# Patient Record
Sex: MALE | Race: WHITE | Employment: OTHER | URBAN - METROPOLITAN AREA
[De-identification: names, ages, dates, MRNs, and addresses within clinical notes are randomized per-mention and may not be internally consistent; named-entity substitution may affect disease eponyms.]

---

## 2021-04-06 ENCOUNTER — APPOINTMENT (OUTPATIENT)
Dept: NUCLEAR MEDICINE | Age: 76
End: 2021-04-06
Payer: MEDICARE

## 2021-04-06 ENCOUNTER — APPOINTMENT (OUTPATIENT)
Dept: GENERAL RADIOLOGY | Age: 76
End: 2021-04-06
Payer: MEDICARE

## 2021-04-06 ENCOUNTER — HOSPITAL ENCOUNTER (OUTPATIENT)
Age: 76
Setting detail: OBSERVATION
Discharge: HOME | End: 2021-04-08
Attending: EMERGENCY MEDICINE | Admitting: INTERNAL MEDICINE
Payer: MEDICARE

## 2021-04-06 DIAGNOSIS — R07.9 CHEST PAIN, UNSPECIFIED TYPE: Primary | ICD-10-CM

## 2021-04-06 LAB
A/G RATIO: 1.4 (ref 1.1–2.2)
ALBUMIN SERPL-MCNC: 4.6 G/DL (ref 3.4–5)
ALP BLD-CCNC: 63 U/L (ref 40–129)
ALT SERPL-CCNC: 43 U/L (ref 10–40)
ANION GAP SERPL CALCULATED.3IONS-SCNC: 11 MMOL/L (ref 3–16)
AST SERPL-CCNC: 35 U/L (ref 15–37)
BASOPHILS ABSOLUTE: 0 K/UL (ref 0–0.2)
BASOPHILS RELATIVE PERCENT: 0.4 %
BILIRUB SERPL-MCNC: 0.6 MG/DL (ref 0–1)
BUN BLDV-MCNC: 23 MG/DL (ref 7–20)
CALCIUM SERPL-MCNC: 9.7 MG/DL (ref 8.3–10.6)
CHLORIDE BLD-SCNC: 100 MMOL/L (ref 99–110)
CO2: 24 MMOL/L (ref 21–32)
CREAT SERPL-MCNC: 1 MG/DL (ref 0.8–1.3)
D DIMER: <200 NG/ML DDU (ref 0–229)
EKG ATRIAL RATE: 77 BPM
EKG DIAGNOSIS: NORMAL
EKG P AXIS: 14 DEGREES
EKG P-R INTERVAL: 158 MS
EKG Q-T INTERVAL: 370 MS
EKG QRS DURATION: 102 MS
EKG QTC CALCULATION (BAZETT): 418 MS
EKG R AXIS: 22 DEGREES
EKG T AXIS: 99 DEGREES
EKG VENTRICULAR RATE: 77 BPM
EOSINOPHILS ABSOLUTE: 0.1 K/UL (ref 0–0.6)
EOSINOPHILS RELATIVE PERCENT: 1.3 %
GFR AFRICAN AMERICAN: >60
GFR NON-AFRICAN AMERICAN: >60
GLOBULIN: 3.4 G/DL
GLUCOSE BLD-MCNC: 161 MG/DL (ref 70–99)
HCT VFR BLD CALC: 45.7 % (ref 40.5–52.5)
HEMOGLOBIN: 15.5 G/DL (ref 13.5–17.5)
LV EF: 74 %
LVEF MODALITY: NORMAL
LYMPHOCYTES ABSOLUTE: 1.9 K/UL (ref 1–5.1)
LYMPHOCYTES RELATIVE PERCENT: 28.4 %
MCH RBC QN AUTO: 32 PG (ref 26–34)
MCHC RBC AUTO-ENTMCNC: 34 G/DL (ref 31–36)
MCV RBC AUTO: 94 FL (ref 80–100)
MONOCYTES ABSOLUTE: 0.5 K/UL (ref 0–1.3)
MONOCYTES RELATIVE PERCENT: 8.1 %
NEUTROPHILS ABSOLUTE: 4.1 K/UL (ref 1.7–7.7)
NEUTROPHILS RELATIVE PERCENT: 61.8 %
PDW BLD-RTO: 12.9 % (ref 12.4–15.4)
PLATELET # BLD: 294 K/UL (ref 135–450)
PMV BLD AUTO: 7.3 FL (ref 5–10.5)
POTASSIUM SERPL-SCNC: 4.3 MMOL/L (ref 3.5–5.1)
PRO-BNP: 191 PG/ML (ref 0–449)
RBC # BLD: 4.86 M/UL (ref 4.2–5.9)
SODIUM BLD-SCNC: 135 MMOL/L (ref 136–145)
TOTAL PROTEIN: 8 G/DL (ref 6.4–8.2)
TROPONIN: <0.01 NG/ML
WBC # BLD: 6.7 K/UL (ref 4–11)

## 2021-04-06 PROCEDURE — 71045 X-RAY EXAM CHEST 1 VIEW: CPT

## 2021-04-06 PROCEDURE — 85379 FIBRIN DEGRADATION QUANT: CPT

## 2021-04-06 PROCEDURE — 85025 COMPLETE CBC W/AUTO DIFF WBC: CPT

## 2021-04-06 PROCEDURE — G0378 HOSPITAL OBSERVATION PER HR: HCPCS

## 2021-04-06 PROCEDURE — A9502 TC99M TETROFOSMIN: HCPCS | Performed by: INTERNAL MEDICINE

## 2021-04-06 PROCEDURE — 80053 COMPREHEN METABOLIC PANEL: CPT

## 2021-04-06 PROCEDURE — 36415 COLL VENOUS BLD VENIPUNCTURE: CPT

## 2021-04-06 PROCEDURE — 78452 HT MUSCLE IMAGE SPECT MULT: CPT

## 2021-04-06 PROCEDURE — 93010 ELECTROCARDIOGRAM REPORT: CPT | Performed by: INTERNAL MEDICINE

## 2021-04-06 PROCEDURE — 2580000003 HC RX 258: Performed by: REGISTERED NURSE

## 2021-04-06 PROCEDURE — 83880 ASSAY OF NATRIURETIC PEPTIDE: CPT

## 2021-04-06 PROCEDURE — 93017 CV STRESS TEST TRACING ONLY: CPT

## 2021-04-06 PROCEDURE — 3430000000 HC RX DIAGNOSTIC RADIOPHARMACEUTICAL: Performed by: INTERNAL MEDICINE

## 2021-04-06 PROCEDURE — 84484 ASSAY OF TROPONIN QUANT: CPT

## 2021-04-06 PROCEDURE — 93005 ELECTROCARDIOGRAM TRACING: CPT | Performed by: EMERGENCY MEDICINE

## 2021-04-06 PROCEDURE — 99284 EMERGENCY DEPT VISIT MOD MDM: CPT

## 2021-04-06 PROCEDURE — 6370000000 HC RX 637 (ALT 250 FOR IP): Performed by: REGISTERED NURSE

## 2021-04-06 PROCEDURE — 99204 OFFICE O/P NEW MOD 45 MIN: CPT | Performed by: INTERNAL MEDICINE

## 2021-04-06 PROCEDURE — 6370000000 HC RX 637 (ALT 250 FOR IP): Performed by: EMERGENCY MEDICINE

## 2021-04-06 PROCEDURE — 6360000002 HC RX W HCPCS: Performed by: INTERNAL MEDICINE

## 2021-04-06 RX ORDER — SODIUM CHLORIDE 9 MG/ML
25 INJECTION, SOLUTION INTRAVENOUS PRN
Status: DISCONTINUED | OUTPATIENT
Start: 2021-04-06 | End: 2021-04-08 | Stop reason: HOSPADM

## 2021-04-06 RX ORDER — ASPIRIN 325 MG
325 TABLET ORAL DAILY
Status: DISCONTINUED | OUTPATIENT
Start: 2021-04-07 | End: 2021-04-08 | Stop reason: HOSPADM

## 2021-04-06 RX ORDER — SODIUM CHLORIDE 0.9 % (FLUSH) 0.9 %
10 SYRINGE (ML) INJECTION PRN
Status: DISCONTINUED | OUTPATIENT
Start: 2021-04-06 | End: 2021-04-08 | Stop reason: HOSPADM

## 2021-04-06 RX ORDER — PROMETHAZINE HYDROCHLORIDE 25 MG/1
12.5 TABLET ORAL EVERY 6 HOURS PRN
Status: DISCONTINUED | OUTPATIENT
Start: 2021-04-06 | End: 2021-04-08 | Stop reason: HOSPADM

## 2021-04-06 RX ORDER — ACETAMINOPHEN 325 MG/1
650 TABLET ORAL EVERY 6 HOURS PRN
Status: DISCONTINUED | OUTPATIENT
Start: 2021-04-06 | End: 2021-04-08 | Stop reason: HOSPADM

## 2021-04-06 RX ORDER — SODIUM CHLORIDE 0.9 % (FLUSH) 0.9 %
10 SYRINGE (ML) INJECTION EVERY 12 HOURS SCHEDULED
Status: DISCONTINUED | OUTPATIENT
Start: 2021-04-06 | End: 2021-04-08 | Stop reason: HOSPADM

## 2021-04-06 RX ORDER — ONDANSETRON 2 MG/ML
4 INJECTION INTRAMUSCULAR; INTRAVENOUS EVERY 6 HOURS PRN
Status: DISCONTINUED | OUTPATIENT
Start: 2021-04-06 | End: 2021-04-08 | Stop reason: HOSPADM

## 2021-04-06 RX ORDER — DOXEPIN HYDROCHLORIDE 25 MG/1
75 CAPSULE ORAL NIGHTLY
Status: DISCONTINUED | OUTPATIENT
Start: 2021-04-06 | End: 2021-04-08 | Stop reason: HOSPADM

## 2021-04-06 RX ORDER — FLUTICASONE PROPIONATE 50 MCG
2 SPRAY, SUSPENSION (ML) NASAL DAILY
Status: DISCONTINUED | OUTPATIENT
Start: 2021-04-06 | End: 2021-04-08 | Stop reason: HOSPADM

## 2021-04-06 RX ORDER — PANTOPRAZOLE SODIUM 40 MG/1
40 TABLET, DELAYED RELEASE ORAL
Status: DISCONTINUED | OUTPATIENT
Start: 2021-04-06 | End: 2021-04-08 | Stop reason: HOSPADM

## 2021-04-06 RX ORDER — NITROGLYCERIN 0.4 MG/1
0.4 TABLET SUBLINGUAL EVERY 5 MIN PRN
Status: DISCONTINUED | OUTPATIENT
Start: 2021-04-06 | End: 2021-04-08 | Stop reason: HOSPADM

## 2021-04-06 RX ORDER — LOSARTAN POTASSIUM 25 MG/1
25 TABLET ORAL DAILY
Status: DISCONTINUED | OUTPATIENT
Start: 2021-04-07 | End: 2021-04-07

## 2021-04-06 RX ORDER — ACETAMINOPHEN 650 MG/1
650 SUPPOSITORY RECTAL EVERY 6 HOURS PRN
Status: DISCONTINUED | OUTPATIENT
Start: 2021-04-06 | End: 2021-04-08 | Stop reason: HOSPADM

## 2021-04-06 RX ORDER — ASPIRIN 81 MG/1
324 TABLET, CHEWABLE ORAL ONCE
Status: COMPLETED | OUTPATIENT
Start: 2021-04-06 | End: 2021-04-06

## 2021-04-06 RX ORDER — METOPROLOL SUCCINATE 25 MG/1
12.5 TABLET, EXTENDED RELEASE ORAL 2 TIMES DAILY
Status: DISCONTINUED | OUTPATIENT
Start: 2021-04-06 | End: 2021-04-07

## 2021-04-06 RX ORDER — POLYETHYLENE GLYCOL 3350 17 G/17G
17 POWDER, FOR SOLUTION ORAL DAILY PRN
Status: DISCONTINUED | OUTPATIENT
Start: 2021-04-06 | End: 2021-04-08 | Stop reason: HOSPADM

## 2021-04-06 RX ORDER — CYCLOBENZAPRINE HCL 10 MG
10 TABLET ORAL 3 TIMES DAILY PRN
Status: ON HOLD | COMMUNITY
Start: 2021-04-02 | End: 2021-04-08 | Stop reason: SDUPTHER

## 2021-04-06 RX ORDER — ROSUVASTATIN CALCIUM 10 MG/1
20 TABLET, COATED ORAL DAILY
Status: DISCONTINUED | OUTPATIENT
Start: 2021-04-06 | End: 2021-04-08 | Stop reason: HOSPADM

## 2021-04-06 RX ORDER — CETIRIZINE HYDROCHLORIDE 10 MG/1
10 TABLET ORAL DAILY
Status: DISCONTINUED | OUTPATIENT
Start: 2021-04-06 | End: 2021-04-08 | Stop reason: HOSPADM

## 2021-04-06 RX ADMIN — ASPIRIN 324 MG: 81 TABLET, CHEWABLE ORAL at 09:14

## 2021-04-06 RX ADMIN — DOXEPIN HYDROCHLORIDE 75 MG: 25 CAPSULE ORAL at 20:55

## 2021-04-06 RX ADMIN — TETROFOSMIN 31.1 MILLICURIE: 1.38 INJECTION, POWDER, LYOPHILIZED, FOR SOLUTION INTRAVENOUS at 13:02

## 2021-04-06 RX ADMIN — NITROGLYCERIN 0.4 MG: 0.4 TABLET, ORALLY DISINTEGRATING SUBLINGUAL at 09:14

## 2021-04-06 RX ADMIN — TETROFOSMIN 10.8 MILLICURIE: 1.38 INJECTION, POWDER, LYOPHILIZED, FOR SOLUTION INTRAVENOUS at 11:50

## 2021-04-06 RX ADMIN — REGADENOSON 0.4 MG: 0.08 INJECTION, SOLUTION INTRAVENOUS at 13:01

## 2021-04-06 RX ADMIN — METOPROLOL SUCCINATE 12.5 MG: 25 TABLET, EXTENDED RELEASE ORAL at 20:55

## 2021-04-06 RX ADMIN — Medication 10 ML: at 21:00

## 2021-04-06 ASSESSMENT — PAIN DESCRIPTION - LOCATION: LOCATION: CHEST

## 2021-04-06 NOTE — CONSULTS
Hypertension, and Panic anxiety syndrome. Surgical History:   has a past surgical history that includes Hernia repair; Cholecystectomy; Appendectomy; Colonoscopy (12/16/10); and Colonoscopy (2/24/2015). Social History:   reports that he quit smoking about 35 years ago. He has never used smokeless tobacco. He reports that he does not drink alcohol. Family History:  No evidence for sudden cardiac death or premature CAD    Medications:  Reviewed and are listed in nursing record. and/or listed below  Outpatient Medications:  Prior to Admission medications    Medication Sig Start Date End Date Taking? Authorizing Provider   cyclobenzaprine (FLEXERIL) 10 MG tablet Take 10 mg by mouth 3 times daily as needed 4/2/21  Yes Historical Provider, MD   rosuvastatin (CRESTOR) 10 MG tablet Take 20 mg by mouth daily. Yes Historical Provider, MD   aspirin 325 MG tablet Take 325 mg by mouth daily. Yes Historical Provider, MD   losartan (COZAAR) 25 MG tablet Take 25 mg by mouth daily. Yes Historical Provider, MD   metoprolol (TOPROL-XL) 25 MG XL tablet Take 12.5 mg by mouth 2 times daily. 12/16/10  Yes Historical Provider, MD   doxepin (SINEQUAN) 100 MG capsule Take 75 mg by mouth nightly. Yes Historical Provider, MD   loratadine (CLARITIN) 10 MG tablet Take 10 mg by mouth daily. Yes Historical Provider, MD   fluticasone (FLONASE) 50 MCG/ACT nasal spray 2 sprays by Nasal route daily.    Yes Historical Provider, MD       In-patient schedule medications:   [START ON 4/7/2021] aspirin  325 mg Oral Daily    doxepin  75 mg Oral Nightly    fluticasone  2 spray Nasal Daily    cetirizine  10 mg Oral Daily    [START ON 4/7/2021] losartan  25 mg Oral Daily    metoprolol succinate  12.5 mg Oral BID    rosuvastatin  20 mg Oral Daily    sodium chloride flush  10 mL Intravenous 2 times per day    [START ON 4/7/2021] enoxaparin  40 mg Subcutaneous Daily         Infusion Medications:   sodium chloride Allergies:  Atorvastatin and Lisinopril     Review of Systems:   All 14 point review of symptoms completed. Pertinent positives identified in the HPI, all other review of symptoms findings as below.      Review of Systems - History obtained from the patient  General ROS: negative for - chills, fever or night sweats  Psychological ROS: negative for - disorientation or hallucinations  Ophthalmic ROS: negative for - dry eyes, eye pain or loss of vision  ENT ROS: negative for - nasal discharge or sore throat  Allergy and Immunology ROS: negative for - hives or itchy/watery eyes  Hematological and Lymphatic ROS: negative for - jaundice or night sweats  Endocrine ROS: negative for - mood swings or temperature intolerance  Breast ROS: deferred  Respiratory ROS: negative for - hemoptysis or stridor  Gastrointestinal ROS: no abdominal pain, change in bowel habits, or black or bloody stools  Genito-Urinary ROS: no dysuria, trouble voiding, or hematuria  Musculoskeletal ROS: negative for - gait disturbance, joint pain or joint stiffness  Neurological ROS: negative for - seizures or speech problems  Dermatological ROS: negative for - rash or skin lesion changes      Physical Examination:    [unfilled]  /70   Pulse 67   Temp 97.6 °F (36.4 °C) (Oral)   Resp 18   Ht 5' 6\" (1.676 m)   Wt 220 lb (99.8 kg)   SpO2 95%   BMI 35.51 kg/m²    Weight: 220 lb (99.8 kg)     Wt Readings from Last 3 Encounters:   04/06/21 220 lb (99.8 kg)   02/24/15 220 lb (99.8 kg)   11/12/13 220 lb (99.8 kg)     No intake or output data in the 24 hours ending 04/06/21 1139    General Appearance:  Alert, cooperative, no distress, appears stated age   Head:  Normocephalic, without obvious abnormality, atraumatic   Eyes:  PERRL, conjunctiva/corneas clear       Nose: Nares normal, no drainage or sinus tenderness   Throat: Lips, mucosa, and tongue normal   Neck: Supple, symmetrical, trachea midline, no adenopathy, thyroid: not enlarged, symmetric, no tenderness/mass/nodules, no carotid bruit or JVD       Lungs:   Clear to auscultation bilaterally, respirations unlabored   Chest Wall:  No tenderness or deformity   Heart:  Regular rhythm and normal rate; S1, S2 are normal; no murmur noted; no rub or gallop   Abdomen:   Soft, non-tender, bowel sounds active all four quadrants,  no masses, no organomegaly           Extremities: Extremities normal, atraumatic, no cyanosis or edema   Pulses: 2+ and symmetric   Skin: Skin color, texture, turgor normal, no rashes or lesions   Pysch: Normal mood and affect   Neurologic: Normal gross motor and sensory exam.         Labs  Recent Labs     04/06/21  0807   WBC 6.7   HGB 15.5   HCT 45.7   MCV 94.0        Recent Labs     04/06/21  0807   CREATININE 1.0   BUN 23*   *   K 4.3      CO2 24     No results for input(s): INR, PROTIME in the last 72 hours. Recent Labs     04/06/21  0807   TROPONINI <0.01     Invalid input(s): PRO-BNP  No results for input(s): CHOL, HDL in the last 72 hours. Invalid input(s): LDL, TG      Imaging:  I have reviewed the below testing personally and my interpretation is below. EKG:  Normal sinus rhythmInferior infarct , age undeterminedAbnormal ECGNo previous ECGs available  CXR:      Assessment:  66 y.o. patient with:  Problem List Items Addressed This Visit     Chest pain - Primary      Active Problems:    Chest pain    CAD (coronary artery disease)    Abnormal EKG    Hypertension  Resolved Problems:    * No resolved hospital problems. *      Plan:  1. The patient's symptoms and associated risk factors suggest an intermediate probability of ischemic heart disease as the cause for the symptoms. I recommend the patient undergo non-invasive evaluation with stress testing to further evaluate the symptoms. 2. Serial troponin levels will be ordered and reviewed  3.  The patient has been given instructions on addressing diet, regular exercise, weight control, smoking abstention, medication compliance, and stress minimization. 4. The patient should be treated with these therapies unless contraindicated:   ~asa daily   ~beta-blockers   ~statin therapy   ~ACE/ARB   ~repeat troponin until down trending   ~EKG as clinically needed  5. After review of these tests, further recommendations regarding care will be given. All questions and concerns were addressed to the patient/family. Alternatives to my treatment were discussed. The note was completed using EMR. Every effort was made to ensure accuracy; however, inadvertent computerized transcription errors may be present.     Juan Alvarado MD, [address], [place], [place]  [phone] [facility]  [phone] Main central  4/6/2021  11:39 AM

## 2021-04-06 NOTE — H&P
Pupils equal, round, and reactive to light. Extra ocular muscles intact. Conjunctivae/corneas clear. Neck: Supple, with full range of motion. No jugular venous distention. Trachea midline. Respiratory:  Normal respiratory effort. Clear to auscultation, bilaterally without Rales/Wheezes/Rhonchi. Cardiovascular:  Regular rate and rhythm with normal S1/S2 without murmurs, rubs or gallops. Abdomen: Soft, non-tender, non-distended with normal bowel sounds. Musculoskeletal:  No clubbing, cyanosis or edema bilaterally. Full range of motion without deformity. Skin: Skin color, texture, turgor normal.  No rashes or lesions. Neurologic:  Neurovascularly intact without any focal sensory/motor deficits. Cranial nerves: II-XII intact, grossly non-focal.  Psychiatric:  Alert and oriented, thought content appropriate, normal insight  Capillary Refill: Brisk,< 3 seconds   Peripheral Pulses: +2 palpable, equal bilaterally       Labs:     Recent Labs     04/06/21  0807   WBC 6.7   HGB 15.5   HCT 45.7        Recent Labs     04/06/21  0807   *   K 4.3      CO2 24   BUN 23*   CREATININE 1.0   CALCIUM 9.7     Recent Labs     04/06/21  0807   AST 35   ALT 43*   BILITOT 0.6   ALKPHOS 63     Recent Labs     04/06/21  0807   TROPONINI <0.01     Radiology:     XR CHEST PORTABLE   Final Result   Mild cardiomegaly. Probable basilar atelectasis. Low lung volumes. NM Cardiac Stress Test Nuclear Imaging    (Results Pending)       ASSESSMENT:    Active Hospital Problems    Diagnosis Date Noted    Chest pain [R07.9] 04/06/2021    Hypertension [I10]     CAD (coronary artery disease) [I25.10] 2008    Abnormal EKG [R94.31] 2008       Chest pain in pt with history of CAD status post stents:  - Etiology unclear. Suspect MSK vs GI related. Pain developed after he moved himself in bed a few weeks ago, pain is not reproducible though. Pt believes that symptoms might have started then.  Pain typically occurs the same time every night after he lies down. He reports intermittent episodes of melena as well. - Given history of CAD and stents Cardiology was consulted for recommendations. Stress test was obtained and was normal. EKG non-ischemic in the ED. Serial troponin trend was negative x3. ACS ruled out. D-dimer was negative. - Consult GI for further assistance. Start empiric PPI. - Continue aspirin, statin, metoprolol and losartan. - Monitor pt on telemetry. Hypertension:  - Variable control. Continue his home medications. Monitor. Hyperlipidemia:  - Continue statin. PTSD/anxiety:  - Mood stable. Continue his home medications. Obesity:  - With Body mass index is 35.51 kg/m². Complicating assessment and treatment. Placing patient at risk for multiple co-morbidities as well as early death and contributing to the patient's presentation. Counseled on weight loss. DVT Prophylaxis: Lovenox   Diet: Diet NPO Effective Now  Diet NPO, After Midnight  Code Status: Full Code    PT/OT Eval Status: Not indicated     Dispo - < 2 days        [address], APRN - CNP    Thank you Sheeba Hamm MD for the opportunity to be involved in this patient's care. If you have any questions or concerns please feel free to contact me at [phone].

## 2021-04-06 NOTE — PROGRESS NOTES
A Rosemary Myoview stress test was completed on this patient as ordered. The patient tolerated the procedure well. Awaiting stress imaging at this time.

## 2021-04-06 NOTE — ED PROVIDER NOTES
METOPROLOL (TOPROL-XL) 25 MG XL TABLET    Take 12.5 mg by mouth 2 times daily. ROSUVASTATIN (CRESTOR) 10 MG TABLET    Take 20 mg by mouth daily. Social history:  reports that he quit smoking about 35 years ago. He does not have any smokeless tobacco history on file. He reports that he does not drink alcohol. Family history:    Family History   Problem Relation Age of Onset    Heart Disease Father          Exam  ED Triage Vitals   BP Temp Temp src Pulse Resp SpO2 Height Weight   -- -- -- -- -- -- -- --     Nursing note and vitals reviewed. Constitutional: In no acute distress  HENT:      Head: Normocephalic      Ears: External ears normal.      Nose: Nose normal.     Mouth: Membrane mucosa moist   Eyes: No discharge. Neck: Supple. Trachea midline. Cardiovascular: Regular rate. Warm extremities  Pulmonary/Chest: Effort normal. No respiratory distress. Abdominal: Soft. No distension. Nontender  : Deferred  Rectal: Deferred   Musculoskeletal: Moves all extremities. No gross deformity. Neurological: Alert and oriented. Face symmetric. Speech is clear. Skin: Warm and dry. Psychiatric: Normal mood and affect. Behavior is normal.    Procedures      EKG  The Ekg interpreted by me in the absence of a cardiologist shows. Normal sinus rhythm. No specific ST changes appreciated.   HR 77  No prior EKG for comparison  Q waves in lead III, aVF  Inverted T wave lead I, aVL    Radiology  No orders to display       Labs  Results for orders placed or performed during the hospital encounter of 04/06/21   EKG 12 Lead   Result Value Ref Range    Ventricular Rate 77 BPM    Atrial Rate 77 BPM    P-R Interval 158 ms    QRS Duration 102 ms    Q-T Interval 370 ms    QTc Calculation (Bazett) 418 ms    P Axis 14 degrees    R Axis 22 degrees    T Axis 99 degrees    Diagnosis       Normal sinus rhythmInferior infarct , age undeterminedAbnormal ECGNo previous ECGs available       Screenings           MDM and ED Course  This is a 66 y.o. male who presents to the ED for patient is a 47-year-old man with history of coronary artery disease status post stenting in 2008 who presents with chest pain for 3 weeks that is intermittent and worsening. May be due to ACS. EKG shows inverted T waves in lateral leads with no old for comparison. Will give aspirin and nitroglycerin. Pulmonary embolism on differential.  Will obtain D-dimer to help risk stratify for this as well as for dissection. Not having infectious symptoms    Pain improved since waiting here without treatment. Lab unremarkable. Will admit to hospitalist service for chest pain workup    [unfilled]    Final Impression  1. Chest pain, unspecified type        Blood pressure (!) 148/82, pulse 76, temperature 98.7 °F (37.1 °C), temperature source Oral, resp. rate 17, height 5' 6\" (1.676 m), weight 220 lb (99.8 kg), SpO2 97 %. Disposition:  DISPOSITION Decision To Admit 04/06/2021 08:10:50 AM      Patient Referrals:  No follow-up provider specified. Discharge Medications:  New Prescriptions    No medications on file       Discontinued Medications:  Discontinued Medications    No medications on file       This chart was generated using the [address] [place] [address] dictation system. I created this record but it may contain dictation errors given the limitations of this technology.         Theresa Lowery MD  04/06/21 2131

## 2021-04-07 ENCOUNTER — APPOINTMENT (OUTPATIENT)
Dept: CT IMAGING | Age: 76
End: 2021-04-07
Payer: MEDICARE

## 2021-04-07 LAB
A/G RATIO: 1.5 (ref 1.1–2.2)
ALBUMIN SERPL-MCNC: 4.5 G/DL (ref 3.4–5)
ALP BLD-CCNC: 61 U/L (ref 40–129)
ALT SERPL-CCNC: 44 U/L (ref 10–40)
ANION GAP SERPL CALCULATED.3IONS-SCNC: 10 MMOL/L (ref 3–16)
AST SERPL-CCNC: 38 U/L (ref 15–37)
BILIRUB SERPL-MCNC: 0.6 MG/DL (ref 0–1)
BUN BLDV-MCNC: 24 MG/DL (ref 7–20)
CALCIUM SERPL-MCNC: 9.5 MG/DL (ref 8.3–10.6)
CHLORIDE BLD-SCNC: 101 MMOL/L (ref 99–110)
CHOLESTEROL, TOTAL: 174 MG/DL (ref 0–199)
CO2: 27 MMOL/L (ref 21–32)
CREAT SERPL-MCNC: 1 MG/DL (ref 0.8–1.3)
EKG ATRIAL RATE: 70 BPM
EKG DIAGNOSIS: NORMAL
EKG P AXIS: 46 DEGREES
EKG P-R INTERVAL: 168 MS
EKG Q-T INTERVAL: 410 MS
EKG QRS DURATION: 106 MS
EKG QTC CALCULATION (BAZETT): 442 MS
EKG R AXIS: 58 DEGREES
EKG T AXIS: 116 DEGREES
EKG VENTRICULAR RATE: 70 BPM
GFR AFRICAN AMERICAN: >60
GFR NON-AFRICAN AMERICAN: >60
GLOBULIN: 3.1 G/DL
GLUCOSE BLD-MCNC: 136 MG/DL (ref 70–99)
HCT VFR BLD CALC: 46.2 % (ref 40.5–52.5)
HDLC SERPL-MCNC: 38 MG/DL (ref 40–60)
HEMOGLOBIN: 15.5 G/DL (ref 13.5–17.5)
LDL CHOLESTEROL CALCULATED: 108 MG/DL
MCH RBC QN AUTO: 31.8 PG (ref 26–34)
MCHC RBC AUTO-ENTMCNC: 33.5 G/DL (ref 31–36)
MCV RBC AUTO: 95.1 FL (ref 80–100)
PDW BLD-RTO: 12.9 % (ref 12.4–15.4)
PLATELET # BLD: 283 K/UL (ref 135–450)
PMV BLD AUTO: 7.1 FL (ref 5–10.5)
POTASSIUM REFLEX MAGNESIUM: 4.5 MMOL/L (ref 3.5–5.1)
RBC # BLD: 4.86 M/UL (ref 4.2–5.9)
SARS-COV-2, NAAT: NOT DETECTED
SODIUM BLD-SCNC: 138 MMOL/L (ref 136–145)
TOTAL PROTEIN: 7.6 G/DL (ref 6.4–8.2)
TRIGL SERPL-MCNC: 140 MG/DL (ref 0–150)
VLDLC SERPL CALC-MCNC: 28 MG/DL
WBC # BLD: 6.7 K/UL (ref 4–11)

## 2021-04-07 PROCEDURE — 6370000000 HC RX 637 (ALT 250 FOR IP): Performed by: INTERNAL MEDICINE

## 2021-04-07 PROCEDURE — 85027 COMPLETE CBC AUTOMATED: CPT

## 2021-04-07 PROCEDURE — 36415 COLL VENOUS BLD VENIPUNCTURE: CPT

## 2021-04-07 PROCEDURE — 6370000000 HC RX 637 (ALT 250 FOR IP): Performed by: REGISTERED NURSE

## 2021-04-07 PROCEDURE — 93010 ELECTROCARDIOGRAM REPORT: CPT | Performed by: INTERNAL MEDICINE

## 2021-04-07 PROCEDURE — 93005 ELECTROCARDIOGRAM TRACING: CPT | Performed by: REGISTERED NURSE

## 2021-04-07 PROCEDURE — 71250 CT THORAX DX C-: CPT

## 2021-04-07 PROCEDURE — G0378 HOSPITAL OBSERVATION PER HR: HCPCS

## 2021-04-07 PROCEDURE — 80053 COMPREHEN METABOLIC PANEL: CPT

## 2021-04-07 PROCEDURE — 99226 PR SBSQ OBSERVATION CARE/DAY 35 MINUTES: CPT | Performed by: INTERNAL MEDICINE

## 2021-04-07 PROCEDURE — 87635 SARS-COV-2 COVID-19 AMP PRB: CPT

## 2021-04-07 PROCEDURE — 2580000003 HC RX 258: Performed by: REGISTERED NURSE

## 2021-04-07 PROCEDURE — 80061 LIPID PANEL: CPT

## 2021-04-07 RX ORDER — METOPROLOL SUCCINATE 25 MG/1
25 TABLET, EXTENDED RELEASE ORAL 2 TIMES DAILY
Status: DISCONTINUED | OUTPATIENT
Start: 2021-04-07 | End: 2021-04-08 | Stop reason: HOSPADM

## 2021-04-07 RX ORDER — LOSARTAN POTASSIUM 25 MG/1
50 TABLET ORAL DAILY
Status: DISCONTINUED | OUTPATIENT
Start: 2021-04-08 | End: 2021-04-08 | Stop reason: HOSPADM

## 2021-04-07 RX ADMIN — DOXEPIN HYDROCHLORIDE 75 MG: 25 CAPSULE ORAL at 22:00

## 2021-04-07 RX ADMIN — METOPROLOL SUCCINATE 25 MG: 25 TABLET, EXTENDED RELEASE ORAL at 22:00

## 2021-04-07 RX ADMIN — CETIRIZINE HYDROCHLORIDE 10 MG: 10 TABLET, FILM COATED ORAL at 08:48

## 2021-04-07 RX ADMIN — Medication 10 ML: at 08:46

## 2021-04-07 RX ADMIN — METOPROLOL SUCCINATE 12.5 MG: 25 TABLET, EXTENDED RELEASE ORAL at 08:49

## 2021-04-07 RX ADMIN — Medication 10 ML: at 22:00

## 2021-04-07 RX ADMIN — PANTOPRAZOLE SODIUM 40 MG: 40 TABLET, DELAYED RELEASE ORAL at 06:38

## 2021-04-07 RX ADMIN — ROSUVASTATIN CALCIUM 20 MG: 10 TABLET, FILM COATED ORAL at 08:47

## 2021-04-07 RX ADMIN — LOSARTAN POTASSIUM 25 MG: 25 TABLET, FILM COATED ORAL at 08:48

## 2021-04-07 ASSESSMENT — PAIN DESCRIPTION - PAIN TYPE: TYPE: ACUTE PAIN

## 2021-04-07 ASSESSMENT — PAIN DESCRIPTION - LOCATION: LOCATION: CHEST

## 2021-04-07 NOTE — PLAN OF CARE
Problem: Falls - Risk of:  Goal: Will remain free from falls  Description: Will remain free from falls  4/7/2021 0240 by Damien Wilkinson RN  Outcome: Ongoing     Problem: Pain:  Goal: Pain level will decrease  Description: Pain level will decrease  Outcome: Ongoing

## 2021-04-07 NOTE — PROGRESS NOTES
[address]   Progress Note  Cardiology    CC: chest pain     HPI: no cp/sob    Past Medical History   has a past medical history of Arthritis, CAD (coronary artery disease), Hyperlipidemia, Hypertension, and Panic anxiety syndrome. Past Surgical History   has a past surgical history that includes Hernia repair; Cholecystectomy; Appendectomy; Colonoscopy (12/16/10); and Colonoscopy (2/24/2015). Social History   reports that he quit smoking about 35 years ago. He has never used smokeless tobacco. He reports that he does not drink alcohol. Family History  family history includes Heart Disease in his father. Medications  Prior to Admission medications    Medication Sig Start Date End Date Taking? Authorizing Provider   cyclobenzaprine (FLEXERIL) 10 MG tablet Take 10 mg by mouth 3 times daily as needed 4/2/21  Yes Historical Provider, MD   rosuvastatin (CRESTOR) 10 MG tablet Take 20 mg by mouth daily. Yes Historical Provider, MD   aspirin 325 MG tablet Take 325 mg by mouth daily. Yes Historical Provider, MD   losartan (COZAAR) 25 MG tablet Take 25 mg by mouth daily. Yes Historical Provider, MD   metoprolol (TOPROL-XL) 25 MG XL tablet Take 12.5 mg by mouth 2 times daily. 12/16/10  Yes Historical Provider, MD   doxepin (SINEQUAN) 100 MG capsule Take 75 mg by mouth nightly. Yes Historical Provider, MD   loratadine (CLARITIN) 10 MG tablet Take 10 mg by mouth daily. Yes Historical Provider, MD   fluticasone (FLONASE) 50 MCG/ACT nasal spray 2 sprays by Nasal route daily. Yes Historical Provider, MD       Allergies  Atorvastatin and Lisinopril    Review of Systems:   Reviewed.  No changes except as noted in HPI and A/P      Lab Results   Component Value Date    WBC 6.7 04/07/2021    HGB 15.5 04/07/2021    HCT 46.2 04/07/2021    MCV 95.1 04/07/2021     04/07/2021     Lab Results   Component Value Date    CREATININE 1.0 04/07/2021    BUN 24 (H) 04/07/2021     04/07/2021    K 4.5 04/07/2021     04/07/2021    CO2 27 04/07/2021     Lab Results   Component Value Date    INR 0.96 08/27/2012    PROTIME 11.0 08/27/2012       I reviewed EKGs and radiology imaging. Pertinent findings and changes as described in assessment below. Physical Examination:    /80   Pulse 73   Temp 97.7 °F (36.5 °C) (Oral)   Resp 16   Ht 5' 6\" (1.676 m)   Wt 217 lb 1.6 oz (98.5 kg)   SpO2 93%   BMI 35.04 kg/m²      General Appearance:  Alert, no distress, appears stated age   Head:  Normocephalic, without obvious abnormality, atraumatic   Eyes:  PERRL, conjunctiva/corneas clear         Nose: Nares normal, no drainage or sinus tenderness   Throat: Lips, mucosa, and tongue normal   Neck: Supple, symmetrical, trachea midline, no adenopathy         Lungs:   Clear to auscultation bilaterally    Chest Wall:  No tenderness or deformity   Heart:  Regular rate and rhythm, S1, S2 normal, no murmur, rub or gallop   Abdomen:   Soft, non tender, normal bowel sounds                Extremities: No cyanosis or edema   Pulses: 2+ and symmetric   Skin: Skin color, texture, turgor normal, no rashes    Pysch: Normal mood and affect   Neurologic: Normal gross motor and sensory exam.        Assessment:    Chest pain - appears non cardiac  CAD (coronary artery disease) - stable.  Stress test reviewed   Abnormal EKG  Hypertension - suboptimal  HLD - stable    Plan  Increase losartan  Increase toprol   Cont ASA, statin             Mera Stevens MD, 4/7/2021 12:21 PM

## 2021-04-07 NOTE — CONSULTS
Gastroenterology Consult Note    Patient:   Sam Livingston   YOB: 1945   Facility:   [facility]   Referring/PCP: Gamaliel Shelby MD  Date:     4/7/2021  Consultant:   Jose Henderson MD    Subjective: This 66 y.o. male was admitted 4/6/2021 with a diagnosis of \"Chest pain [R07.9]\" and is seen in consultation regarding \"CP\". Information was obtained from interview of  the patient, examination of the patient, and review of records. I did  update the past medical, surgical, social and / or family history. CP moderate for 3 weeks anterior assoc w breathing    Current status  Present  Diet Order: DIET CARDIAC; and he is tolerating diet. Recently, he has experienced moderate, maximum 5/10 cheat Pain and he has not required Intravenous narcotic analgesics. The patient has also experienced no constipation, diarrhea, fever, hematochezia, melena, nausea and vomiting      Prior to Admission medications    Medication Sig Start Date End Date Taking? Authorizing Provider   cyclobenzaprine (FLEXERIL) 10 MG tablet Take 10 mg by mouth 3 times daily as needed 4/2/21  Yes Historical Provider, MD   rosuvastatin (CRESTOR) 10 MG tablet Take 20 mg by mouth daily. Yes Historical Provider, MD   aspirin 325 MG tablet Take 325 mg by mouth daily. Yes Historical Provider, MD   losartan (COZAAR) 25 MG tablet Take 25 mg by mouth daily. Yes Historical Provider, MD   metoprolol (TOPROL-XL) 25 MG XL tablet Take 12.5 mg by mouth 2 times daily. 12/16/10  Yes Historical Provider, MD   doxepin (SINEQUAN) 100 MG capsule Take 75 mg by mouth nightly. Yes Historical Provider, MD   loratadine (CLARITIN) 10 MG tablet Take 10 mg by mouth daily. Yes Historical Provider, MD   fluticasone (FLONASE) 50 MCG/ACT nasal spray 2 sprays by Nasal route daily.    Yes Historical Provider, MD      Scheduled Medications:    aspirin  325 mg Oral Daily    doxepin  75 mg Oral Nightly    fluticasone  2 spray Nasal Daily    cetirizine  10 mg Oral Daily    losartan  25 mg Oral Daily    metoprolol succinate  12.5 mg Oral BID    rosuvastatin  20 mg Oral Daily    sodium chloride flush  10 mL Intravenous 2 times per day    enoxaparin  40 mg Subcutaneous Daily    pantoprazole  40 mg Oral QAM AC     Infusions:    sodium chloride       PRN Medications: nitroGLYCERIN, sodium chloride flush, sodium chloride, promethazine **OR** ondansetron, acetaminophen **OR** acetaminophen, polyethylene glycol  Allergies: Allergies   Allergen Reactions    Atorvastatin      nausea    Lisinopril Other (See Comments)     cough       Past Medical History:   Diagnosis Date    Arthritis     joints    CAD (coronary artery disease) 2008    stents x2    Hyperlipidemia     Hypertension     Panic anxiety syndrome      Past Surgical History:   Procedure Laterality Date    APPENDECTOMY      CHOLECYSTECTOMY      COLONOSCOPY  12/16/10    COLONOSCOPY  2015    Poylps    HERNIA REPAIR      bilateral inguinal       Social:   Social History     Tobacco Use    Smoking status: Former Smoker     Quit date: 1985     Years since quittin.3    Smokeless tobacco: Never Used   Substance Use Topics    Alcohol use: No     Family:   Family History   Problem Relation Age of Onset    Heart Disease Father        ROS: Pertinent items are noted in HPI.     Objective:   Vital Signs:  Temp (24hrs), Av.9 °F (36.6 °C), Min:97.6 °F (36.4 °C), Max:98.7 °F (98.9 °C)     Systolic (15TFN), BBN:013 , Min:116 , QBR:362      Diastolic (34ITA), JUI:40, Min:70, Max:84     Pulse  Av.3  Min: 62  Max: 77  BP (!) 145/84   Pulse 71   Temp 97.8 °F (36.6 °C) (Oral)   Resp 17   Ht 5' 6\" (1.676 m)   Wt 217 lb 1.6 oz (98.5 kg)   SpO2 93%   BMI 35.04 kg/m²      Physical Exam:   BP (!) 145/84   Pulse 71   Temp 97.8 °F (36.6 °C) (Oral)   Resp 17   Ht 5' 6\" (1.676 m)   Wt 217 lb 1.6 oz (98.5 kg)   SpO2 93%   BMI 35.04 kg/m²   General appearance: alert, appears stated age and cooperative  Lungs: clear to auscultation bilaterally  Chest wall: no tenderness  Heart: regular rate and rhythm, S1, S2 normal, no murmur, click, rub or gallop  Abdomen: soft, non-tender; bowel sounds normal; no masses,  no organomegaly  Extremities: extremities normal, atraumatic, no cyanosis or edema  Skin: Skin color, texture, turgor normal. No rashes or lesions  Neurologic: Grossly normal    Lab and Imaging Review   Recent Labs     04/06/21  0807   WBC 6.7   HGB 15.5   MCV 94.0      *   K 4.3      CO2 24   BUN 23*   CREATININE 1.0   GLUCOSE 161*   CALCIUM 9.7   PROT 8.0   LABALBU 4.6   AST 35   ALT 43*   ALKPHOS 63   BILITOT 0.6       Assessment:     Patient Active Problem List    Diagnosis Date Noted    Chest pain 04/06/2021    Hypertension     CAD (coronary artery disease) 2008    Abnormal EKG 2008     75 y.o. male with PMH of GERD, CAD s/p stents, HLD, HTN and anxiety admitted w Lt anterior CP since he strained to get out of bed 3 weeks ago. The pain is worse w breathing and when lays down, but not w activity or eating. Had neg. CXR and Stress test. This sounds musculoskeletal or pleuritic but other etiology needs to be ruled out. Plan:   1. Agree w Protonix  2. Consider chest CT-A  3. Will schedule EGD in am if above neg.   4. Will follow    Klever Peace MD       [phone]) [phone]

## 2021-04-07 NOTE — PROGRESS NOTES
chloride flush  10 mL Intravenous 2 times per day    enoxaparin  40 mg Subcutaneous Daily    pantoprazole  40 mg Oral QAM AC     PRN Meds: nitroGLYCERIN, sodium chloride flush, sodium chloride, promethazine **OR** ondansetron, acetaminophen **OR** acetaminophen, polyethylene glycol      Intake/Output Summary (Last 24 hours) at 4/7/2021 1314  Last data filed at 4/7/2021 0007  Gross per 24 hour   Intake 240 ml   Output 0 ml   Net 240 ml       Physical Exam Performed:    /80   Pulse 73   Temp 97.7 °F (36.5 °C) (Oral)   Resp 16   Ht 5' 6\" (1.676 m)   Wt 217 lb 1.6 oz (98.5 kg)   SpO2 93%   BMI 35.04 kg/m²     General appearance: No apparent distress, appears stated age and cooperative. HEENT: Pupils equal, round, and reactive to light. Conjunctivae/corneas clear. Neck: Supple, with full range of motion. No jugular venous distention. Trachea midline. Respiratory:  Normal respiratory effort. Clear to auscultation, bilaterally without Rales/Wheezes/Rhonchi. Cardiovascular: Regular rate and rhythm with normal S1/S2 without murmurs, rubs or gallops. Abdomen: Soft, non-tender, non-distended with normal bowel sounds. Musculoskeletal: No clubbing, cyanosis or edema bilaterally. Full range of motion without deformity. Skin: Skin color, texture, turgor normal.  No rashes or lesions. Neurologic:  Neurovascularly intact without any focal sensory/motor deficits.  Cranial nerves: II-XII intact, grossly non-focal.  Psychiatric: Alert and oriented, thought content appropriate, normal insight  Capillary Refill: Brisk,< 3 seconds   Peripheral Pulses: +2 palpable, equal bilaterally       Labs:   Recent Labs     04/06/21  0807 04/07/21  0811   WBC 6.7 6.7   HGB 15.5 15.5   HCT 45.7 46.2    283     Recent Labs     04/06/21  0807 04/07/21  0811   * 138   K 4.3 4.5    101   CO2 24 27   BUN 23* 24*   CREATININE 1.0 1.0   CALCIUM 9.7 9.5     Recent Labs     04/06/21  0807 04/07/21  0811   AST 35 38* ALT 43* 44*   BILITOT 0.6 0.6   ALKPHOS 63 61     No results for input(s): INR in the last 72 hours. Recent Labs     04/06/21  0807 04/06/21  1510 04/06/21  1914   TROPONINI <0.01 <0.01 <0.01       Urinalysis:    No results found for: Mikaela Solum, BACTERIA, RBCUA, BLOODU, SPECGRAV, GLUCOSEU    Radiology:  CT CHEST WO CONTRAST   Final Result   No acute abnormality. NM Cardiac Stress Test Nuclear Imaging   Final Result      XR CHEST PORTABLE   Final Result   Mild cardiomegaly. Probable basilar atelectasis. Low lung volumes. Assessment/Plan:    Active Hospital Problems    Diagnosis    Chest pain [R07.9]    Hypertension [I10]    CAD (coronary artery disease) [I25.10]    Abnormal EKG [R94.31]       Chest pain in pt with history of CAD status post stents:  - Etiology unclear. Suspect MSK vs GI related. Pain developed after he moved himself in bed a few weeks ago, pain is not reproducible though. Pt believes that symptoms might have started then. Pain typically occurs the same time every night after he lies down. He reports intermittent episodes of melena as well. - Given history of CAD and stents Cardiology was consulted for recommendations. Stress test was obtained and was normal. EKG non-ischemic in the ED. Serial troponin trend was negative x3. ACS ruled out.   - CT chest showed no acute abnormality but did show non-specific thickening a the GE junction. D-dimer was negative. - Started empiric PPI. GI consulted. Plan for EGD in am.   - Continue aspirin, statin, metoprolol and losartan. - Monitor pt on telemetry.     Hypertension:  - Variable control. Continue his home medications, losartan and metoprolol increased for better BP control. Monitor.      Hyperlipidemia:  - Continue statin.      PTSD/anxiety:  - Mood stable. Continue his home medications.      Obesity:  - With Body mass index is 35.51 kg/m². Complicating assessment and treatment.  Placing patient at risk for multiple co-morbidities as well as early death and contributing to the patient's presentation. Counseled on weight loss. DVT Prophylaxis: Lovenox   Diet: DIET CARDIAC; Diet NPO, After Midnight  Code Status: Full Code    PT/OT Eval Status: not indicated, pt is ambulatory     Dispo - Possibly home tomorrow afternoon after EGD.      [facility], APRN - CNP

## 2021-04-08 ENCOUNTER — ANESTHESIA EVENT (OUTPATIENT)
Dept: ENDOSCOPY | Age: 76
End: 2021-04-08
Payer: MEDICARE

## 2021-04-08 ENCOUNTER — ANESTHESIA (OUTPATIENT)
Dept: ENDOSCOPY | Age: 76
End: 2021-04-08
Payer: MEDICARE

## 2021-04-08 VITALS
BODY MASS INDEX: 34.79 KG/M2 | OXYGEN SATURATION: 94 % | WEIGHT: 216.5 LBS | SYSTOLIC BLOOD PRESSURE: 134 MMHG | HEIGHT: 66 IN | DIASTOLIC BLOOD PRESSURE: 79 MMHG | RESPIRATION RATE: 18 BRPM | TEMPERATURE: 98.3 F | HEART RATE: 63 BPM

## 2021-04-08 VITALS
DIASTOLIC BLOOD PRESSURE: 73 MMHG | RESPIRATION RATE: 26 BRPM | OXYGEN SATURATION: 86 % | SYSTOLIC BLOOD PRESSURE: 121 MMHG

## 2021-04-08 PROCEDURE — 6370000000 HC RX 637 (ALT 250 FOR IP): Performed by: REGISTERED NURSE

## 2021-04-08 PROCEDURE — 3700000000 HC ANESTHESIA ATTENDED CARE: Performed by: INTERNAL MEDICINE

## 2021-04-08 PROCEDURE — 3609012400 HC EGD TRANSORAL BIOPSY SINGLE/MULTIPLE: Performed by: INTERNAL MEDICINE

## 2021-04-08 PROCEDURE — 88305 TISSUE EXAM BY PATHOLOGIST: CPT

## 2021-04-08 PROCEDURE — 2709999900 HC NON-CHARGEABLE SUPPLY: Performed by: INTERNAL MEDICINE

## 2021-04-08 PROCEDURE — G0378 HOSPITAL OBSERVATION PER HR: HCPCS

## 2021-04-08 PROCEDURE — 2580000003 HC RX 258: Performed by: INTERNAL MEDICINE

## 2021-04-08 PROCEDURE — 6370000000 HC RX 637 (ALT 250 FOR IP): Performed by: INTERNAL MEDICINE

## 2021-04-08 PROCEDURE — 2580000003 HC RX 258: Performed by: REGISTERED NURSE

## 2021-04-08 PROCEDURE — 2500000003 HC RX 250 WO HCPCS: Performed by: NURSE ANESTHETIST, CERTIFIED REGISTERED

## 2021-04-08 PROCEDURE — 6360000002 HC RX W HCPCS: Performed by: NURSE ANESTHETIST, CERTIFIED REGISTERED

## 2021-04-08 PROCEDURE — 99226 PR SBSQ OBSERVATION CARE/DAY 35 MINUTES: CPT | Performed by: INTERNAL MEDICINE

## 2021-04-08 PROCEDURE — 7100000010 HC PHASE II RECOVERY - FIRST 15 MIN: Performed by: INTERNAL MEDICINE

## 2021-04-08 PROCEDURE — 7100000011 HC PHASE II RECOVERY - ADDTL 15 MIN: Performed by: INTERNAL MEDICINE

## 2021-04-08 RX ORDER — LOSARTAN POTASSIUM 50 MG/1
50 TABLET ORAL DAILY
Qty: 30 TABLET | Refills: 3 | Status: ON HOLD | OUTPATIENT
Start: 2021-04-09 | End: 2022-08-10 | Stop reason: SINTOL

## 2021-04-08 RX ORDER — PROMETHAZINE HYDROCHLORIDE 25 MG/ML
6.25 INJECTION, SOLUTION INTRAMUSCULAR; INTRAVENOUS
Status: DISCONTINUED | OUTPATIENT
Start: 2021-04-08 | End: 2021-04-08 | Stop reason: HOSPADM

## 2021-04-08 RX ORDER — MORPHINE SULFATE 2 MG/ML
1 INJECTION, SOLUTION INTRAMUSCULAR; INTRAVENOUS EVERY 5 MIN PRN
Status: DISCONTINUED | OUTPATIENT
Start: 2021-04-08 | End: 2021-04-08 | Stop reason: HOSPADM

## 2021-04-08 RX ORDER — PROPOFOL 10 MG/ML
INJECTION, EMULSION INTRAVENOUS PRN
Status: DISCONTINUED | OUTPATIENT
Start: 2021-04-08 | End: 2021-04-08 | Stop reason: SDUPTHER

## 2021-04-08 RX ORDER — LIDOCAINE HYDROCHLORIDE 20 MG/ML
INJECTION, SOLUTION INFILTRATION; PERINEURAL PRN
Status: DISCONTINUED | OUTPATIENT
Start: 2021-04-08 | End: 2021-04-08 | Stop reason: SDUPTHER

## 2021-04-08 RX ORDER — OXYCODONE HYDROCHLORIDE AND ACETAMINOPHEN 5; 325 MG/1; MG/1
1 TABLET ORAL PRN
Status: DISCONTINUED | OUTPATIENT
Start: 2021-04-08 | End: 2021-04-08 | Stop reason: HOSPADM

## 2021-04-08 RX ORDER — MORPHINE SULFATE 2 MG/ML
2 INJECTION, SOLUTION INTRAMUSCULAR; INTRAVENOUS EVERY 5 MIN PRN
Status: DISCONTINUED | OUTPATIENT
Start: 2021-04-08 | End: 2021-04-08 | Stop reason: HOSPADM

## 2021-04-08 RX ORDER — CYCLOBENZAPRINE HCL 10 MG
5 TABLET ORAL 3 TIMES DAILY PRN
Status: DISCONTINUED | OUTPATIENT
Start: 2021-04-08 | End: 2021-04-08 | Stop reason: HOSPADM

## 2021-04-08 RX ORDER — SODIUM CHLORIDE, SODIUM LACTATE, POTASSIUM CHLORIDE, CALCIUM CHLORIDE 600; 310; 30; 20 MG/100ML; MG/100ML; MG/100ML; MG/100ML
INJECTION, SOLUTION INTRAVENOUS CONTINUOUS
Status: DISCONTINUED | OUTPATIENT
Start: 2021-04-08 | End: 2021-04-08 | Stop reason: HOSPADM

## 2021-04-08 RX ORDER — CYCLOBENZAPRINE HCL 10 MG
10 TABLET ORAL 2 TIMES DAILY PRN
Qty: 20 TABLET | Refills: 0 | Status: SHIPPED | OUTPATIENT
Start: 2021-04-08 | End: 2021-04-18

## 2021-04-08 RX ORDER — METOPROLOL SUCCINATE 25 MG/1
25 TABLET, EXTENDED RELEASE ORAL 2 TIMES DAILY
Qty: 30 TABLET | Refills: 3 | Status: SHIPPED | OUTPATIENT
Start: 2021-04-08

## 2021-04-08 RX ORDER — ONDANSETRON 2 MG/ML
4 INJECTION INTRAMUSCULAR; INTRAVENOUS PRN
Status: DISCONTINUED | OUTPATIENT
Start: 2021-04-08 | End: 2021-04-08 | Stop reason: HOSPADM

## 2021-04-08 RX ORDER — DIPHENHYDRAMINE HYDROCHLORIDE 50 MG/ML
12.5 INJECTION INTRAMUSCULAR; INTRAVENOUS
Status: DISCONTINUED | OUTPATIENT
Start: 2021-04-08 | End: 2021-04-08 | Stop reason: HOSPADM

## 2021-04-08 RX ORDER — HYDRALAZINE HYDROCHLORIDE 20 MG/ML
5 INJECTION INTRAMUSCULAR; INTRAVENOUS EVERY 10 MIN PRN
Status: DISCONTINUED | OUTPATIENT
Start: 2021-04-08 | End: 2021-04-08 | Stop reason: HOSPADM

## 2021-04-08 RX ORDER — MEPERIDINE HYDROCHLORIDE 50 MG/ML
12.5 INJECTION INTRAMUSCULAR; INTRAVENOUS; SUBCUTANEOUS EVERY 5 MIN PRN
Status: DISCONTINUED | OUTPATIENT
Start: 2021-04-08 | End: 2021-04-08 | Stop reason: HOSPADM

## 2021-04-08 RX ORDER — OXYCODONE HYDROCHLORIDE AND ACETAMINOPHEN 5; 325 MG/1; MG/1
2 TABLET ORAL PRN
Status: DISCONTINUED | OUTPATIENT
Start: 2021-04-08 | End: 2021-04-08 | Stop reason: HOSPADM

## 2021-04-08 RX ORDER — PANTOPRAZOLE SODIUM 40 MG/1
40 TABLET, DELAYED RELEASE ORAL
Qty: 30 TABLET | Refills: 3 | Status: SHIPPED | OUTPATIENT
Start: 2021-04-09 | End: 2022-08-23

## 2021-04-08 RX ORDER — LABETALOL HYDROCHLORIDE 5 MG/ML
5 INJECTION, SOLUTION INTRAVENOUS EVERY 10 MIN PRN
Status: DISCONTINUED | OUTPATIENT
Start: 2021-04-08 | End: 2021-04-08 | Stop reason: HOSPADM

## 2021-04-08 RX ADMIN — ROSUVASTATIN CALCIUM 20 MG: 10 TABLET, FILM COATED ORAL at 08:13

## 2021-04-08 RX ADMIN — CETIRIZINE HYDROCHLORIDE 10 MG: 10 TABLET, FILM COATED ORAL at 08:13

## 2021-04-08 RX ADMIN — PANTOPRAZOLE SODIUM 40 MG: 40 TABLET, DELAYED RELEASE ORAL at 05:13

## 2021-04-08 RX ADMIN — Medication 10 ML: at 08:13

## 2021-04-08 RX ADMIN — SODIUM CHLORIDE, SODIUM LACTATE, POTASSIUM CHLORIDE, AND CALCIUM CHLORIDE: .6; .31; .03; .02 INJECTION, SOLUTION INTRAVENOUS at 09:30

## 2021-04-08 RX ADMIN — LOSARTAN POTASSIUM 50 MG: 25 TABLET, FILM COATED ORAL at 08:13

## 2021-04-08 RX ADMIN — LIDOCAINE HYDROCHLORIDE 60 MG: 20 INJECTION, SOLUTION INFILTRATION; PERINEURAL at 09:37

## 2021-04-08 RX ADMIN — PROPOFOL 100 MG: 10 INJECTION, EMULSION INTRAVENOUS at 09:37

## 2021-04-08 RX ADMIN — METOPROLOL SUCCINATE 25 MG: 25 TABLET, EXTENDED RELEASE ORAL at 08:13

## 2021-04-08 ASSESSMENT — PAIN SCALES - GENERAL
PAINLEVEL_OUTOF10: 0
PAINLEVEL_OUTOF10: 0

## 2021-04-08 NOTE — PROGRESS NOTES
[address] [facility]   Cardiovascular Evaluation    PATIENT: Du Castanon  DATE: 2021  MRN: [id number]  CSN: [id number]  : 1945    Primary Care Doctor/Referring provider: Sheeba Hamm MD, Gigi Osborn MD     Reason for evaluation/Chief complaint:   Chest Pain (been going on for two week, woke him up at 0330, sharp midsternal pain, )      Subjective: The patient stress test and cardiac testing to date has been normal.  He underwent CT of the chest which was unremarkable for any acute pathology. Gastrointestinal team looking for EGD possibly. History of present illness on initial date of evaluation:   Du Castanon is a 66 y.o. patient who presents with onset of chest pain for the past several days. He states that the pain is a \"hurt\" in the middle of his chest pain. The patient states that his symptoms occur mainly at night. He states that when he lays down he notices the symptoms. The symptoms are worse when he takes a deep breath and when he is in certain positions. States that the pain does not worsen when he exerts himself and does physical activity. The pain does not radiate into any other part of the body such to the jaw or the arm. Pain is a very sharp stabbing-like pain. Patient has a history of ischemic heart disease and underwent coronary revascularization in the remote past. At that time patient describes having a burning-like sensation that was exertional. He is able to differentiate these symptom complexes as being different from his previous symptomatology. Patient normally follows with [place] cardiology and was last seen several months ago and in stable condition. Cardiology's been asked to see patient for further recommendations. His troponin levels have been unremarkable.          Patient Active Problem List   Diagnosis    Chest pain    CAD (coronary artery disease)    Abnormal EKG    Hypertension         Cardiac Testing: I have reviewed the findings below. EKG:  ECHO:   STRESS TEST:  CATH:  BYPASS:  VASCULAR:    Past Medical History:   has a past medical history of Arthritis, CAD (coronary artery disease), Hyperlipidemia, Hypertension, and Panic anxiety syndrome. Surgical History:   has a past surgical history that includes Hernia repair; Cholecystectomy; Appendectomy; Colonoscopy (12/16/10); and Colonoscopy (2/24/2015). Social History:   reports that he quit smoking about 35 years ago. He has never used smokeless tobacco. He reports that he does not drink alcohol. Family History:  No evidence for sudden cardiac death or premature CAD    Medications:  Reviewed and are listed in nursing record. and/or listed below  Outpatient Medications:  Prior to Admission medications    Medication Sig Start Date End Date Taking? Authorizing Provider   cyclobenzaprine (FLEXERIL) 10 MG tablet Take 10 mg by mouth 3 times daily as needed 4/2/21  Yes Historical Provider, MD   rosuvastatin (CRESTOR) 10 MG tablet Take 20 mg by mouth daily. Yes Historical Provider, MD   aspirin 325 MG tablet Take 325 mg by mouth daily. Yes Historical Provider, MD   losartan (COZAAR) 25 MG tablet Take 25 mg by mouth daily. Yes Historical Provider, MD   metoprolol (TOPROL-XL) 25 MG XL tablet Take 12.5 mg by mouth 2 times daily. 12/16/10  Yes Historical Provider, MD   doxepin (SINEQUAN) 100 MG capsule Take 75 mg by mouth nightly. Yes Historical Provider, MD   loratadine (CLARITIN) 10 MG tablet Take 10 mg by mouth daily. Yes Historical Provider, MD   fluticasone (FLONASE) 50 MCG/ACT nasal spray 2 sprays by Nasal route daily.    Yes Historical Provider, MD       In-patient schedule medications:   losartan  50 mg Oral Daily    metoprolol succinate  25 mg Oral BID    aspirin  325 mg Oral Daily    doxepin  75 mg Oral Nightly    fluticasone  2 spray Nasal Daily    cetirizine  10 mg Oral Daily    rosuvastatin  20 mg Oral Daily    sodium chloride flush  10 mL Intravenous 2 times per day    enoxaparin  40 mg Subcutaneous Daily    pantoprazole  40 mg Oral QAM AC         Infusion Medications:   sodium chloride           Allergies:  Atorvastatin and Lisinopril     Review of Systems:   All 14 point review of symptoms completed. Pertinent positives identified in the HPI, all other review of symptoms findings as below.      Review of Systems - History obtained from the patient  General ROS: negative for - chills, fever or night sweats  Psychological ROS: negative for - disorientation or hallucinations  Ophthalmic ROS: negative for - dry eyes, eye pain or loss of vision  ENT ROS: negative for - nasal discharge or sore throat  Allergy and Immunology ROS: negative for - hives or itchy/watery eyes  Hematological and Lymphatic ROS: negative for - jaundice or night sweats  Endocrine ROS: negative for - mood swings or temperature intolerance  Breast ROS: deferred  Respiratory ROS: negative for - hemoptysis or stridor  Gastrointestinal ROS: no abdominal pain, change in bowel habits, or black or bloody stools  Genito-Urinary ROS: no dysuria, trouble voiding, or hematuria  Musculoskeletal ROS: negative for - gait disturbance, joint pain or joint stiffness  Neurological ROS: negative for - seizures or speech problems  Dermatological ROS: negative for - rash or skin lesion changes      Physical Examination:    [unfilled]  /75   Pulse 63   Temp 97.8 °F (36.6 °C) (Oral)   Resp 18   Ht 5' 6\" (1.676 m)   Wt 216 lb 8 oz (98.2 kg)   SpO2 92%   BMI 34.94 kg/m²    Weight: 216 lb 8 oz (98.2 kg)     Wt Readings from Last 3 Encounters:   04/08/21 216 lb 8 oz (98.2 kg)   02/24/15 220 lb (99.8 kg)   11/12/13 220 lb (99.8 kg)       Intake/Output Summary (Last 24 hours) at 4/8/2021 0816  Last data filed at 4/8/2021 0354  Gross per 24 hour   Intake 240 ml   Output --   Net 240 ml       General Appearance:  Alert, cooperative, no distress, appears stated age   Head:  Normocephalic, without obvious abnormality, atraumatic   Eyes:  PERRL, conjunctiva/corneas clear       Nose: Nares normal, no drainage or sinus tenderness   Throat: Lips, mucosa, and tongue normal   Neck: Supple, symmetrical, trachea midline, no adenopathy, thyroid: not enlarged, symmetric, no tenderness/mass/nodules, no carotid bruit or JVD       Lungs:   Clear to auscultation bilaterally, respirations unlabored   Chest Wall:  No tenderness or deformity   Heart:  Regular rhythm and normal rate; S1, S2 are normal; no murmur noted; no rub or gallop   Abdomen:   Soft, non-tender, bowel sounds active all four quadrants,  no masses, no organomegaly           Extremities: Extremities normal, atraumatic, no cyanosis or edema   Pulses: 2+ and symmetric   Skin: Skin color, texture, turgor normal, no rashes or lesions   Pysch: Normal mood and affect   Neurologic: Normal gross motor and sensory exam.         Labs  Recent Labs     04/06/21  0807 04/07/21  0811   WBC 6.7 6.7   HGB 15.5 15.5   HCT 45.7 46.2   MCV 94.0 95.1    283     Recent Labs     04/06/21  0807 04/07/21  0811   CREATININE 1.0 1.0   BUN 23* 24*   * 138   K 4.3 4.5    101   CO2 24 27     No results for input(s): INR, PROTIME in the last 72 hours. Recent Labs     04/06/21  0807 04/06/21  1510 04/06/21  1914   TROPONINI <0.01 <0.01 <0.01     Invalid input(s): PRO-BNP  Recent Labs     04/07/21  0811   CHOL 174   HDL 38*         Imaging:  I have reviewed the below testing personally and my interpretation is below. EKG:  Normal sinus rhythmInferior infarct , age undeterminedAbnormal ECGNo previous ECGs available  CXR:      Assessment:  66 y.o. patient with:  Problem List Items Addressed This Visit     Chest pain - Primary      Active Problems:    Chest pain    CAD (coronary artery disease)    Abnormal EKG    Hypertension  Resolved Problems:    * No resolved hospital problems. *      Plan:  1. The patient's chest pain symptoms at this point are noncardiac  2. Continue guideline directed medical therapy for his previously diagnosed coronary disease  3. Patient stable for EGD  4. Cardiology will sign off.  5.  Patient can follow-up with his primary cardiologist in the outpatient setting is previously planned. All questions and concerns were addressed to the patient/family. Alternatives to my treatment were discussed. The note was completed using EMR. Every effort was made to ensure accuracy; however, inadvertent computerized transcription errors may be present.     Gloria Vazquez MD, Dionne Summers [id number], [facility] [place], [place], [address] [facility]  [phone] [facility]  4/8/2021  8:16 AM

## 2021-04-08 NOTE — OP NOTE
Esophagogastroduodenoscopy Note    Patient:   Clement Dailey    YOB: 1945    Facility:   [facility] [Inpatient]   Referring/PCP: Alice Owen MD    Procedure:   Esophagogastroduodenoscopy --diagnostic  Date:     4/8/2021   Endoscopist:  Hellen Williamson     Preoperative Diagnosis:  GERD/non-cardiac CP  Postoperative Diagnosis:  MILD GASTRITIS    Anesthesia: Propofol  Estimated blood loss: Estimated amount of blood loss is <1ml. Complications: None    Description of Procedure:  Informed consent was obtained from the patient after explanation of the procedure including indications, description of the procedure,  benefits and possible risks and complications of the procedure, and alternatives. Questions were answered. The patient's history was reviewed and a directed physical examination was performed prior to the procedure. Patient was monitored throughout the procedure with pulse oximetry and periodic assessment of vital signs. Patient was sedated as noted above. The Nursing staff and I performed a time out. With the patient in the left lateral decubitus position, the Olympus videoendoscope was placed in the patient's mouth and under direct visualization passed into the esophagus. The scope was ultimately passed to the second portion of the duodenum. Visualization was performed during both introduction and withdrawal of the endoscope and retroflexed view of the proximal stomach was obtained. Findings[de-identified]   Esophagus: normal, biopsied. The findings do not support a diagnosis of Sorensen's Esophagus. Stomach: Mild gastritis, biopsied  Duodenum: normal    Recommendations: -Acid suppression with a proton pump inhibitor. , -Await pathology. , -Consider Rx for musculoskeletal CP    Hellen Williamson MD       [phone]) [phone]          Hellen Williamson MD

## 2021-04-08 NOTE — H&P
(PHENERGAN) tablet 12.5 mg  12.5 mg Oral Q6H PRN NATE Velasco - CNP        Or    ondansetron [facility]) injection 4 mg  4 mg Intravenous Q6H PRN Prema Salazar, APRN - CNP        acetaminophen (TYLENOL) tablet 650 mg  650 mg Oral Q6H PRN Prema Salazar, NATE - CNP        Or    acetaminophen (TYLENOL) suppository 650 mg  650 mg Rectal Q6H PRN Prema Salazar, APRN - CNP        polyethylene glycol (GLYCOLAX) packet 17 g  17 g Oral Daily PRN Prema Salazar, APRN - CNP        enoxaparin (LOVENOX) injection 40 mg  40 mg Subcutaneous Daily [address] [place], NATE - [place]        pantoprazole (PROTONIX) tablet 40 mg  40 mg Oral QAM AC Prema Salazar, APRN - CNP   40 mg at 04/08/21 0179     Past Medical History:   Diagnosis Date    Arthritis     joints    CAD (coronary artery disease) 2008    stents x2    Hyperlipidemia     Hypertension     Panic anxiety syndrome      Past Surgical History:   Procedure Laterality Date    APPENDECTOMY      CHOLECYSTECTOMY      COLONOSCOPY  12/16/10    COLONOSCOPY  2/24/2015    Poylps    HERNIA REPAIR      bilateral inguinal       Nurses notes reviewed and agreed. Medications reviewed  Allergies: Allergies   Allergen Reactions    Atorvastatin      nausea    Lisinopril Other (See Comments)     cough           Physical Exam:  Vital Signs: /80   Pulse 65   Temp 97.9 °F (36.6 °C) (Oral)   Resp 18   Ht 5' 6\" (1.676 m)   Wt 216 lb 8 oz (98.2 kg)   SpO2 95%   BMI 34.94 kg/m²  Body mass index is 34.94 kg/m².   Airway:Normal  Cardiac:Normal  Pulmonary:Normal  Abdomen:Normal  Specific to procedure: none      Pre-Procedure Assessment/Plan:  ASA 3 - Patient with moderate systemic disease with functional limitations  Mallampati I  Level of Sedation Plan:Deep sedation    Post Procedure plan: Return to same level of care    I assessed the patient and find that the patient is in satisfactory condition to proceed with the planned procedure and sedation plan. I have explained the risk, benefits, and alternatives to the procedure. The patient understands and agrees to proceed.   Yes    Dave Guerrero MD       (O) [phone]          Dave Guerrero  8:04 AM 4/8/2021

## 2021-04-08 NOTE — PROGRESS NOTES
Pt d/c'd home. Removed R and L hand IV and stopped bleeding. Catheter intact. Pt tolerated well. No redness noted at site. Notified [facility] and removed tele box. Reviewed d/c instructions, home meds, and  f/u information utilizing teach-back method. Scripts for meds filled in outpatient pharmacy and given to patient. Patient verbalized understanding.

## 2021-04-08 NOTE — PROGRESS NOTES
Patient is awake, alert and oriented x4. Assessment is complete, see flow sheet. Bed in lowest position, wheels locked, call light is within reach. Patient denies any further needs at the moment. Will continue to monitor.   Vitals:    04/08/21 0815   BP: 137/75   Pulse: 63   Resp: 18   Temp: 97.8 °F (36.6 °C)   SpO2: 92%     Denies pain, on room air  NPO for EGD  Consent is signed

## 2021-04-08 NOTE — PROGRESS NOTES
COVID  Today  Negative    Preoperative Screening for Elective Surgery/Invasive Procedures While COVID-19 present in the community     Have you tested positive or have been told to self-isolate for COVID-19 like symptoms within the past 28 days? NO   Do you currently have any of the following symptoms? No  o Fever >100.0 F or 99.9 F in immunocompromised patients? No  o New onset cough, shortness of breath or difficulty breathing? No  o New onset sore throat, myalgia (muscle aches and pains), headache, loss of taste/smell or diarrhea? No   Have you had a potential exposure to COVID-19 within the past 14 days by:  o Close contact with a confirmed case? No  o Close contact with a healthcare worker,  or essential infrastructure worker (grocery store, [employer], gas station, public utilities or transportation)? No  o Do you reside in a congregate setting such as; skilled nursing facility, adult home, correctional facility, homeless shelter or other institutional setting? No  o Have you had recent travel to a known COVID-19 hotspot? No    Indicate if the patient has a positive screen by answering yes to one or more of the above questions. Patients who test positive or screen positive prior to surgery or on the day of surgery should be evaluated in conjunction with the surgeon/proceduralist/anesthesiologist to determine the urgency of the procedure. Pre and post operative expectations and routines explained to patient, verbalized understanding.

## 2021-04-08 NOTE — ANESTHESIA POSTPROCEDURE EVALUATION
Department of Anesthesiology  Postprocedure Note    Patient: Po Saba  MRN: [id number]  YOB: 1945  Date of evaluation: 4/8/2021  Time:  11:09 AM     Procedure Summary     Date: 04/08/21 Room / Location: [facility]    Anesthesia Start: 6778 Anesthesia Stop: 2327    Procedure: EGD BIOPSY (N/A ) Diagnosis: (-)    Surgeons: Bernard Segovia MD Responsible Provider: Vera Renteria MD    Anesthesia Type: general ASA Status: 3          Anesthesia Type: general    Mackenzie Phase I: Mackenzie Score: 10    Mackenzie Phase II: Mackenzie Score: 9    Last vitals: Reviewed and per EMR flowsheets.        Anesthesia Post Evaluation    Comments: Postoperative Anesthesia Note    Name:    Po Saba  MRN:      [id number]    Patient Vitals in the past 12 hrs:  04/08/21 1107, BP:134/79, Temp:98.3 °F (36.8 °C), Temp src:Oral, Pulse:63, Resp:18, SpO2:94 %  04/08/21 1033, BP:126/76, Pulse:64, Resp:16, SpO2:96 %  04/08/21 1018, BP:127/79, Pulse:66, Resp:16, SpO2:95 %  04/08/21 1003, BP:116/72, Pulse:63, Resp:16, SpO2:94 %  04/08/21 0947, BP:115/76, Pulse:64, Resp:20, SpO2:93 %  04/08/21 0815, BP:137/75, Temp:97.8 °F (36.6 °C), Temp src:Oral, Pulse:63, Resp:18, SpO2:92 %  04/08/21 0500, Weight:216 lb 8 oz (98.2 kg)  04/08/21 0354, BP:129/80, Temp:97.9 °F (36.6 °C), Temp src:Oral, Pulse:65, Resp:18, SpO2:95 %  04/08/21 0000, BP:(!) 143/73, Temp:97.9 °F (36.6 °C), Temp src:Oral, Pulse:66, Resp:18, SpO2:95 %     LABS:    CBC  Lab Results       Component                Value               Date/Time                  WBC                      6.7                 04/07/2021 08:11 AM        HGB                      15.5                04/07/2021 08:11 AM        HCT                      46.2                04/07/2021 08:11 AM        PLT                      283                 04/07/2021 08:11 AM   RENAL  Lab Results       Component                Value               Date/Time                  NA 138                 04/07/2021 08:11 AM        K                        4.5                 04/07/2021 08:11 AM        CL                       101                 04/07/2021 08:11 AM        CO2                      27                  04/07/2021 08:11 AM        BUN                      24 (H)              04/07/2021 08:11 AM        CREATININE               1.0                 04/07/2021 08:11 AM        GLUCOSE                  136 (H)             04/07/2021 08:11 AM   COAGS  Lab Results       Component                Value               Date/Time                  PROTIME                  11.0                08/27/2012 01:45 PM        INR                      0.96                08/27/2012 01:45 PM     Intake & Output:  @74OOWD@    Nausea & Vomiting:  No    Level of Consciousness:  Awake    Pain Assessment:  Adequate analgesia    Anesthesia Complications:  No apparent anesthetic complications    SUMMARY      Vital signs stable  OK to discharge from Stage I post anesthesia care.   Care transferred from Anesthesiology department on discharge from perioperative area

## 2021-04-08 NOTE — PLAN OF CARE
Problem: Falls - Risk of:  Goal: Will remain free from falls  Description: Will remain free from falls  4/8/2021 1031 by Vanda Solorzano RN  Outcome: Ongoing  Note: Pt is low fall risk. Pt is a selfer. Call light within reach. Bed in low position. Will continue to monitor. Problem: Pain:  Goal: Pain level will decrease  Description: Pain level will decrease  4/8/2021 1031 by Vanda Solorzano RN  Outcome: Ongoing  Note: Patient's pain level controlled at this time. Will continue to monitor.

## 2021-04-08 NOTE — DISCHARGE SUMMARY
free.     Chest pain in pt with history of CAD status post stents:  - Suspect MSK related. Pain developed after he moved himself in bed a few weeks ago. Pt believes that symptoms might have started then. Continue prn analgesia. - Given history of CAD and stents Cardiology was consulted for recommendations. Stress test was obtained and was normal. EKG non-ischemic in the ED. Serial troponin trend was negative x3. ACS ruled out.   - CT chest showed no acute abnormality but did show non-specific thickening at the GE junction. D-dimer was negative. - GI consulted. S/p EGD which demonstrated mild gastritis. Continue PPI. - Continue aspirin, statin, metoprolol and losartan.      Hypertension:  - Variable control. Continue his home medications, losartan and metoprolol increased for better BP control.      Hyperlipidemia:  - Continue statin.      PTSD/anxiety:  - Mood stable. Continue his home medications.      Obesity:  - With Body mass index is 62.88 kg/m². Complicating assessment and treatment. Placing patient at risk for multiple co-morbidities as well as early death and contributing to the patient's presentation. Counseled on weight loss. Physical Exam Performed:     /79   Pulse 63   Temp 98.3 °F (36.8 °C) (Oral)   Resp 18   Ht 5' 6\" (1.676 m)   Wt 216 lb 8 oz (98.2 kg)   SpO2 94%   BMI 34.94 kg/m²       General appearance:  No apparent distress, appears stated age and cooperative. HEENT:  Normal cephalic, atraumatic without obvious deformity. Pupils equal, round, and reactive to light. Extra ocular muscles intact. Conjunctivae/corneas clear. Neck: Supple, with full range of motion. No jugular venous distention. Trachea midline. Respiratory:  Normal respiratory effort. Clear to auscultation, bilaterally without Rales/Wheezes/Rhonchi. Cardiovascular:  Regular rate and rhythm with normal S1/S2 without murmurs, rubs or gallops.   Abdomen: Soft, non-tender, non-distended with normal bowel sounds. Musculoskeletal:  No clubbing, cyanosis or edema bilaterally. Full range of motion without deformity. Skin: Skin color, texture, turgor normal.  No rashes or lesions. Neurologic:  Neurovascularly intact without any focal sensory/motor deficits. Cranial nerves: II-XII intact, grossly non-focal.  Psychiatric:  Alert and oriented, thought content appropriate, normal insight  Capillary Refill: Brisk,< 3 seconds   Peripheral Pulses: +2 palpable, equal bilaterally       Labs: For convenience and continuity at follow-up the following most recent labs are provided:      CBC:    Lab Results   Component Value Date    WBC 6.7 04/07/2021    HGB 15.5 04/07/2021    HCT 46.2 04/07/2021     04/07/2021       Renal:    Lab Results   Component Value Date     04/07/2021    K 4.5 04/07/2021     04/07/2021    CO2 27 04/07/2021    BUN 24 04/07/2021    CREATININE 1.0 04/07/2021    CALCIUM 9.5 04/07/2021         Significant Diagnostic Studies    Radiology:   CT CHEST WO CONTRAST   Final Result   No acute abnormality. NM Cardiac Stress Test Nuclear Imaging   Final Result      XR CHEST PORTABLE   Final Result   Mild cardiomegaly. Probable basilar atelectasis. Low lung volumes.                 Consults:     IP CONSULT TO CARDIOLOGY  IP CONSULT TO GI    Disposition:  Home     Condition at Discharge: Stable    Discharge Instructions/Follow-up:  Follow-up with PCP     Code Status:  Full code     Activity: activity as tolerated    Diet: cardiac diet      Discharge Medications:     Discharge Medication List as of 4/8/2021 11:29 AM           Details   pantoprazole (PROTONIX) 40 MG tablet Take 1 tablet by mouth every morning (before breakfast), Disp-30 tablet, R-3Normal              Details   cyclobenzaprine (FLEXERIL) 10 MG tablet Take 1 tablet by mouth 2 times daily as needed for Muscle spasms, Disp-20 tablet, R-0Normal      losartan (COZAAR) 50 MG tablet Take 1 tablet by mouth daily, Disp-30 tablet, R-3Normal      metoprolol succinate (TOPROL XL) 25 MG extended release tablet Take 1 tablet by mouth 2 times daily, Disp-30 tablet, R-3Normal              Details   rosuvastatin (CRESTOR) 10 MG tablet Take 20 mg by mouth daily. aspirin 325 MG tablet Take 325 mg by mouth daily. doxepin (SINEQUAN) 100 MG capsule Take 75 mg by mouth nightly. loratadine (CLARITIN) 10 MG tablet Take 10 mg by mouth daily. fluticasone (FLONASE) 50 MCG/ACT nasal spray 2 sprays by Nasal route daily. Time Spent on discharge is more than 45 minutes in the examination, evaluation, counseling and review of medications and discharge plan. Signed:    NATE Valero - CNP   4/8/2021      Thank you Alice Owen MD for the opportunity to be involved in this patient's care. If you have any questions or concerns please feel free to contact me at [phone].

## 2021-04-08 NOTE — ANESTHESIA PRE PROCEDURE
Department of Anesthesiology  Preprocedure Note       Name:  Ramo    Age:  66 y.o.  :  1945                                          MRN:  [id number]         Date:  2021      Surgeon: Baldo Ann):  Kirsty Pa MD    Procedure: Procedure(s):  EGD DIAGNOSTIC ONLY    Medications prior to admission:   Prior to Admission medications    Medication Sig Start Date End Date Taking? Authorizing Provider   cyclobenzaprine (FLEXERIL) 10 MG tablet Take 10 mg by mouth 3 times daily as needed 21  Yes Historical Provider, MD   rosuvastatin (CRESTOR) 10 MG tablet Take 20 mg by mouth daily. Yes Historical Provider, MD   aspirin 325 MG tablet Take 325 mg by mouth daily. Yes Historical Provider, MD   losartan (COZAAR) 25 MG tablet Take 25 mg by mouth daily. Yes Historical Provider, MD   metoprolol (TOPROL-XL) 25 MG XL tablet Take 12.5 mg by mouth 2 times daily. 12/16/10  Yes Historical Provider, MD   doxepin (SINEQUAN) 100 MG capsule Take 75 mg by mouth nightly. Yes Historical Provider, MD   loratadine (CLARITIN) 10 MG tablet Take 10 mg by mouth daily. Yes Historical Provider, MD   fluticasone (FLONASE) 50 MCG/ACT nasal spray 2 sprays by Nasal route daily.    Yes Historical Provider, MD       Current medications:    Current Facility-Administered Medications   Medication Dose Route Frequency Provider Last Rate Last Admin    losartan (COZAAR) tablet 50 mg  50 mg Oral Daily Ruby Grant MD   50 mg at 21 0813    metoprolol succinate (TOPROL XL) extended release tablet 25 mg  25 mg Oral BID Ruby Grant MD   25 mg at 21 0813    nitroGLYCERIN (NITROSTAT) SL tablet 0.4 mg  0.4 mg Sublingual Q5 Min PRN Prema Huggins, APRN - CNP   0.4 mg at 21 1571    aspirin tablet 325 mg  325 mg Oral Daily [facility], APRN - CNP   Stopped at 21 0900    doxepin (SINEQUAN) capsule 75 mg  75 mg Oral Nightly [facility], APRN - CNP   75 mg at 21 2200  fluticasone (FLONASE) 50 MCG/ACT nasal spray 2 spray  2 spray Nasal Daily Prema Say Appl, APRN - CNP        cetirizine (ZYRTEC) tablet 10 mg  10 mg Oral Daily [address] Mark, APRN - CNP   10 mg at 04/08/21 0813    rosuvastatin (CRESTOR) tablet 20 mg  20 mg Oral Daily [address] Mark, APRN - CNP   20 mg at 04/08/21 0813    sodium chloride flush 0.9 % injection 10 mL  10 mL Intravenous 2 times per day Tommas Bunk, APRN - CNP   10 mL at 04/08/21 0813    sodium chloride flush 0.9 % injection 10 mL  10 mL Intravenous PRN Tommas Bunk, APRN - CNP        0.9 % sodium chloride infusion  25 mL Intravenous PRN Tommas Bunk, APRN - CNP        promethazine (PHENERGAN) tablet 12.5 mg  12.5 mg Oral Q6H PRN Prema Deal Appl, APRN - CNP        Or    ondansetron [facility]) injection 4 mg  4 mg Intravenous Q6H PRN Prema Say Appl, APRN - CNP        acetaminophen (TYLENOL) tablet 650 mg  650 mg Oral Q6H PRN Prema Mcraeel Appl, APRN - CNP        Or    acetaminophen (TYLENOL) suppository 650 mg  650 mg Rectal Q6H PRN Prema Say Appl, APRN - CNP        polyethylene glycol (GLYCOLAX) packet 17 g  17 g Oral Daily PRN Prema Mcraeel Appl, APRN - CNP        enoxaparin (LOVENOX) injection 40 mg  40 mg Subcutaneous Daily [address] Mark, APRN - CNP        pantoprazole (PROTONIX) tablet 40 mg  40 mg Oral QAM AC Prema Say Appl, APRN - CNP   40 mg at 04/08/21 1135       Allergies:     Allergies   Allergen Reactions    Atorvastatin      nausea    Lisinopril Other (See Comments)     cough       Problem List:    Patient Active Problem List   Diagnosis Code    Chest pain R07.9    CAD (coronary artery disease) I25.10    Abnormal EKG R94.31    Hypertension I10       Past Medical History:        Diagnosis Date    Arthritis     joints    CAD (coronary artery disease) 2008    stents x2    Hyperlipidemia     Hypertension     Panic anxiety syndrome        Past Surgical History:        Procedure Laterality Date    APPENDECTOMY      CHOLECYSTECTOMY      COLONOSCOPY  12/16/10    COLONOSCOPY  2015    Poylps    HERNIA REPAIR      bilateral inguinal       Social History:    Social History     Tobacco Use    Smoking status: Former Smoker     Quit date: 1985     Years since quittin.3    Smokeless tobacco: Never Used   Substance Use Topics    Alcohol use: No                                Counseling given: Not Answered      Vital Signs (Current):   Vitals:    21 0000 21 0354 21 0500 21 0815   BP: (!) 143/73 129/80  137/75   Pulse: 66 65  63   Resp: 18 18  18   Temp: 97.9 °F (36.6 °C) 97.9 °F (36.6 °C)  97.8 °F (36.6 °C)   TempSrc: Oral Oral  Oral   SpO2: 95% 95%  92%   Weight:   216 lb 8 oz (98.2 kg)    Height:                                                  BP Readings from Last 3 Encounters:   21 137/75   02/24/15 136/86   13 120/72       NPO Status:                                                                                 BMI:   Wt Readings from Last 3 Encounters:   21 216 lb 8 oz (98.2 kg)   02/24/15 220 lb (99.8 kg)   13 220 lb (99.8 kg)     Body mass index is 34.94 kg/m².     CBC:   Lab Results   Component Value Date    WBC 6.7 2021    RBC 4.86 2021    HGB 15.5 2021    HCT 46.2 2021    MCV 95.1 2021    RDW 12.9 2021     2021       CMP:   Lab Results   Component Value Date     2021    K 4.5 2021     2021    CO2 27 2021    BUN 24 2021    CREATININE 1.0 2021    GFRAA >60 2021    AGRATIO 1.5 2021    LABGLOM >60 2021    GLUCOSE 136 2021    PROT 7.6 2021    CALCIUM 9.5 2021    BILITOT 0.6 2021    ALKPHOS 61 2021    AST 38 2021    ALT 44 2021       POC Tests: No results for input(s): POCGLU, POCNA, POCK, POCCL, POCBUN, POCHEMO, POCHCT in the last 72 hours.    Coags:   Lab Results   Component Value Date    PROTIME 11.0 08/27/2012    INR 0.96 08/27/2012       HCG (If Applicable): No results found for: PREGTESTUR, PREGSERUM, HCG, HCGQUANT     ABGs: No results found for: PHART, PO2ART, AGX7GCR, KMZ5WYC, BEART, B1MYSJQP     Type & Screen (If Applicable):  No results found for: LABABO, LABRH    Drug/Infectious Status (If Applicable):  No results found for: HIV, HEPCAB    COVID-19 Screening (If Applicable):   Lab Results   Component Value Date    COVID19 Not Detected 04/07/2021           Anesthesia Evaluation  Patient summary reviewed no history of anesthetic complications:   Airway: Mallampati: II  TM distance: >3 FB   Neck ROM: full  Mouth opening: > = 3 FB Dental: normal exam   (+) partials      Pulmonary:Negative Pulmonary ROS and normal exam  breath sounds clear to auscultation                             Cardiovascular:Negative CV ROS  Exercise tolerance: good (>4 METS),   (+) hypertension:, CAD: obstructive, CABG/stent: no interval change,         Rhythm: regular  Rate: normal                    Neuro/Psych:   Negative Neuro/Psych ROS              GI/Hepatic/Renal: Neg GI/Hepatic/Renal ROS            Endo/Other: Negative Endo/Other ROS                    Abdominal:           Vascular: negative vascular ROS. Pre-Operative Diagnosis: Chest pain [R07.9]    66 y.o.   BMI:  Body mass index is 34.94 kg/m².      Vitals:    04/08/21 0000 04/08/21 0354 04/08/21 0500 04/08/21 0815   BP: (!) 143/73 129/80  137/75   Pulse: 66 65  63   Resp: 18 18  18   Temp: 97.9 °F (36.6 °C) 97.9 °F (36.6 °C)  97.8 °F (36.6 °C)   TempSrc: Oral Oral  Oral   SpO2: 95% 95%  92%   Weight:   216 lb 8 oz (98.2 kg)    Height:           Allergies   Allergen Reactions    Atorvastatin      nausea    Lisinopril Other (See Comments)     cough       Social History     Tobacco Use    Smoking status: Former Smoker     Quit date: 12/16/1985     Years since quitting: 35.3    Smokeless tobacco: Never Used   Substance Use Topics    Alcohol use: No       LABS:    CBC  Lab Results   Component Value Date/Time    WBC 6.7 04/07/2021 08:11 AM    HGB 15.5 04/07/2021 08:11 AM    HCT 46.2 04/07/2021 08:11 AM     04/07/2021 08:11 AM     RENAL  Lab Results   Component Value Date/Time     04/07/2021 08:11 AM    K 4.5 04/07/2021 08:11 AM     04/07/2021 08:11 AM    CO2 27 04/07/2021 08:11 AM    BUN 24 (H) 04/07/2021 08:11 AM    CREATININE 1.0 04/07/2021 08:11 AM    GLUCOSE 136 (H) 04/07/2021 08:11 AM     COAGS  Lab Results   Component Value Date/Time    PROTIME 11.0 08/27/2012 01:45 PM    INR 0.96 08/27/2012 01:45 PM     EKG 4/6/21  Normal sinus rhythmT wave abnormality, consider lateral ischemiaAbnormal ECGConfirmed by Daniela Colindres MD ([id number]) on 4/7/2021 5:36:39 PM    Stress test 4/6/21  Normal sinus rhythmT wave abnormality, consider lateral ischemiaAbnormal ECGConfirmed by Daniela Colindres MD ([id number]) on 4/7/2021 5:36:39 PM         Anesthesia Plan      general     ASA 3     (I discussed with the patient the risks and benefits of PIV, anesthesia, IV Narcotics, PACU. All questions were answered the patient agrees with the plan and wishes to proceed)  Induction: intravenous.                           Capri Freitas MD   4/8/2021

## 2021-05-04 ENCOUNTER — HOSPITAL ENCOUNTER (OUTPATIENT)
Dept: ULTRASOUND IMAGING | Age: 76
Discharge: HOME | End: 2021-05-04
Payer: MEDICARE

## 2021-05-04 DIAGNOSIS — R74.8 ELEVATED LIVER ENZYMES: ICD-10-CM

## 2021-05-04 PROCEDURE — 76705 ECHO EXAM OF ABDOMEN: CPT

## 2022-04-17 ENCOUNTER — APPOINTMENT (OUTPATIENT)
Dept: GENERAL RADIOLOGY | Age: 77
End: 2022-04-17
Payer: MEDICARE

## 2022-04-17 ENCOUNTER — APPOINTMENT (OUTPATIENT)
Dept: CT IMAGING | Age: 77
End: 2022-04-17
Payer: MEDICARE

## 2022-04-17 ENCOUNTER — HOSPITAL ENCOUNTER (EMERGENCY)
Age: 77
Discharge: HOME | End: 2022-04-17
Attending: STUDENT IN AN ORGANIZED HEALTH CARE EDUCATION/TRAINING PROGRAM
Payer: MEDICARE

## 2022-04-17 VITALS
DIASTOLIC BLOOD PRESSURE: 62 MMHG | WEIGHT: 180 LBS | RESPIRATION RATE: 14 BRPM | OXYGEN SATURATION: 97 % | SYSTOLIC BLOOD PRESSURE: 115 MMHG | HEART RATE: 64 BPM | TEMPERATURE: 96.5 F | BODY MASS INDEX: 28.25 KG/M2 | HEIGHT: 67 IN

## 2022-04-17 DIAGNOSIS — H81.11 BENIGN PAROXYSMAL POSITIONAL VERTIGO OF RIGHT EAR: Primary | ICD-10-CM

## 2022-04-17 LAB
A/G RATIO: 1.7 (ref 1.1–2.2)
ALBUMIN SERPL-MCNC: 4.4 G/DL (ref 3.4–5)
ALP BLD-CCNC: 84 U/L (ref 40–129)
ALT SERPL-CCNC: 14 U/L (ref 10–40)
ANION GAP SERPL CALCULATED.3IONS-SCNC: 11 MMOL/L (ref 3–16)
AST SERPL-CCNC: 20 U/L (ref 15–37)
BASOPHILS ABSOLUTE: 0 K/UL (ref 0–0.2)
BASOPHILS RELATIVE PERCENT: 0.3 %
BILIRUB SERPL-MCNC: 0.5 MG/DL (ref 0–1)
BILIRUBIN URINE: NEGATIVE
BLOOD, URINE: NEGATIVE
BUN BLDV-MCNC: 20 MG/DL (ref 7–20)
CALCIUM SERPL-MCNC: 9.1 MG/DL (ref 8.3–10.6)
CHLORIDE BLD-SCNC: 101 MMOL/L (ref 99–110)
CLARITY: CLEAR
CO2: 27 MMOL/L (ref 21–32)
COLOR: YELLOW
CREAT SERPL-MCNC: 1.1 MG/DL (ref 0.8–1.3)
EKG ATRIAL RATE: 55 BPM
EKG DIAGNOSIS: NORMAL
EKG P AXIS: 19 DEGREES
EKG P-R INTERVAL: 150 MS
EKG Q-T INTERVAL: 452 MS
EKG QRS DURATION: 106 MS
EKG QTC CALCULATION (BAZETT): 432 MS
EKG R AXIS: 31 DEGREES
EKG T AXIS: 56 DEGREES
EKG VENTRICULAR RATE: 55 BPM
EOSINOPHILS ABSOLUTE: 0.1 K/UL (ref 0–0.6)
EOSINOPHILS RELATIVE PERCENT: 1.7 %
GFR AFRICAN AMERICAN: >60
GFR NON-AFRICAN AMERICAN: >60
GLUCOSE BLD-MCNC: 158 MG/DL (ref 70–99)
GLUCOSE URINE: NEGATIVE MG/DL
HCT VFR BLD CALC: 43.6 % (ref 40.5–52.5)
HEMOGLOBIN: 14.6 G/DL (ref 13.5–17.5)
KETONES, URINE: NEGATIVE MG/DL
LEUKOCYTE ESTERASE, URINE: NEGATIVE
LYMPHOCYTES ABSOLUTE: 2.2 K/UL (ref 1–5.1)
LYMPHOCYTES RELATIVE PERCENT: 32.3 %
MCH RBC QN AUTO: 32.2 PG (ref 26–34)
MCHC RBC AUTO-ENTMCNC: 33.4 G/DL (ref 31–36)
MCV RBC AUTO: 96.4 FL (ref 80–100)
MICROSCOPIC EXAMINATION: NORMAL
MONOCYTES ABSOLUTE: 0.6 K/UL (ref 0–1.3)
MONOCYTES RELATIVE PERCENT: 8.6 %
NEUTROPHILS ABSOLUTE: 3.9 K/UL (ref 1.7–7.7)
NEUTROPHILS RELATIVE PERCENT: 57.1 %
NITRITE, URINE: NEGATIVE
PDW BLD-RTO: 12.7 % (ref 12.4–15.4)
PH UA: 6 (ref 5–8)
PLATELET # BLD: 284 K/UL (ref 135–450)
PMV BLD AUTO: 6.7 FL (ref 5–10.5)
POTASSIUM REFLEX MAGNESIUM: 4.2 MMOL/L (ref 3.5–5.1)
PROTEIN UA: NEGATIVE MG/DL
RBC # BLD: 4.53 M/UL (ref 4.2–5.9)
SODIUM BLD-SCNC: 139 MMOL/L (ref 136–145)
SPECIFIC GRAVITY UA: 1.02 (ref 1–1.03)
TOTAL PROTEIN: 7 G/DL (ref 6.4–8.2)
TROPONIN: <0.01 NG/ML
URINE REFLEX TO CULTURE: NORMAL
URINE TYPE: NORMAL
UROBILINOGEN, URINE: 0.2 E.U./DL
WBC # BLD: 6.8 K/UL (ref 4–11)

## 2022-04-17 PROCEDURE — 6360000004 HC RX CONTRAST MEDICATION: Performed by: STUDENT IN AN ORGANIZED HEALTH CARE EDUCATION/TRAINING PROGRAM

## 2022-04-17 PROCEDURE — 6360000002 HC RX W HCPCS: Performed by: STUDENT IN AN ORGANIZED HEALTH CARE EDUCATION/TRAINING PROGRAM

## 2022-04-17 PROCEDURE — 2580000003 HC RX 258: Performed by: STUDENT IN AN ORGANIZED HEALTH CARE EDUCATION/TRAINING PROGRAM

## 2022-04-17 PROCEDURE — 93005 ELECTROCARDIOGRAM TRACING: CPT | Performed by: STUDENT IN AN ORGANIZED HEALTH CARE EDUCATION/TRAINING PROGRAM

## 2022-04-17 PROCEDURE — 93010 ELECTROCARDIOGRAM REPORT: CPT | Performed by: INTERNAL MEDICINE

## 2022-04-17 PROCEDURE — 71045 X-RAY EXAM CHEST 1 VIEW: CPT

## 2022-04-17 PROCEDURE — 81003 URINALYSIS AUTO W/O SCOPE: CPT

## 2022-04-17 PROCEDURE — 80053 COMPREHEN METABOLIC PANEL: CPT

## 2022-04-17 PROCEDURE — 70498 CT ANGIOGRAPHY NECK: CPT

## 2022-04-17 PROCEDURE — 84484 ASSAY OF TROPONIN QUANT: CPT

## 2022-04-17 PROCEDURE — 70450 CT HEAD/BRAIN W/O DYE: CPT

## 2022-04-17 PROCEDURE — 99284 EMERGENCY DEPT VISIT MOD MDM: CPT

## 2022-04-17 PROCEDURE — 85025 COMPLETE CBC W/AUTO DIFF WBC: CPT

## 2022-04-17 PROCEDURE — 96374 THER/PROPH/DIAG INJ IV PUSH: CPT

## 2022-04-17 PROCEDURE — 6370000000 HC RX 637 (ALT 250 FOR IP): Performed by: STUDENT IN AN ORGANIZED HEALTH CARE EDUCATION/TRAINING PROGRAM

## 2022-04-17 RX ORDER — MECLIZINE HCL 12.5 MG/1
12.5 TABLET ORAL ONCE
Status: COMPLETED | OUTPATIENT
Start: 2022-04-17 | End: 2022-04-17

## 2022-04-17 RX ORDER — ONDANSETRON 2 MG/ML
4 INJECTION INTRAMUSCULAR; INTRAVENOUS ONCE
Status: COMPLETED | OUTPATIENT
Start: 2022-04-17 | End: 2022-04-17

## 2022-04-17 RX ORDER — 0.9 % SODIUM CHLORIDE 0.9 %
1000 INTRAVENOUS SOLUTION INTRAVENOUS ONCE
Status: COMPLETED | OUTPATIENT
Start: 2022-04-17 | End: 2022-04-17

## 2022-04-17 RX ORDER — MECLIZINE HCL 12.5 MG/1
12.5 TABLET ORAL 3 TIMES DAILY PRN
Qty: 15 TABLET | Refills: 0 | Status: SHIPPED | OUTPATIENT
Start: 2022-04-17 | End: 2022-04-27

## 2022-04-17 RX ADMIN — IOPAMIDOL 75 ML: 755 INJECTION, SOLUTION INTRAVENOUS at 04:54

## 2022-04-17 RX ADMIN — SODIUM CHLORIDE 1000 ML: 9 INJECTION, SOLUTION INTRAVENOUS at 03:49

## 2022-04-17 RX ADMIN — MECLIZINE 12.5 MG: 12.5 TABLET ORAL at 04:20

## 2022-04-17 RX ADMIN — ONDANSETRON 4 MG: 2 INJECTION INTRAMUSCULAR; INTRAVENOUS at 04:20

## 2022-04-17 ASSESSMENT — ENCOUNTER SYMPTOMS
SORE THROAT: 0
ABDOMINAL PAIN: 0
NAUSEA: 0
VOMITING: 0
SHORTNESS OF BREATH: 0
DIARRHEA: 0
BACK PAIN: 0
EYE PAIN: 0
COUGH: 0

## 2022-04-17 NOTE — ED NOTES
Discharge instructions explained by ED provider. Patient verbalized understanding and denies any other concerns or complaints at this time. Patient vital signs stable and no acute signs or symptoms of distress noted at discharge. Patient deemed clinically stable. Patient d/c home with spouse.      Joann Rosen RN  04/17/22 [id number]

## 2022-04-17 NOTE — ED NOTES
Pt and wife aware that urine sample is needed. Urinal at bedside.       Monica Walters, LAYLA  04/17/22 0312

## 2022-04-17 NOTE — ED PROVIDER NOTES
[address] [facility]  ED  EMERGENCY DEPARTMENT ENCOUNTER      Pt Name: Flaco Chase  MRN: [id number]  Albertogfharman 1945  Date of evaluation: 4/17/2022  Provider: Shadi Alvarez MD    CHIEF COMPLAINT       Chief Complaint   Patient presents with    Dizziness     Woke up, \"the whole room was spinning. \" Intermittent.  Nausea     dizziness    HISTORY OF PRESENT ILLNESS   (Location/Symptom, Timing/Onset,Context/Setting, Quality, Duration, Modifying Factors, Severity)  Note limiting factors. Flaco Chase is a 64 y.o. male who presents to the ED with a chief complaint of dizziness that woke him from sleep just prior to arrival.  LKW 10 PM which is approx 5 hours prior to arrival.  Denies anticoagulation. Describes dizziness as vertigo, room spinning, severe, causing nausea, now improved. Denies headache, chest pain, SOB, focal weakness, focal sensory change, speech change, falls, syncope, melena, hematochezia, hematemesis, diarrhea. Symptoms not otherwise alleviated or exacerbated by other factors. NursingNotes were reviewed. REVIEW OF SYSTEMS    (2-9 systems for level 4, 10 or more for level 5)     Review of Systems   Constitutional: Negative for chills and fever. HENT: Negative for congestion and sore throat. Eyes: Negative for pain and visual disturbance. Respiratory: Negative for cough and shortness of breath. Cardiovascular: Negative for chest pain and palpitations. Gastrointestinal: Negative for abdominal pain, diarrhea, nausea and vomiting. Genitourinary: Negative for dysuria and frequency. Musculoskeletal: Negative for back pain and neck pain. Skin: Negative for rash and wound. Neurological: Positive for dizziness. Negative for weakness and light-headedness.         PAST MEDICAL HISTORY     Past Medical History:   Diagnosis Date    Arthritis     joints    CAD (coronary artery disease) 2008    stents x2    Hyperlipidemia     Hypertension     Panic anxiety syndrome          SURGICALHISTORY       Past Surgical History:   Procedure Laterality Date    APPENDECTOMY      CHOLECYSTECTOMY      COLONOSCOPY  12/16/10    COLONOSCOPY  2015    Poylps    HERNIA REPAIR      bilateral inguinal    UPPER GASTROINTESTINAL ENDOSCOPY N/A 2021    EGD BIOPSY performed by Soheila Gomez MD at [address] [facility]       Discharge Medication List as of 2022  5:53 AM      CONTINUE these medications which have NOT CHANGED    Details   losartan (COZAAR) 50 MG tablet Take 1 tablet by mouth daily, Disp-30 tablet, R-3Normal      metoprolol succinate (TOPROL XL) 25 MG extended release tablet Take 1 tablet by mouth 2 times daily, Disp-30 tablet, R-3Normal      pantoprazole (PROTONIX) 40 MG tablet Take 1 tablet by mouth every morning (before breakfast), Disp-30 tablet, R-3Normal      rosuvastatin (CRESTOR) 10 MG tablet Take 20 mg by mouth daily. aspirin 325 MG tablet Take 325 mg by mouth daily. doxepin (SINEQUAN) 100 MG capsule Take 75 mg by mouth nightly. loratadine (CLARITIN) 10 MG tablet Take 10 mg by mouth daily. fluticasone (FLONASE) 50 MCG/ACT nasal spray 2 sprays by Nasal route daily.              ALLERGIES     Atorvastatin and Lisinopril    FAMILY HISTORY       Family History   Problem Relation Age of Onset    Heart Disease Father           SOCIAL HISTORY       Social History     Socioeconomic History    Marital status:      Spouse name: None    Number of children: None    Years of education: None    Highest education level: None   Occupational History    None   Tobacco Use    Smoking status: Former Smoker     Quit date: 1985     Years since quittin.3    Smokeless tobacco: Never Used   Vaping Use    Vaping Use: Never used   Substance and Sexual Activity    Alcohol use: No    Drug use: Never    Sexual activity: Yes     Partners: Female   Other Topics Concern    None   Social History Narrative    None     Social Determinants of Health     Financial Resource Strain:     Difficulty of Paying Living Expenses: Not on file   Food Insecurity:     Worried About Running Out of Food in the Last Year: Not on file    Danyel of Food in the Last Year: Not on file   Transportation Needs:     Lack of Transportation (Medical): Not on file    Lack of Transportation (Non-Medical): Not on file   Physical Activity:     Days of Exercise per Week: Not on file    Minutes of Exercise per Session: Not on file   Stress:     Feeling of Stress : Not on file   Social Connections:     Frequency of Communication with Friends and Family: Not on file    Frequency of Social Gatherings with Friends and Family: Not on file    Attends [employer] Services: Not on file    Active Member of [address] [place] [address] [employer] or Organizations: Not on file    Attends Club or Organization Meetings: Not on file    Marital Status: Not on file   Intimate Partner Violence:     Fear of Current or Ex-Partner: Not on file    Emotionally Abused: Not on file    Physically Abused: Not on file    Sexually Abused: Not on file   Housing Stability:     Unable to Pay for Housing in the Last Year: Not on file    Number of [place] in the Last Year: Not on file    Unstable Housing in the Last Year: Not on file       SCREENINGS   NIH Stroke Scale  Interval: Baseline  Level of Consciousness (1a): Alert  LOC Questions (1b): Answers both correctly  LOC Commands (1c): Performs both tasks correctly  Best Gaze (2): Normal  Visual (3): No visual loss  Facial Palsy (4): Normal symmetrical movement  Motor Arm, Left (5a): No drift  Motor Arm, Right (5b): No drift  Motor Leg, Left (6a): No drift  Motor Leg, Right (6b):  No drift  Limb Ataxia (7): Absent  Sensory (8): Normal  Best Language (9): No aphasia  Dysarthria (10): Normal  Extinction and Inattention (11): No abnormality  Total: 0[place]asgow Coma Scale  Eye Opening: Spontaneous  Best Verbal Response: Oriented  Best Motor Response: Obeys commands  Nicolette Coma Scale Score: 15        PHYSICAL EXAM    (up to 7 for level 4, 8 or more for level 5)     ED Triage Vitals   BP Temp Temp Source Pulse Resp SpO2 Height Weight   04/17/22 0314 04/17/22 0317 04/17/22 0314 04/17/22 0314 04/17/22 0314 04/17/22 0314 04/17/22 0314 04/17/22 0314   135/72 96.5 °F (35.8 °C) Oral 61 18 95 % 5' 7\" (1.702 m) 180 lb (81.6 kg)       General: Alert and oriented appropriately for age, No acute distress. Eye: Normal conjunctiva. Sclera anicteric. HENT: Oral mucosa is moist.  Respiratory: Respirations even and non-labored. Clear to auscultation bilaterally. Cardiovascular: Normal rate, Regular rhythm. Intact peripheral pulses. Gastrointestinal: Soft, Non-tender, Non-distended. : deferred. Musculoskeletal: No swelling. Integumentary: Warm, Dry. Neurologic: Alert and appropriate for age. GCS 15. Cranial nerves II through XII intact. Strength 5 out of 5 throughout. Sensation intact throughout. No drift. Speech clear. Steady gait. Positive [place]-Hallpike. Ultimately negative test of skew (see MDM for clarification). Reassuring hints exam.  No focal deficits. Psychiatric: Cooperative. DIAGNOSTIC RESULTS     EKG: All EKG's are interpreted by the Emergency Department Physician who either signs or Co-signsthis chart in the absence of a cardiologist.      The Ekg interpreted by me shows  Rhythm sinus bradycardia  Rate of 55 bpm  Axis is  normal  Intervals and durations normal  ST Segments: normal  Compared to prior EKG dated 4/6/2021, no longer with lateral TWI.       RADIOLOGY:   Non-plain filmimages such as CT, Ultrasound and MRI are read by the radiologist. Plain radiographic images are visualized and preliminarily interpreted by the emergency physician with the below findings:      Interpretation per the Radiologist below, if available at the time ofthis note:    CT Head WO Contrast   Final Result   Generalized age-appropriate cortical atrophy, with chronic microvascular   ischemic changes. No acute wedge-shaped area of ischemia is identified. No intracranial   hemorrhage. Intracranial atherosclerotic disease. CTA HEAD NECK W CONTRAST   Final Result   Unremarkable CTA of the head and neck. XR CHEST PORTABLE   Final Result   Clear chest without acute focal infiltrate. LABS:  Labs Reviewed   COMPREHENSIVE METABOLIC PANEL W/ REFLEX TO MG FOR LOW K - Abnormal; Notable for the following components:       Result Value    Glucose 158 (*)     All other components within normal limits   CBC WITH AUTO DIFFERENTIAL   TROPONIN   URINALYSIS WITH REFLEX TO CULTURE       All other labs were within normal range or not returned as of this dictation. EMERGENCY DEPARTMENT COURSE and DIFFERENTIAL DIAGNOSIS/MDM:   Vitals:    Vitals:    22 0317 22 0333 22 0421 22 0504   BP:  (!) 115/54 (!) 105/50 115/62   Pulse:   54 64   Resp:   14 14   Temp: 96.5 °F (35.8 °C)      TempSrc: Tympanic      SpO2:   94% 97%   Weight:       Height:             Medical decision makin yo M with PMHx of HTN, HLD, CAD who p/w vertigo waking him from sleep just prior to arrival, LKW 10 PM > 5 hours ago, out of tPA window. NIHSS 0, witnessed to have corrective horizontal saccade on test of skew examination which was initially interpreted to be a positive test, on review of the exam criteria, this is actually not a positive exam finding, while a vertical skew is, which the patient does not exhibit. Because of the initial interpretation, a CTH and CTA was obtained which demonstrated some intracranial athero but no acute process, no LVO, no flow limiting stenosis of the posterior circulation.   Pt takes ASA daily which would be the tx if this was a posterior circulation CVA at this juncture but this is much more likely BPPV given his repeat examination, ultimately negative HINTS exam and exhibition of a positive [employer] test with reproducibility and rapid fatiguability on repeat examination. Rest of lab and EKG w/u unremarkable. Educated on Epley maneuver and given ENT follow up, return precautions, pt voiced understanding. Stable for and amenable to d/c home. Asymptomatic on reassessments. Ambulatory steadily in the ED. Medications   0.9 % sodium chloride bolus (0 mLs IntraVENous Stopped 4/17/22 0555)   meclizine (ANTIVERT) tablet 12.5 mg (12.5 mg Oral Given 4/17/22 0420)   ondansetron (ZOFRAN) injection 4 mg (4 mg IntraVENous Given 4/17/22 2700)   iopamidol (ISOVUE-370) 76 % injection 75 mL (75 mLs IntraVENous Given 4/17/22 2916)     I estimate there is LOW risk for (including but not limited to) INTRACRANIAL HEMORRHAGE, ISCHEMIC CVA,  MENINGITIS, ACUTE CORONARY SYNDROME, MALIGNANT DYSRHYTHMIA, PULMONARY EMBOLISM, PNEUMONIA or SEPSIS, thus I consider the discharge disposition reasonable. Discharge is especially supported since the patient has improvement of presenting symptoms, demonstrates steady ambulation at their baseline in the emergency department on my reassessment, persists to have no focal neurologic deficits on my reexamination, and demonstrates robust p.o. tolerance. [employer] (or their surrogate) and I have discussed the diagnosis and risks, and we agree with discharging home with close follow-up. We also discussed returning to the Emergency Department immediately if new or worsening symptoms occur. We have discussed the symptoms which are most concerning that necessitate immediate return. FINAL IMPRESSION      1.  Benign paroxysmal positional vertigo of right ear          DISPOSITION/PLAN   DISPOSITION Decision To Discharge 04/17/2022 05:50:17 AM      PATIENT REFERRED TO:  Alex Castellanos MD  [facility] [id number] [phone]    In 2 days      [facility]  ED  [address] [facility] [address]  [address]    If symptoms worsen    [address]rade [address]  [phone]    In 1 day        DISCHARGE MEDICATIONS:  Discharge Medication List as of 4/17/2022  5:53 AM      START taking these medications    Details   meclizine (ANTIVERT) 12.5 MG tablet Take 1 tablet by mouth 3 times daily as needed for Dizziness, Disp-15 tablet, R-0Print                (Please note that portions of this note were completed with a voice recognition program.Efforts were made to edit the dictations but occasionally words are mis-transcribed.)    Mata Arias MD (electronically signed)  Attending Emergency Physician          Mata Arias MD  04/17/22 0964

## 2022-06-13 ENCOUNTER — HOSPITAL ENCOUNTER (EMERGENCY)
Age: 77
Discharge: HOME | End: 2022-06-13
Attending: EMERGENCY MEDICINE
Payer: MEDICARE

## 2022-06-13 VITALS
RESPIRATION RATE: 16 BRPM | SYSTOLIC BLOOD PRESSURE: 126 MMHG | BODY MASS INDEX: 27.97 KG/M2 | HEIGHT: 66 IN | OXYGEN SATURATION: 93 % | HEART RATE: 67 BPM | DIASTOLIC BLOOD PRESSURE: 70 MMHG | WEIGHT: 174 LBS | TEMPERATURE: 97.8 F

## 2022-06-13 DIAGNOSIS — T78.3XXA ANGIOEDEMA, INITIAL ENCOUNTER: Primary | ICD-10-CM

## 2022-06-13 LAB
A/G RATIO: 1.2 (ref 1.1–2.2)
ALBUMIN SERPL-MCNC: 4.1 G/DL (ref 3.4–5)
ALP BLD-CCNC: 82 U/L (ref 40–129)
ALT SERPL-CCNC: 21 U/L (ref 10–40)
ANION GAP SERPL CALCULATED.3IONS-SCNC: 10 MMOL/L (ref 3–16)
AST SERPL-CCNC: 35 U/L (ref 15–37)
BASOPHILS ABSOLUTE: 0 K/UL (ref 0–0.2)
BASOPHILS RELATIVE PERCENT: 0.4 %
BILIRUB SERPL-MCNC: 0.3 MG/DL (ref 0–1)
BUN BLDV-MCNC: 23 MG/DL (ref 7–20)
CALCIUM SERPL-MCNC: 9.9 MG/DL (ref 8.3–10.6)
CHLORIDE BLD-SCNC: 103 MMOL/L (ref 99–110)
CO2: 24 MMOL/L (ref 21–32)
CREAT SERPL-MCNC: 0.9 MG/DL (ref 0.8–1.3)
EKG ATRIAL RATE: 88 BPM
EKG DIAGNOSIS: NORMAL
EKG P AXIS: -8 DEGREES
EKG P-R INTERVAL: 130 MS
EKG Q-T INTERVAL: 386 MS
EKG QRS DURATION: 110 MS
EKG QTC CALCULATION (BAZETT): 467 MS
EKG R AXIS: -7 DEGREES
EKG T AXIS: -4 DEGREES
EKG VENTRICULAR RATE: 88 BPM
EOSINOPHILS ABSOLUTE: 0.1 K/UL (ref 0–0.6)
EOSINOPHILS RELATIVE PERCENT: 1.9 %
GFR AFRICAN AMERICAN: >60
GFR NON-AFRICAN AMERICAN: >60
GLUCOSE BLD-MCNC: 107 MG/DL (ref 70–99)
HCT VFR BLD CALC: 48.7 % (ref 40.5–52.5)
HEMOGLOBIN: 16 G/DL (ref 13.5–17.5)
LYMPHOCYTES ABSOLUTE: 2.5 K/UL (ref 1–5.1)
LYMPHOCYTES RELATIVE PERCENT: 33.1 %
MCH RBC QN AUTO: 32.1 PG (ref 26–34)
MCHC RBC AUTO-ENTMCNC: 32.8 G/DL (ref 31–36)
MCV RBC AUTO: 97.8 FL (ref 80–100)
MONOCYTES ABSOLUTE: 0.7 K/UL (ref 0–1.3)
MONOCYTES RELATIVE PERCENT: 8.7 %
NEUTROPHILS ABSOLUTE: 4.3 K/UL (ref 1.7–7.7)
NEUTROPHILS RELATIVE PERCENT: 55.9 %
PDW BLD-RTO: 13 % (ref 12.4–15.4)
PLATELET # BLD: 387 K/UL (ref 135–450)
PMV BLD AUTO: 6.3 FL (ref 5–10.5)
POTASSIUM SERPL-SCNC: 4.1 MMOL/L (ref 3.5–5.1)
POTASSIUM SERPL-SCNC: 5.3 MMOL/L (ref 3.5–5.1)
RBC # BLD: 4.98 M/UL (ref 4.2–5.9)
SODIUM BLD-SCNC: 137 MMOL/L (ref 136–145)
TOTAL PROTEIN: 7.5 G/DL (ref 6.4–8.2)
TROPONIN: <0.01 NG/ML
WBC # BLD: 7.7 K/UL (ref 4–11)

## 2022-06-13 PROCEDURE — 96372 THER/PROPH/DIAG INJ SC/IM: CPT

## 2022-06-13 PROCEDURE — 2500000003 HC RX 250 WO HCPCS: Performed by: EMERGENCY MEDICINE

## 2022-06-13 PROCEDURE — 96375 TX/PRO/DX INJ NEW DRUG ADDON: CPT

## 2022-06-13 PROCEDURE — 84484 ASSAY OF TROPONIN QUANT: CPT

## 2022-06-13 PROCEDURE — 85025 COMPLETE CBC W/AUTO DIFF WBC: CPT

## 2022-06-13 PROCEDURE — 93005 ELECTROCARDIOGRAM TRACING: CPT | Performed by: EMERGENCY MEDICINE

## 2022-06-13 PROCEDURE — 83520 IMMUNOASSAY QUANT NOS NONAB: CPT

## 2022-06-13 PROCEDURE — 84132 ASSAY OF SERUM POTASSIUM: CPT

## 2022-06-13 PROCEDURE — 6360000002 HC RX W HCPCS: Performed by: EMERGENCY MEDICINE

## 2022-06-13 PROCEDURE — 96374 THER/PROPH/DIAG INJ IV PUSH: CPT

## 2022-06-13 PROCEDURE — 80053 COMPREHEN METABOLIC PANEL: CPT

## 2022-06-13 PROCEDURE — 93010 ELECTROCARDIOGRAM REPORT: CPT | Performed by: INTERNAL MEDICINE

## 2022-06-13 PROCEDURE — 99284 EMERGENCY DEPT VISIT MOD MDM: CPT

## 2022-06-13 RX ORDER — EPINEPHRINE 1 MG/ML
0.3 INJECTION, SOLUTION, CONCENTRATE INTRAVENOUS ONCE
Status: COMPLETED | OUTPATIENT
Start: 2022-06-13 | End: 2022-06-13

## 2022-06-13 RX ORDER — DIPHENHYDRAMINE HYDROCHLORIDE 50 MG/ML
12.5 INJECTION INTRAMUSCULAR; INTRAVENOUS ONCE
Status: COMPLETED | OUTPATIENT
Start: 2022-06-13 | End: 2022-06-13

## 2022-06-13 RX ORDER — CETIRIZINE HYDROCHLORIDE 10 MG/1
5 TABLET ORAL 2 TIMES DAILY
Qty: 5 TABLET | Refills: 0 | Status: SHIPPED | OUTPATIENT
Start: 2022-06-13 | End: 2022-06-18

## 2022-06-13 RX ORDER — FAMOTIDINE 20 MG/1
20 TABLET, FILM COATED ORAL 2 TIMES DAILY
Qty: 10 TABLET | Refills: 0 | Status: ON HOLD | OUTPATIENT
Start: 2022-06-13 | End: 2022-08-11 | Stop reason: HOSPADM

## 2022-06-13 RX ORDER — FAMOTIDINE 10 MG/ML
20 INJECTION, SOLUTION INTRAVENOUS ONCE
Status: COMPLETED | OUTPATIENT
Start: 2022-06-13 | End: 2022-06-13

## 2022-06-13 RX ORDER — DEXAMETHASONE SODIUM PHOSPHATE 10 MG/ML
8 INJECTION INTRAMUSCULAR; INTRAVENOUS ONCE
Status: COMPLETED | OUTPATIENT
Start: 2022-06-13 | End: 2022-06-13

## 2022-06-13 RX ADMIN — EPINEPHRINE 0.3 MG: 1 INJECTION, SOLUTION, CONCENTRATE INTRAVENOUS at 07:23

## 2022-06-13 RX ADMIN — DIPHENHYDRAMINE HYDROCHLORIDE 12.5 MG: 50 INJECTION, SOLUTION INTRAMUSCULAR; INTRAVENOUS at 07:22

## 2022-06-13 RX ADMIN — DEXAMETHASONE SODIUM PHOSPHATE 8 MG: 10 INJECTION INTRAMUSCULAR; INTRAVENOUS at 07:22

## 2022-06-13 RX ADMIN — FAMOTIDINE 20 MG: 10 INJECTION INTRAVENOUS at 07:23

## 2022-06-13 ASSESSMENT — PAIN - FUNCTIONAL ASSESSMENT: PAIN_FUNCTIONAL_ASSESSMENT: NONE - DENIES PAIN

## 2022-06-13 NOTE — ED PROVIDER NOTES
[facility]  ED  EMERGENCY DEPARTMENT ENCOUNTER        Pt Name: Melanie He  MRN: [id number]  [place] 1945  Date of evaluation: 6/13/2022  Provider: Elliot Burris MD  PCP: Issa Delvalle MD      [address] [place] [address]       Chief Complaint   Patient presents with    Oral Swelling     pt reporting facial swelling for a month. states his doctor took him off several medications. pt reports he woke up this morning with tongue swelling. HISTORY OFPRESENT ILLNESS   (Location/Symptom, Timing/Onset, Context/Setting, Quality, Duration, Modifying Factors,Severity)  Note limiting factors. Melanie He is a 76 y.o. male presenting today due to concern for waking up this morning and noticing that the right side of his tongue was significantly swollen and he was having slight difficulty with swallowing and ultimately came to the emergency department due to concern for angioedema. He denies any rashes or fever. No nausea or vomiting. No diarrhea or abdominal pain. No neck pain or stiffness. No chest pain or shortness of breath. No lightheadedness or syncope. He had been treating for angioedema related to his lips recently and is supposed to see an allergist in 3 days but was told if he ever had any tongue or throat involvement to go to the emergency department immediately for further assessment. He has been taking loratadine. He had been on lisinopril in the past but states he quit this. Due to concern for abnormal tongue swelling on the right side, he came to the ED for further assessment. He does report slight change in his voice as well. No current trouble breathing per patient. REVIEW OF SYSTEMS    (2-9 systems for level 4, 10 or more for level 5)     Review of Systems   Constitutional: Negative for chills, diaphoresis, fatigue and fever. HENT: Positive for trouble swallowing (mild) and voice change (mild).  Negative for congestion, dental problem, facial swelling (tongue swelling only), sinus pressure, sinus pain and sore throat. Eyes: Negative for visual disturbance. Respiratory: Negative for chest tightness and shortness of breath. Cardiovascular: Negative for chest pain. Gastrointestinal: Negative for abdominal pain, diarrhea, nausea and vomiting. Genitourinary: Negative for flank pain. Musculoskeletal: Negative for back pain, gait problem, neck pain and neck stiffness. Skin: Negative for color change and wound (no falls or trauma). Neurological: Negative for dizziness, syncope, speech difficulty, weakness, light-headedness, numbness and headaches. Psychiatric/Behavioral: The patient is nervous/anxious. Positives and Pertinent negatives as per HPI. PASTMEDICAL HISTORY     Past Medical History:   Diagnosis Date    Arthritis     joints    CAD (coronary artery disease) 2008    stents x2    Hyperlipidemia     Hypertension     Panic anxiety syndrome          SURGICAL HISTORY       Past Surgical History:   Procedure Laterality Date    APPENDECTOMY      CHOLECYSTECTOMY      COLONOSCOPY  12/16/10    COLONOSCOPY  2/24/2015    Poylps    HERNIA REPAIR      bilateral inguinal    UPPER GASTROINTESTINAL ENDOSCOPY N/A 4/8/2021    EGD BIOPSY performed by Srinivasa Marie MD at [address]       Discharge Medication List as of 6/13/2022 10:52 AM      CONTINUE these medications which have NOT CHANGED    Details   losartan (COZAAR) 50 MG tablet Take 1 tablet by mouth daily, Disp-30 tablet, R-3Normal      metoprolol succinate (TOPROL XL) 25 MG extended release tablet Take 1 tablet by mouth 2 times daily, Disp-30 tablet, R-3Normal      pantoprazole (PROTONIX) 40 MG tablet Take 1 tablet by mouth every morning (before breakfast), Disp-30 tablet, R-3Normal      rosuvastatin (CRESTOR) 10 MG tablet Take 20 mg by mouth daily. aspirin 325 MG tablet Take 325 mg by mouth daily.         doxepin (SINEQUAN) 100 MG capsule Take 75 mg by mouth nightly. fluticasone (FLONASE) 50 MCG/ACT nasal spray 2 sprays by Nasal route daily. ALLERGIES     Atorvastatin and Lisinopril    FAMILY HISTORY       Family History   Problem Relation Age of Onset    Heart Disease Father           SOCIAL HISTORY       Social History     Socioeconomic History    Marital status:      Spouse name: None    Number of children: None    Years of education: None    Highest education level: None   Occupational History    None   Tobacco Use    Smoking status: Former Smoker     Quit date: 1985     Years since quittin.5    Smokeless tobacco: Never Used   Vaping Use    Vaping Use: Never used   Substance and Sexual Activity    Alcohol use: No    Drug use: Never    Sexual activity: Yes     Partners: Female   Other Topics Concern    None   Social History Narrative    None     Social Determinants of Health     Financial Resource Strain:     Difficulty of Paying Living Expenses: Not on file   Food Insecurity:     Worried About Running Out of Food in the Last Year: Not on file    Danyel of Food in the Last Year: Not on file   Transportation Needs:     Lack of Transportation (Medical): Not on file    Lack of Transportation (Non-Medical):  Not on file   Physical Activity:     Days of Exercise per Week: Not on file    Minutes of Exercise per Session: Not on file   Stress:     Feeling of Stress : Not on file   Social Connections:     Frequency of Communication with Friends and Family: Not on file    Frequency of Social Gatherings with Friends and Family: Not on file    Attends Restorationist Services: Not on file    Active Member of Clubs or Organizations: Not on file    Attends Club or Organization Meetings: Not on file    Marital Status: Not on file   Intimate Partner Violence:     Fear of Current or Ex-Partner: Not on file    Emotionally Abused: Not on file    Physically Abused: Not on file    Sexually Abused: Not on file   Housing Stability:     Unable to Pay for Housing in the Last Year: Not on file    Number of Places Lived in the Last Year: Not on file    Unstable Housing in the Last Year: Not on file       SCREENINGS                PHYSICAL EXAM    (up to 7 for level 4, 8 or more for level 5)     ED Triage Vitals   BP Temp Temp src Pulse Resp SpO2 Height Weight   -- -- -- -- -- -- -- --       Physical Exam  Vitals and nursing note reviewed. Constitutional:       General: He is awake. He is not in acute distress. Appearance: Normal appearance. He is well-developed, well-groomed and overweight. He is not ill-appearing, toxic-appearing or diaphoretic. HENT:      Head: Normocephalic and atraumatic. No raccoon eyes, Maxwell's sign, abrasion, contusion, masses, right periorbital erythema, left periorbital erythema or laceration. Hair is normal.      Jaw: There is normal jaw occlusion. No trismus, tenderness, swelling, pain on movement or malocclusion. Right Ear: External ear normal.      Left Ear: External ear normal.      Nose: Nose normal. No congestion. Right Sinus: No maxillary sinus tenderness or frontal sinus tenderness. Left Sinus: No maxillary sinus tenderness or frontal sinus tenderness. Mouth/Throat:      Lips: Pink. No lesions. Mouth: Mucous membranes are moist. Angioedema (right side of tongue only) present. No injury, lacerations or oral lesions. Dentition: No dental tenderness. Palate: No mass and lesions. Pharynx: Oropharynx is clear. Uvula midline. No pharyngeal swelling, oropharyngeal exudate, posterior oropharyngeal erythema or uvula swelling. Comments: Mallampati 2 noted currently   Eyes:      General: No scleral icterus. Right eye: No discharge. Left eye: No discharge. Conjunctiva/sclera: Conjunctivae normal.      Pupils: Pupils are equal, round, and reactive to light. Neck:      Thyroid: No thyroid tenderness. Vascular: No JVD. Trachea: Trachea normal. No tracheal tenderness or tracheal deviation. Comments: No nuchal rigidity   Voice slightly muffled on exam initially   Cardiovascular:      Rate and Rhythm: Normal rate and regular rhythm. Pulses: Normal pulses. Pulmonary:      Effort: Pulmonary effort is normal. No accessory muscle usage or respiratory distress. Breath sounds: Normal breath sounds. No stridor. No decreased breath sounds, wheezing, rhonchi or rales. Chest:      Chest wall: No tenderness. Abdominal:      General: Bowel sounds are normal. There is no distension. Palpations: Abdomen is soft. Tenderness: There is no abdominal tenderness. There is no guarding or rebound. Musculoskeletal:         General: No tenderness, deformity or signs of injury. Normal range of motion. Cervical back: Full passive range of motion without pain, normal range of motion and neck supple. No edema, erythema, signs of trauma, rigidity, torticollis, tenderness or crepitus. No pain with movement, spinous process tenderness or muscular tenderness. Normal range of motion. Lymphadenopathy:      Cervical: No cervical adenopathy. Skin:     General: Skin is warm and dry. Coloration: Skin is not jaundiced or pale. Findings: No erythema or rash. Neurological:      General: No focal deficit present. Mental Status: He is alert and oriented to person, place, and time. Mental status is at baseline. GCS: GCS eye subscore is 4. GCS verbal subscore is 5. GCS motor subscore is 6. Sensory: No sensory deficit. Motor: Motor function is intact. No weakness, tremor, atrophy, abnormal muscle tone or seizure activity. Gait: Gait is intact. Gait normal.   Psychiatric:         Mood and Affect: Mood normal.         Behavior: Behavior normal. Behavior is cooperative.              DIAGNOSTIC RESULTS   :    Labs Reviewed   COMPREHENSIVE METABOLIC PANEL - Abnormal; Notable for the following components:       Result Value    Potassium 5.3 (*)     Glucose 107 (*)     BUN 23 (*)     All other components within normal limits   CBC WITH AUTO DIFFERENTIAL   TROPONIN   POTASSIUM   TRYPTASE       All other labs were within normal range or not returned asof this dictation. EKG: All EKG's are interpreted by the Emergency Department Physician who either signs or Co-signs this chart in the absence of a cardiologist.    The Ekg interpreted by me shows  normal sinus rhythm with a rate of 88  Axis is   Normal  QTc is  within an acceptable range  Intervals and Durations are unremarkable. ST Segments: no acute change and nonspecific changes  No significant change from prior EKG dated - 4/17/22  No STEMI           RADIOLOGY:   Non-plain film images such as CT, Ultrasound and MRI are read by the radiologist. Magdaline Fujita images are visualized and preliminarily interpreted by the  ED Provider with the belowfindings:        Interpretation per the Radiologist below, if available at the time of this note:    No orders to display         PROCEDURES   Unless otherwise noted below, none     Procedures    CRITICAL CARE TIME   Time: 40 minutes   Includes repeat examinations, speaking with consultants, lab interpretation, charting, treating for acute angioedema with frequent airway assessment to ensure no need for intubation along with giving IM epinephrine  Excludes separate billable procedures. Patient at risk for serious decompensation if not treated for this life-threatening condition. CONSULTS: Spoke with Dr. Rae Alvares with immunology and he reports tell the patient to take Zyrtec twice daily along with Pepcid twice daily but no need for steroids at this time and have him follow-up in the office in 3 days as scheduled and call sooner for any other concerns to the office.   IP CONSULT TO ALLERGY & IMMUNOLOGY    EMERGENCY DEPARTMENT COURSE and DIFFERENTIAL DIAGNOSIS/MDM:   Vitals:    Vitals:    06/13/22 4113 he does develop any return of tongue swelling or any airway concerns, then return immediately to the emergency department, but otherwise was well-appearing and in no acute distress at time of discharge and the patient and wife felt comfortable this plan. The patient tolerated their visit well. The patient and / or the family were informed of the results of any tests, a time was given to answer questions. FINAL IMPRESSION      1.  Angioedema, initial encounter          DISPOSITION/PLAN   DISPOSITION Decision To Discharge 06/13/2022 10:48:04 AM      PATIENT REFERRED TO:  [facility]  ED  [address]  Go to   If symptoms worsen    Nicole Staley MD  [address] [place] [phone]    Call in 1 day  As needed    Vera Aguilera MD  [address]  [place] [phone]    Go on 6/16/2022  As scheduled      DISCHARGEMEDICATIONS:  Discharge Medication List as of 6/13/2022 10:52 AM      START taking these medications    Details   cetirizine (ZYRTEC) 10 MG tablet Take 0.5 tablets by mouth in the morning and at bedtime for 5 days, Disp-5 tablet, R-0Print      famotidine (PEPCID) 20 MG tablet Take 1 tablet by mouth 2 times daily for 5 days, Disp-10 tablet, R-0Print             DISCONTINUED MEDICATIONS:  Discharge Medication List as of 6/13/2022 10:52 AM      STOP taking these medications       loratadine (CLARITIN) 10 MG tablet Comments:   Reason for Stopping:                      (Please note that portions of this note were completed with a voicerecognition program.  Efforts were made to edit the dictations but occasionally words are mis-transcribed.)    Paradise Simpson MD (electronically signed)            Paradise Simpson MD  06/14/22 7162

## 2022-06-13 NOTE — ED NOTES
[place] [address] Immunology Per Db Mercer   RE:Dr. Zora Barkley - allergist  @[phone]  spoke to Db Mercer

## 2022-06-14 ASSESSMENT — ENCOUNTER SYMPTOMS
NAUSEA: 0
VOICE CHANGE: 1
CHEST TIGHTNESS: 0
SINUS PRESSURE: 0
FACIAL SWELLING: 0
VOMITING: 0
DIARRHEA: 0
BACK PAIN: 0
COLOR CHANGE: 0
TROUBLE SWALLOWING: 1
SINUS PAIN: 0
ABDOMINAL PAIN: 0
SORE THROAT: 0
SHORTNESS OF BREATH: 0

## 2022-06-15 LAB — TRYPTASE: 4 UG/L

## 2022-07-08 ENCOUNTER — HOSPITAL ENCOUNTER (EMERGENCY)
Age: 77
Discharge: HOME | End: 2022-07-08
Attending: STUDENT IN AN ORGANIZED HEALTH CARE EDUCATION/TRAINING PROGRAM
Payer: MEDICARE

## 2022-07-08 VITALS
BODY MASS INDEX: 27.97 KG/M2 | TEMPERATURE: 98 F | SYSTOLIC BLOOD PRESSURE: 117 MMHG | HEART RATE: 64 BPM | RESPIRATION RATE: 18 BRPM | WEIGHT: 174 LBS | HEIGHT: 66 IN | OXYGEN SATURATION: 96 % | DIASTOLIC BLOOD PRESSURE: 76 MMHG

## 2022-07-08 DIAGNOSIS — T78.3XXA ANGIOEDEMA, INITIAL ENCOUNTER: Primary | ICD-10-CM

## 2022-07-08 LAB
A/G RATIO: 1.4 (ref 1.1–2.2)
ALBUMIN SERPL-MCNC: 4.2 G/DL (ref 3.4–5)
ALP BLD-CCNC: 79 U/L (ref 40–129)
ALT SERPL-CCNC: 16 U/L (ref 10–40)
ANION GAP SERPL CALCULATED.3IONS-SCNC: 10 MMOL/L (ref 3–16)
AST SERPL-CCNC: 20 U/L (ref 15–37)
BASOPHILS ABSOLUTE: 0 K/UL (ref 0–0.2)
BASOPHILS RELATIVE PERCENT: 0.3 %
BILIRUB SERPL-MCNC: 0.5 MG/DL (ref 0–1)
BUN BLDV-MCNC: 24 MG/DL (ref 7–20)
CALCIUM SERPL-MCNC: 9.8 MG/DL (ref 8.3–10.6)
CHLORIDE BLD-SCNC: 102 MMOL/L (ref 99–110)
CO2: 27 MMOL/L (ref 21–32)
CREAT SERPL-MCNC: 1.2 MG/DL (ref 0.8–1.3)
EOSINOPHILS ABSOLUTE: 0.2 K/UL (ref 0–0.6)
EOSINOPHILS RELATIVE PERCENT: 2.2 %
GFR AFRICAN AMERICAN: >60
GFR NON-AFRICAN AMERICAN: 58
GLUCOSE BLD-MCNC: 102 MG/DL (ref 70–99)
HCT VFR BLD CALC: 45.9 % (ref 40.5–52.5)
HEMOGLOBIN: 15.4 G/DL (ref 13.5–17.5)
LYMPHOCYTES ABSOLUTE: 2.6 K/UL (ref 1–5.1)
LYMPHOCYTES RELATIVE PERCENT: 29.9 %
MCH RBC QN AUTO: 32.4 PG (ref 26–34)
MCHC RBC AUTO-ENTMCNC: 33.6 G/DL (ref 31–36)
MCV RBC AUTO: 96.4 FL (ref 80–100)
MONOCYTES ABSOLUTE: 0.8 K/UL (ref 0–1.3)
MONOCYTES RELATIVE PERCENT: 8.7 %
NEUTROPHILS ABSOLUTE: 5.2 K/UL (ref 1.7–7.7)
NEUTROPHILS RELATIVE PERCENT: 58.9 %
PDW BLD-RTO: 13.3 % (ref 12.4–15.4)
PLATELET # BLD: 328 K/UL (ref 135–450)
PMV BLD AUTO: 6.3 FL (ref 5–10.5)
POTASSIUM REFLEX MAGNESIUM: 4.4 MMOL/L (ref 3.5–5.1)
RBC # BLD: 4.76 M/UL (ref 4.2–5.9)
SODIUM BLD-SCNC: 139 MMOL/L (ref 136–145)
TOTAL PROTEIN: 7.1 G/DL (ref 6.4–8.2)
WBC # BLD: 8.8 K/UL (ref 4–11)

## 2022-07-08 PROCEDURE — 93005 ELECTROCARDIOGRAM TRACING: CPT | Performed by: STUDENT IN AN ORGANIZED HEALTH CARE EDUCATION/TRAINING PROGRAM

## 2022-07-08 PROCEDURE — 99284 EMERGENCY DEPT VISIT MOD MDM: CPT

## 2022-07-08 PROCEDURE — 96374 THER/PROPH/DIAG INJ IV PUSH: CPT

## 2022-07-08 PROCEDURE — 6360000002 HC RX W HCPCS: Performed by: STUDENT IN AN ORGANIZED HEALTH CARE EDUCATION/TRAINING PROGRAM

## 2022-07-08 PROCEDURE — 96375 TX/PRO/DX INJ NEW DRUG ADDON: CPT

## 2022-07-08 PROCEDURE — 2580000003 HC RX 258: Performed by: STUDENT IN AN ORGANIZED HEALTH CARE EDUCATION/TRAINING PROGRAM

## 2022-07-08 PROCEDURE — 2500000003 HC RX 250 WO HCPCS: Performed by: STUDENT IN AN ORGANIZED HEALTH CARE EDUCATION/TRAINING PROGRAM

## 2022-07-08 PROCEDURE — 80053 COMPREHEN METABOLIC PANEL: CPT

## 2022-07-08 PROCEDURE — 96372 THER/PROPH/DIAG INJ SC/IM: CPT

## 2022-07-08 PROCEDURE — 85025 COMPLETE CBC W/AUTO DIFF WBC: CPT

## 2022-07-08 RX ORDER — EPINEPHRINE 1 MG/ML
0.3 INJECTION, SOLUTION, CONCENTRATE INTRAVENOUS ONCE
Status: COMPLETED | OUTPATIENT
Start: 2022-07-08 | End: 2022-07-08

## 2022-07-08 RX ORDER — DIPHENHYDRAMINE HYDROCHLORIDE 50 MG/ML
25 INJECTION INTRAMUSCULAR; INTRAVENOUS ONCE
Status: COMPLETED | OUTPATIENT
Start: 2022-07-08 | End: 2022-07-08

## 2022-07-08 RX ORDER — 0.9 % SODIUM CHLORIDE 0.9 %
500 INTRAVENOUS SOLUTION INTRAVENOUS ONCE
Status: COMPLETED | OUTPATIENT
Start: 2022-07-08 | End: 2022-07-08

## 2022-07-08 RX ORDER — METHYLPREDNISOLONE SODIUM SUCCINATE 125 MG/2ML
125 INJECTION, POWDER, LYOPHILIZED, FOR SOLUTION INTRAMUSCULAR; INTRAVENOUS ONCE
Status: COMPLETED | OUTPATIENT
Start: 2022-07-08 | End: 2022-07-08

## 2022-07-08 RX ORDER — FAMOTIDINE 10 MG/ML
40 INJECTION, SOLUTION INTRAVENOUS ONCE
Status: COMPLETED | OUTPATIENT
Start: 2022-07-08 | End: 2022-07-08

## 2022-07-08 RX ORDER — TRANEXAMIC ACID 100 MG/ML
1000 INJECTION, SOLUTION INTRAVENOUS ONCE
Status: COMPLETED | OUTPATIENT
Start: 2022-07-08 | End: 2022-07-08

## 2022-07-08 RX ADMIN — TRANEXAMIC ACID 1000 MG: 1 INJECTION, SOLUTION INTRAVENOUS at 06:38

## 2022-07-08 RX ADMIN — EPINEPHRINE 0.3 MG: 1 INJECTION, SOLUTION, CONCENTRATE INTRAVENOUS at 06:35

## 2022-07-08 RX ADMIN — SODIUM CHLORIDE 500 ML: 9 INJECTION, SOLUTION INTRAVENOUS at 06:51

## 2022-07-08 RX ADMIN — DIPHENHYDRAMINE HYDROCHLORIDE 25 MG: 50 INJECTION, SOLUTION INTRAMUSCULAR; INTRAVENOUS at 06:20

## 2022-07-08 RX ADMIN — METHYLPREDNISOLONE SODIUM SUCCINATE 125 MG: 125 INJECTION, POWDER, FOR SOLUTION INTRAMUSCULAR; INTRAVENOUS at 06:31

## 2022-07-08 RX ADMIN — FAMOTIDINE 40 MG: 10 INJECTION, SOLUTION INTRAVENOUS at 06:22

## 2022-07-08 NOTE — CARE COORDINATION
CASE MANAGEMENT INITIAL ASSESSMENT      Reviewed chart and completed assessment with patient and wife  Family present: wife  Explained Case Management role/services. yes    Primary contact information:wife, [address] :   Primary Decision Maker: Luz Marina Issa - Spouse - [phone]          Can this person be reached and be able to respond quickly, such as within a few minutes or hours? Yes      Admit date/status:7/8/22  Maggie Zuniga   Is this a Readmission?:  No      Insurance:medicare   Precert required for SNF: No       3 night stay required: waived due to [address] arrangements, Adls, care needs, prior to admission:lives at home with wife, independent in ADLs, drives    Durable Medical Equipment at home:  Walker__Cane__RTS__ BSC__Shower Chair__  02__ HHN__ CPAP__  BiPap__  Hospital Bed__ W/C___ Other_____    Services in the home and/or outpatient, prior to admission:none    Current PCP:Dr. Hassan Cocolalla                                Medications:yes Prescription coverage? Yes Will pt require financial assistance with medications No     Transportation needs: car     Dialysis Facility (if applicable)   · Name:  · Address:  · Dialysis Schedule:  · Phone:  · Fax:    PT/OT recs:    Hospital Exemption Notification (HEN):    Barriers to discharge:medical complications    Plan/comments:Referred to patient for d/c planning. Spoke to patient and wife. Patient is a 76year old male admitted for angioedema. Patient usually lives at home with wife. Patient reports he is independent in ADLs. Patient denies d/c needs at this time.   Electronically signed by CASI Carrillo on 7/8/2022 at 9:15 AM      [facility] on chart for MD signature

## 2022-07-08 NOTE — ACP (ADVANCE CARE PLANNING)
Advance Care Planning     General Advance Care Planning (ACP) Conversation    Date of Conversation: 7/8/2022  Conducted with: Patient with Decision Making Capacity    Healthcare Decision Maker:  No healthcare decision makers have been documented. Click here to complete [address] including selection of the Healthcare Decision Maker Relationship (ie \"Primary\")   Today we documented Decision Maker(s) consistent with Legal Next of Kin hierarchy.       Length of Voluntary ACP Conversation in minutes:  <16 minutes (Non-Billable)    MILLY Sharpe

## 2022-07-08 NOTE — ED PROVIDER NOTES
[facility]  ED      CHIEF COMPLAINT  Angioedema       HISTORY OF PRESENT ILLNESS  Alfonzo Mas is a 76 y.o. male with a past medical history of angioedema, hyper, coronary artery disease, who presents to the ED complaining of angioedema    Onset: midnight  Reports left sided swelling of tongue, does report mild muffled voice and mild difficulty swallowing   Medications taken prior to arrival: denies  Patient denies shortness of breath    Felt previously related to losartan- last taken 1m ago. Patient's allergist, Dr Chrissie Marcano said episodes could occur up to one year    Urticaria: denies  Throat swelling: denies, feels it is just his tongue  Nausea/vomiting/diarrhea: denies  Any known allergies: Allergies   Allergen Reactions    Atorvastatin      nausea    Lisinopril Other (See Comments)     cough     Previous history of anaphylaxis: has been treated with epinephrine in the past    Possible exposures:   New detergents or soaps: denies  New medications: denies  No exposure to food: denies  Other: denies    Old records reviewed: seen 6/13 For angioedema of the right side of the tongue. Patient was treated with epinephrine, steroids, Benadryl, and Pepcid with resolution of symptoms. He continues to follow with an allergist.    No other complaints, modifying factors or associated symptoms. I have reviewed the following from the nursing documentation.     Past Medical History:   Diagnosis Date    Arthritis     joints    CAD (coronary artery disease) 2008    stents x2    Hyperlipidemia     Hypertension     Panic anxiety syndrome      Past Surgical History:   Procedure Laterality Date    APPENDECTOMY      CHOLECYSTECTOMY      COLONOSCOPY  12/16/10    COLONOSCOPY  2/24/2015    Poylps    HERNIA REPAIR      bilateral inguinal    UPPER GASTROINTESTINAL ENDOSCOPY N/A 4/8/2021    EGD BIOPSY performed by Casey Samano MD at [address] [place] [address]     Family History   Problem Relation Age of Onset    facility-administered medications for this encounter. Current Outpatient Medications   Medication Sig Dispense Refill    famotidine (PEPCID) 20 MG tablet Take 1 tablet by mouth 2 times daily for 5 days 10 tablet 0    losartan (COZAAR) 50 MG tablet Take 1 tablet by mouth daily 30 tablet 3    metoprolol succinate (TOPROL XL) 25 MG extended release tablet Take 1 tablet by mouth 2 times daily 30 tablet 3    pantoprazole (PROTONIX) 40 MG tablet Take 1 tablet by mouth every morning (before breakfast) 30 tablet 3    rosuvastatin (CRESTOR) 10 MG tablet Take 20 mg by mouth daily.  aspirin 325 MG tablet Take 325 mg by mouth daily.  doxepin (SINEQUAN) 100 MG capsule Take 75 mg by mouth nightly.  fluticasone (FLONASE) 50 MCG/ACT nasal spray 2 sprays by Nasal route daily. Allergies   Allergen Reactions    Atorvastatin      nausea    Lisinopril Other (See Comments)     cough       REVIEW OF SYSTEMS  All systems reviewed, pertinent positives per HPI otherwise noted to be negative. PHYSICAL EXAM  BP (!) 149/81   Pulse 63   Temp 98 °F (36.7 °C) (Oral)   Resp 14   Ht 5' 6\" (1.676 m)   Wt 174 lb (78.9 kg)   SpO2 96%   BMI 28.08 kg/m²    GENERAL APPEARANCE: Awake and alert. Cooperative. no distress. HENT: Normocephalic. Atraumatic. Mucous membranes are moist.  Left tongue swelling. No lip or posterior oropharyngeal swelling. Mallampati score of 2. Patient is tolerating secretions  NECK: Supple. Full range of motion of the neck without stiffness or pain. No stridor. EYES: PERRL. EOM's grossly intact. HEART/CHEST: RRR. No murmurs. Chest wall is not tender to palpation. LUNGS: Respirations unlabored. CTAB. Good air exchange. Speaking comfortably in full sentences. no wheezing. ABDOMEN: No tenderness. Soft. Non-distended. No masses. No organomegaly. No guarding or rebound. MUSCULOSKELETAL: No extremity edema. Compartments soft. No deformity. No tenderness in the extremities.   All extremities neurovascularly intact. SKIN: Warm and dry. No rashes. NEUROLOGICAL: Alert and oriented. No gross facial drooping. Strength 5/5, sensation intact. PSYCHIATRIC: Normal mood and affect. LABS  I have reviewed all labs for this visit. Results for orders placed or performed during the hospital encounter of 07/08/22   CBC with Auto Differential   Result Value Ref Range    WBC 8.8 4.0 - 11.0 K/uL    RBC 4.76 4.20 - 5.90 M/uL    Hemoglobin 15.4 13.5 - 17.5 g/dL    Hematocrit 45.9 40.5 - 52.5 %    MCV 96.4 80.0 - 100.0 fL    MCH 32.4 26.0 - 34.0 pg    MCHC 33.6 31.0 - 36.0 g/dL    RDW 13.3 12.4 - 15.4 %    Platelets 015 398 - 692 K/uL    MPV 6.3 5.0 - 10.5 fL    Neutrophils % 58.9 %    Lymphocytes % 29.9 %    Monocytes % 8.7 %    Eosinophils % 2.2 %    Basophils % 0.3 %    Neutrophils Absolute 5.2 1.7 - 7.7 K/uL    Lymphocytes Absolute 2.6 1.0 - 5.1 K/uL    Monocytes Absolute 0.8 0.0 - 1.3 K/uL    Eosinophils Absolute 0.2 0.0 - 0.6 K/uL    Basophils Absolute 0.0 0.0 - 0.2 K/uL   Comprehensive Metabolic Panel w/ Reflex to MG   Result Value Ref Range    Sodium 139 136 - 145 mmol/L    Potassium reflex Magnesium 4.4 3.5 - 5.1 mmol/L    Chloride 102 99 - 110 mmol/L    CO2 27 21 - 32 mmol/L    Anion Gap 10 3 - 16    Glucose 102 (H) 70 - 99 mg/dL    BUN 24 (H) 7 - 20 mg/dL    CREATININE 1.2 0.8 - 1.3 mg/dL    GFR Non-African American 58 (A) >60    GFR African American >60 >60    Calcium 9.8 8.3 - 10.6 mg/dL    Total Protein 7.1 6.4 - 8.2 g/dL    Albumin 4.2 3.4 - 5.0 g/dL    Albumin/Globulin Ratio 1.4 1.1 - 2.2    Total Bilirubin 0.5 0.0 - 1.0 mg/dL    Alkaline Phosphatase 79 40 - 129 U/L    ALT 16 10 - 40 U/L    AST 20 15 - 37 U/L       ECG  The Ekg interpreted by me shows  sinus bradycardia, rate=55  Axis is   Normal  QTc is  within an acceptable range  Intervals and Durations are unremarkable.       ST Segments: no acute change  No significant change from prior EKG dated 6/13/22    RADIOLOGY    No orders to display          ED COURSE / MDM  Patient seen and evaluated. Old records reviewed and pertinent information included in HPI. Labs and imaging reviewed and results discussed with patient. Overall patient presenting for angioedema. He had a similar episode previously, related to previous ACE inhibitor/ARB exposure. Physical exam remarkable for left-sided tongue swelling. No swelling of the posterior oropharynx. There is a change in the patient's voice, however no evidence of airway compromise. Differential diagnosis includes but is not limited to: Anaphylaxis, Angioedema, Allergic dermatitis, Bacterial etiology, Fungal etiology, Urticaria, Erythema Multiforme, Anxiety, Cardiac arrhythmia, other     During the patient's ED course, the patient was given:  Medications   diphenhydrAMINE (BENADRYL) injection 25 mg (25 mg IntraVENous Given 7/8/22 0620)   methylPREDNISolone sodium (SOLU-MEDROL) injection 125 mg (125 mg IntraVENous Given 7/8/22 0631)   famotidine (PEPCID) injection 40 mg (40 mg IntraVENous Given 7/8/22 0622)   EPINEPHrine PF 1 MG/ML injection 0.3 mg (0.3 mg IntraMUSCular Given 7/8/22 0635)   tranexamic acid (CYKLOKAPRON) injection 1,000 mg (1,000 mg IntraVENous Given 7/8/22 0638)   0.9 % sodium chloride bolus (0 mLs IntraVENous Stopped 7/8/22 0846)      On arrival, patient has angioedema. He is currently protecting his airway. Patient did take some Benadryl overnight. Patient has had multiple previous episodes, felt related to medication. Patient given steroids, epinephrine, Pepcid, further Benadryl, and TXA as well as fluids. Few months outside revealing temperature was myself    Workup showed:  ED Course as of 07/11/22 1934 Fri Jul 08, 2022   0961 Patient reassessed, he is receiving medications. He does not feel that symptoms have worsened at this time. Airway still intact.  [ER]   9917 The Ekg interpreted by me shows  sinus bradycardia, rate=55  Axis is   Normal  QTc is  within an acceptable range  Intervals and Durations are unremarkable. ST Segments: no acute change  No significant change from prior EKG dated 6/13/22 [ER]   0637 No leukocytosis, anemia, thrombocytopenia [ER]   0637 No electrolyte abnormalities or evidence of significant kidney dysfunction [ER]   9309 Liver function testing unremarkable [ER]   0818 Patient feels that symptoms are improving. On reevaluation of the throat, do feel that tongue is slightly improved compared to previous. Mallampati of 1. Voice also sounds improved. We will continue to monitor given continued tongue swelling [ER]   0957 On reassessment, patient reports significant improvement. Voice sounds normal at this time. On evaluation of the oropharynx, tongue swelling appears to be resolved. Mallampati class I. [ER]   1146 Patient states he feels that he is back at baseline. Plan was to discussed with the patient our goal he plan to discharge home. Was initially agreeable to this plan but then decided to leave by her to call back from allergist.    Spoke to Dr Giuliana Nelson after patient had left the ED. He agreed with plan followed and will call the patient. [ER]      ED Course User Index  [ER] Bandar Cortez MD       This is a patient with an angioedema without significant evidence of anaphylaxis, shock, toxicity, upper airway compromise, lower airway compromise, hemodynamic or cardiopulmonary instability, or any disease process requiring other immediate surgical or medical intervention at this time. Angioedema responded to treatment in the emergency department. On physical exam patient is without respiratory distress, no respiratory wheezing, no auditory stridor, no oropharynx obstruction. Work-up overall reassuring. At this time, feel the patient is appropriate for discharge to follow-up with a primary care doctor. Patient feels comfortable with discharge at this time. Patient counseled to continue his previous allergy medications. Return precautions given. Encouraged PCP follow-up in 1 day, encouraged him to call his allergist today. Spoke to patient's allergist who stated he would call the patient. Patient discharged in stable condition. I spent a total of 30 minutes of critical care time in obtaining history, performing a physical exam, bedside monitoring of interventions, collecting and interpreting tests and discussion with consultants but not including time spent performing procedures. Clinical Concern angioedema    Intervention epinephrine, Pepcid, Benadryl, TXA, consult with allergy, frequent reassessment        CLINICAL IMPRESSION  1. Angioedema, initial encounter        Blood pressure 117/76, pulse 64, temperature 98 °F (36.7 °C), temperature source Oral, resp. rate 18, height 5' 6\" (1.676 m), weight 174 lb (78.9 kg), SpO2 96 %. DISPOSITION  Leandro Lind was discharged to home in stable condition. Patient was given scripts for the following medications. I counseled patient how to take these medications. Discharge Medication List as of 7/8/2022 11:25 AM          Follow-up with:  Alisson Anguiano MD  [address] [address] 27     Schedule an appointment as soon as possible for a visit on 7/11/2022      Abigail Cobb MD  [address] [facility] 27 [phone]    Call today      [facility]  ED  [facility] [address] [phone]  Go to   As needed, If symptoms worsen      DISCLAIMER: This chart was created using Dragon dictation software. Efforts were made by me to ensure accuracy, however some errors may be present due to limitations of this technology and occasionally words are not transcribed correctly.           Vickie Mccarthy MD  07/11/22 1456

## 2022-07-09 LAB
EKG ATRIAL RATE: 55 BPM
EKG DIAGNOSIS: NORMAL
EKG P AXIS: -12 DEGREES
EKG P-R INTERVAL: 170 MS
EKG Q-T INTERVAL: 430 MS
EKG QRS DURATION: 102 MS
EKG QTC CALCULATION (BAZETT): 411 MS
EKG R AXIS: 4 DEGREES
EKG T AXIS: 57 DEGREES
EKG VENTRICULAR RATE: 55 BPM

## 2022-07-09 PROCEDURE — 93010 ELECTROCARDIOGRAM REPORT: CPT | Performed by: INTERNAL MEDICINE

## 2022-07-25 ENCOUNTER — HOSPITAL ENCOUNTER (EMERGENCY)
Age: 77
Discharge: HOME | End: 2022-07-25
Attending: EMERGENCY MEDICINE
Payer: MEDICARE

## 2022-07-25 VITALS
TEMPERATURE: 97.9 F | WEIGHT: 174 LBS | OXYGEN SATURATION: 93 % | RESPIRATION RATE: 16 BRPM | SYSTOLIC BLOOD PRESSURE: 110 MMHG | BODY MASS INDEX: 28.08 KG/M2 | HEART RATE: 70 BPM | DIASTOLIC BLOOD PRESSURE: 72 MMHG

## 2022-07-25 DIAGNOSIS — T78.3XXA ANGIOEDEMA, INITIAL ENCOUNTER: Primary | ICD-10-CM

## 2022-07-25 DIAGNOSIS — T46.5X5A: ICD-10-CM

## 2022-07-25 LAB
ANION GAP SERPL CALCULATED.3IONS-SCNC: 10 MMOL/L (ref 3–16)
BASOPHILS ABSOLUTE: 0.1 K/UL (ref 0–0.2)
BASOPHILS RELATIVE PERCENT: 0.6 %
BUN BLDV-MCNC: 30 MG/DL (ref 7–20)
CALCIUM SERPL-MCNC: 9.6 MG/DL (ref 8.3–10.6)
CHLORIDE BLD-SCNC: 101 MMOL/L (ref 99–110)
CO2: 27 MMOL/L (ref 21–32)
CREAT SERPL-MCNC: 1.1 MG/DL (ref 0.8–1.3)
EOSINOPHILS ABSOLUTE: 0.2 K/UL (ref 0–0.6)
EOSINOPHILS RELATIVE PERCENT: 2 %
GFR AFRICAN AMERICAN: >60
GFR NON-AFRICAN AMERICAN: >60
GLUCOSE BLD-MCNC: 111 MG/DL (ref 70–99)
HCT VFR BLD CALC: 46.1 % (ref 40.5–52.5)
HEMOGLOBIN: 15.5 G/DL (ref 13.5–17.5)
LYMPHOCYTES ABSOLUTE: 3 K/UL (ref 1–5.1)
LYMPHOCYTES RELATIVE PERCENT: 35.9 %
MCH RBC QN AUTO: 32.7 PG (ref 26–34)
MCHC RBC AUTO-ENTMCNC: 33.7 G/DL (ref 31–36)
MCV RBC AUTO: 96.9 FL (ref 80–100)
MONOCYTES ABSOLUTE: 0.7 K/UL (ref 0–1.3)
MONOCYTES RELATIVE PERCENT: 8.8 %
NEUTROPHILS ABSOLUTE: 4.4 K/UL (ref 1.7–7.7)
NEUTROPHILS RELATIVE PERCENT: 52.7 %
PDW BLD-RTO: 13.2 % (ref 12.4–15.4)
PLATELET # BLD: 325 K/UL (ref 135–450)
PMV BLD AUTO: 6.7 FL (ref 5–10.5)
POTASSIUM SERPL-SCNC: 5.6 MMOL/L (ref 3.5–5.1)
RBC # BLD: 4.76 M/UL (ref 4.2–5.9)
SODIUM BLD-SCNC: 138 MMOL/L (ref 136–145)
WBC # BLD: 8.3 K/UL (ref 4–11)

## 2022-07-25 PROCEDURE — 83520 IMMUNOASSAY QUANT NOS NONAB: CPT

## 2022-07-25 PROCEDURE — 85025 COMPLETE CBC W/AUTO DIFF WBC: CPT

## 2022-07-25 PROCEDURE — 96374 THER/PROPH/DIAG INJ IV PUSH: CPT

## 2022-07-25 PROCEDURE — 2500000003 HC RX 250 WO HCPCS: Performed by: EMERGENCY MEDICINE

## 2022-07-25 PROCEDURE — 6360000002 HC RX W HCPCS: Performed by: EMERGENCY MEDICINE

## 2022-07-25 PROCEDURE — 99284 EMERGENCY DEPT VISIT MOD MDM: CPT

## 2022-07-25 PROCEDURE — 80048 BASIC METABOLIC PNL TOTAL CA: CPT

## 2022-07-25 PROCEDURE — 96372 THER/PROPH/DIAG INJ SC/IM: CPT

## 2022-07-25 RX ORDER — FAMOTIDINE 10 MG/ML
20 INJECTION, SOLUTION INTRAVENOUS ONCE
Status: COMPLETED | OUTPATIENT
Start: 2022-07-25 | End: 2022-07-25

## 2022-07-25 RX ORDER — DIPHENHYDRAMINE HYDROCHLORIDE 50 MG/ML
50 INJECTION INTRAMUSCULAR; INTRAVENOUS ONCE
Status: COMPLETED | OUTPATIENT
Start: 2022-07-25 | End: 2022-07-25

## 2022-07-25 RX ORDER — DIPHENHYDRAMINE HYDROCHLORIDE 50 MG/ML
25 INJECTION INTRAMUSCULAR; INTRAVENOUS ONCE
Status: COMPLETED | OUTPATIENT
Start: 2022-07-25 | End: 2022-07-25

## 2022-07-25 RX ORDER — EPINEPHRINE 1 MG/ML
0.3 INJECTION, SOLUTION, CONCENTRATE INTRAVENOUS ONCE
Status: COMPLETED | OUTPATIENT
Start: 2022-07-25 | End: 2022-07-25

## 2022-07-25 RX ORDER — METHYLPREDNISOLONE SODIUM SUCCINATE 125 MG/2ML
125 INJECTION, POWDER, LYOPHILIZED, FOR SOLUTION INTRAMUSCULAR; INTRAVENOUS ONCE
Status: COMPLETED | OUTPATIENT
Start: 2022-07-25 | End: 2022-07-25

## 2022-07-25 RX ADMIN — EPINEPHRINE 0.3 MG: 1 INJECTION, SOLUTION, CONCENTRATE INTRAVENOUS at 06:16

## 2022-07-25 RX ADMIN — DIPHENHYDRAMINE HYDROCHLORIDE 25 MG: 50 INJECTION, SOLUTION INTRAMUSCULAR; INTRAVENOUS at 10:26

## 2022-07-25 RX ADMIN — METHYLPREDNISOLONE SODIUM SUCCINATE 125 MG: 125 INJECTION, POWDER, FOR SOLUTION INTRAMUSCULAR; INTRAVENOUS at 06:16

## 2022-07-25 RX ADMIN — DIPHENHYDRAMINE HYDROCHLORIDE 50 MG: 50 INJECTION, SOLUTION INTRAMUSCULAR; INTRAVENOUS at 06:29

## 2022-07-25 RX ADMIN — FAMOTIDINE 20 MG: 10 INJECTION, SOLUTION INTRAVENOUS at 06:16

## 2022-07-25 ASSESSMENT — ENCOUNTER SYMPTOMS
COUGH: 0
VOMITING: 0
DIARRHEA: 0
EYE REDNESS: 0
RHINORRHEA: 0
CHEST TIGHTNESS: 0
BACK PAIN: 0
ABDOMINAL PAIN: 0
EYE DISCHARGE: 0
SHORTNESS OF BREATH: 0
SORE THROAT: 1
FACIAL SWELLING: 1

## 2022-07-25 ASSESSMENT — PAIN - FUNCTIONAL ASSESSMENT: PAIN_FUNCTIONAL_ASSESSMENT: NONE - DENIES PAIN

## 2022-07-25 NOTE — ED PROVIDER NOTES
Emergency Department Provider Note  Location: [facility]  ED  7/25/2022     Patient Identification  Karen Mazariegos is a 76 y.o. male    Chief Complaint  Allergic Reaction (Patient c/o ongoing allergic reaction to Losartan, states seen in ED beginning of July for same. C/o oral swelling starting around 5:30am. ) and Oral Swelling    HPI  (History provided by patient and spouse/SO)    Patient is a 76 y.o. male with a significant PMHx of CAD, hyperlipidemia, and hypertension, that presents the emergency department today with his typical allergic reaction to suspected losartan. Patient says he noticed tongue swelling when he woke up this morning, must of started overnight. Says his tongue is very swollen and he is having some throat tightness when he lays flat, but is talking in full sentences, not hypoxic, and does not have any lip swelling. Takes Zyrtec twice daily, did not take any yet today. Did not take any medication otherwise prior to arrival.  Denies any hives, vomiting, itchiness, or any other concerns including chest pain, shortness of breath, headaches, vision changes. Patient says every time he comes to the ER, he gets the same medications and they work. He does not know if he is ever received FFP or TXA. On chart review, patient had presented to this hospital back in June, and ED provider at that time discussed patient's angioedema versus allergic reaction to suspected losartan; \"Spoke with Dr. Frederick Salazar with immunology and he reports tell the patient to take Zyrtec twice daily along with Pepcid twice daily but no need for steroids at this time and have him follow-up in the office in 3 days as scheduled and call sooner for any other concerns to the office. \"  States at that time was 4.0. And treated with steroids, benadryl, IM epinephrine, and episode and observed in the emergency department until resolution.     I have reviewed the following nursing documentation:  Allergies: Allergies   Allergen Reactions    Losartan Anaphylaxis    Atorvastatin      nausea    Lisinopril Other (See Comments)     cough    Prednisone Other (See Comments)     Insomnia, hyper, \"skin crawling\"       Past medical history:  has a past medical history of Arthritis, CAD (coronary artery disease) (2008), Hyperlipidemia, Hypertension, and Panic anxiety syndrome. Past surgical history:  has a past surgical history that includes Hernia repair; Cholecystectomy; Appendectomy; Colonoscopy (12/16/10); Colonoscopy (2/24/2015); and Upper gastrointestinal endoscopy (N/A, 4/8/2021). Home medications:   Prior to Admission medications    Medication Sig Start Date End Date Taking? Authorizing Provider   famotidine (PEPCID) 20 MG tablet Take 1 tablet by mouth 2 times daily for 5 days 6/13/22 6/18/22  Lita Pace MD   losartan (COZAAR) 50 MG tablet Take 1 tablet by mouth daily 4/9/21   NATE Rg CNP   metoprolol succinate (TOPROL XL) 25 MG extended release tablet Take 1 tablet by mouth 2 times daily 4/8/21   NATE Rg CNP   pantoprazole (PROTONIX) 40 MG tablet Take 1 tablet by mouth every morning (before breakfast) 4/9/21   Prema LawNATE Baer CNP   rosuvastatin (CRESTOR) 10 MG tablet Take 20 mg by mouth daily. Historical Provider, MD   aspirin 325 MG tablet Take 325 mg by mouth daily. Historical Provider, MD   doxepin (SINEQUAN) 100 MG capsule Take 75 mg by mouth nightly. Historical Provider, MD   fluticasone (FLONASE) 50 MCG/ACT nasal spray 2 sprays by Nasal route daily. Historical Provider, MD       Social history:  reports that he quit smoking about 36 years ago. He has never used smokeless tobacco. He reports that he does not drink alcohol and does not use drugs.     Family history:    Family History   Problem Relation Age of Onset    Heart Disease Father          ROS  Review of Systems   Constitutional:  Negative for activity change, appetite change, fatigue and fever. HENT:  Positive for facial swelling (tongue) and sore throat. Negative for congestion and rhinorrhea. Eyes:  Negative for discharge and redness. Respiratory:  Negative for cough, chest tightness and shortness of breath. Cardiovascular:  Negative for chest pain and leg swelling. Gastrointestinal:  Negative for abdominal pain, diarrhea and vomiting. Genitourinary:  Negative for difficulty urinating and dysuria. Musculoskeletal:  Negative for back pain and neck pain. Skin:  Negative for rash and wound. Neurological:  Negative for dizziness, weakness, light-headedness and numbness. Exam  Vitals:    07/25/22 0715 07/25/22 0843 07/25/22 1027 07/25/22 1132   BP: 110/68 108/62 111/69 110/72   Pulse: 84 65 64 70   Resp: 15 17 14 16   Temp:       TempSrc:       SpO2: 93% 92% 95% 93%   Weight:             Physical Exam  Constitutional:       Appearance: Normal appearance. He is normal weight. He is not ill-appearing or diaphoretic. HENT:      Head: Normocephalic and atraumatic. Right Ear: External ear normal.      Left Ear: External ear normal.      Nose: Nose normal.      Mouth/Throat:      Mouth: Mucous membranes are moist.      Pharynx: Oropharynx is clear. Comments: R>L tongue swelling. Lips normal. Posterior oropharynx clear. Eyes:      General:         Right eye: No discharge. Left eye: No discharge. Conjunctiva/sclera: Conjunctivae normal.   Cardiovascular:      Rate and Rhythm: Normal rate and regular rhythm. Pulmonary:      Effort: Pulmonary effort is normal. No respiratory distress. Breath sounds: Normal breath sounds. Abdominal:      General: Abdomen is flat. Palpations: Abdomen is soft. Musculoskeletal:         General: No swelling or tenderness. Cervical back: Normal range of motion and neck supple. Skin:     General: Skin is warm and dry. Findings: No rash. Neurological:      General: No focal deficit present. Mental Status: He is alert and oriented to person, place, and time. Psychiatric:         Mood and Affect: Mood normal.         Thought Content: Thought content normal.       ED Course    ED Medication Orders (From admission, onward)      Start Ordered     Status Ordering Provider    07/25/22 1000 07/25/22 0954  diphenhydrAMINE (BENADRYL) injection 25 mg  ONCE         Last MAR action: Given - by Delpha Roll on 07/25/22 at 1026 SOFIA DE LA CRUZ    07/25/22 0630 07/25/22 0619  diphenhydrAMINE (BENADRYL) injection 50 mg  ONCE         Last MAR action: Given - by Delpha Roll on 07/25/22 at 0629 SOFIA DE LA CRUZ    07/25/22 0615 07/25/22 0612  EPINEPHrine PF 1 MG/ML injection 0.3 mg  ONCE         Last MAR action: Given - by Delpha Roll on 07/25/22 at 0616 SOFIA DE LA CRUZ    07/25/22 0615 07/25/22 0612  famotidine (PEPCID) injection 20 mg  ONCE         Last MAR action: Given - by Delpha Roll on 07/25/22 at 0616 SOFIA DE LA CRUZ    07/25/22 0615 07/25/22 0612  methylPREDNISolone sodium (SOLU-MEDROL) injection 125 mg  ONCE         Last MAR action: Given - by Delpha Roll on 07/25/22 at 0616 [facility]            Radiology  No results found.     Labs  Results for orders placed or performed during the hospital encounter of 07/25/22   CBC with Auto Differential   Result Value Ref Range    WBC 8.3 4.0 - 11.0 K/uL    RBC 4.76 4.20 - 5.90 M/uL    Hemoglobin 15.5 13.5 - 17.5 g/dL    Hematocrit 46.1 40.5 - 52.5 %    MCV 96.9 80.0 - 100.0 fL    MCH 32.7 26.0 - 34.0 pg    MCHC 33.7 31.0 - 36.0 g/dL    RDW 13.2 12.4 - 15.4 %    Platelets 503 643 - 562 K/uL    MPV 6.7 5.0 - 10.5 fL    Neutrophils % 52.7 %    Lymphocytes % 35.9 %    Monocytes % 8.8 %    Eosinophils % 2.0 %    Basophils % 0.6 %    Neutrophils Absolute 4.4 1.7 - 7.7 K/uL    Lymphocytes Absolute 3.0 1.0 - 5.1 K/uL    Monocytes Absolute 0.7 0.0 - 1.3 K/uL    Eosinophils Absolute 0.2 0.0 - 0.6 K/uL    Basophils Absolute 0.1 0.0 - 0.2 K/uL   Basic Metabolic Panel Result Value Ref Range    Sodium 138 136 - 145 mmol/L    Potassium 5.6 (H) 3.5 - 5.1 mmol/L    Chloride 101 99 - 110 mmol/L    CO2 27 21 - 32 mmol/L    Anion Gap 10 3 - 16    Glucose 111 (H) 70 - 99 mg/dL    BUN 30 (H) 7 - 20 mg/dL    Creatinine 1.1 0.8 - 1.3 mg/dL    GFR Non-African American >60 >60    GFR African American >60 >60    Calcium 9.6 8.3 - 10.6 mg/dL         Procedures  Procedures    MDM  Patient seen and evaluated. Relevant records reviewed. - Patient is a 76 y.o. male with history of recurrent tongue swelling with a suspected allergy to losartan. Tryptase last encounter in June was 4.0. Treated for allergic reaction at that time with resolution of symptoms. Please see above. Arrives emergency department stable vital signs, with tongue swelling, and pain in full sentences. - Exam showed tongue swelling right greater than left, airway intact. No oropharynx swelling. No rashes, no vomiting. Today in the emergency department;  -Tryptase pending in the setting of vesication that seems to be ongoing outpatient for angioedema versus allergic reaction to losartan    Otherwise, unremarkable CBC and CBC. Over the course of his ED stay, patient symptomatology improved after initial dose of IV Benadryl, IM epinephrine, Pepcid, and Solu-Medrol as below. Airway remains intact, very comfortable today in the emergency department. 4:21 PM  At about 3-1/2 hours of observation time, patient noted that his tongue was still slightly swollen, and wanted another dose of Benadryl before he is discharged home. I am awaiting a call from allergy/immunology, patient sees Dr. Jun Elder. - Patient treated today for an allergic reaction as previous encounters.       Medications   EPINEPHrine PF 1 MG/ML injection 0.3 mg (0.3 mg IntraMUSCular Given 7/25/22 0616)   famotidine (PEPCID) injection 20 mg (20 mg IntraVENous Given 7/25/22 0616)   methylPREDNISolone sodium (SOLU-MEDROL) injection 125 mg (125 mg IntraVENous Given 7/25/22 0616)   diphenhydrAMINE (BENADRYL) injection 50 mg (50 mg IntraVENous Given 7/25/22 0629)   diphenhydrAMINE (BENADRYL) injection 25 mg (25 mg IntraVENous Given 7/25/22 1026)     -After observation patient swelling has resolved the emergency department. 4:21 PM  Discussed patient case with Dr. 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 Patient has received FFP in the past, per Dr. Jocelyn Napoles, and this has been the most helpful, that it will resolve continuously over the course of the day. He states that he will follow-up with patient soon as possible, will have his office call him today. -After consultation with allergy and immunology, discussion with patient, shared decision making, discharge home in stable condition with significant improvement of symptomatology. Follow-up with allergy as soon as possible, follow-up with PCP as well. Patient/family agreeable to plan and express understanding of plan. Clinical Impression:  1. Angioedema, initial encounter    2. Adverse effect of losartan, initial encounter      Disposition:  Discharge to home in stable condition. Blood pressure 110/72, pulse 70, temperature 97.9 °F (36.6 °C), temperature source Oral, resp. rate 16, weight 174 lb (78.9 kg), SpO2 93 %. Patient was given prescriptions for the following medications. I counseled patient how to take these medications.    Discharge Medication List as of 7/25/2022 11:16 AM          Disposition referral (if applicable):  Elin Katz MD  [facility] [phone]    Schedule an appointment as soon as possible for a visit       [facility]  ED  [facility]  [address]  [phone]    If symptoms worsen, As needed    Dr Jocelyn Napoles  Will call you-- allergy/immunoogy  Schedule an appointment as soon as possible for a visit       I, Master Prieto DO (Andriy Lazaro DO, bernardo), am the primary attending of record and contributed the majority of evaluation and treatment of emergent care for this encounter. This chart was generated in part by using Dragon Dictation system and may contain errors related to that system including errors in grammar, punctuation, and spelling, as well as words and phrases that may be inappropriate. If there are any questions or concerns please feel free to contact the dictating provider for clarification.      SOFIA DE LA CRUZ,   [address], DO  07/25/22 7055

## 2022-07-25 NOTE — ED NOTES
Patient states he feels as if tongue swelling is decreasing, patient states he feels like he is able to breathe better at this time.       Martina Herrmann RN  07/25/22 4547

## 2022-07-25 NOTE — ED NOTES
1003: PS Dr. Vincent Sosa  RE: losartan allergy  PER: Dr. Amy Duong    [id number]: no response via PS.  230 Ajith Flores and Dr. Amy Duong spoke with Dr. Greyson Atwood  07/25/22 0406

## 2022-07-25 NOTE — ED NOTES
Patient discharged with all belongings and discharge paperwork. Patient verbalized understanding of discharge instructions and follow up with allergist. Patient ambulatory out of the ED with wife.      Violeta Neumann RN  07/25/22 5229

## 2022-07-27 LAB — TRYPTASE: 5.6 UG/L

## 2022-08-10 ENCOUNTER — APPOINTMENT (OUTPATIENT)
Dept: GENERAL RADIOLOGY | Age: 77
DRG: 640 | End: 2022-08-10
Payer: MEDICARE

## 2022-08-10 ENCOUNTER — APPOINTMENT (OUTPATIENT)
Dept: CT IMAGING | Age: 77
DRG: 640 | End: 2022-08-10
Payer: MEDICARE

## 2022-08-10 ENCOUNTER — HOSPITAL ENCOUNTER (INPATIENT)
Age: 77
LOS: 1 days | Discharge: HOME HEALTH | DRG: 640 | End: 2022-08-11
Attending: EMERGENCY MEDICINE | Admitting: INTERNAL MEDICINE
Payer: MEDICARE

## 2022-08-10 DIAGNOSIS — S82.891A CLOSED FRACTURE OF RIGHT ANKLE, INITIAL ENCOUNTER: ICD-10-CM

## 2022-08-10 DIAGNOSIS — R55 SYNCOPE AND COLLAPSE: ICD-10-CM

## 2022-08-10 DIAGNOSIS — I95.1 ORTHOSTATIC HYPOTENSION: ICD-10-CM

## 2022-08-10 DIAGNOSIS — U07.1 COVID-19: Primary | ICD-10-CM

## 2022-08-10 DIAGNOSIS — S09.90XA CLOSED HEAD INJURY, INITIAL ENCOUNTER: ICD-10-CM

## 2022-08-10 LAB
A/G RATIO: 1.5 (ref 1.1–2.2)
ALBUMIN SERPL-MCNC: 4 G/DL (ref 3.4–5)
ALP BLD-CCNC: 83 U/L (ref 40–129)
ALT SERPL-CCNC: 14 U/L (ref 10–40)
ANION GAP SERPL CALCULATED.3IONS-SCNC: 10 MMOL/L (ref 3–16)
AST SERPL-CCNC: 18 U/L (ref 15–37)
BASOPHILS ABSOLUTE: 0 K/UL (ref 0–0.2)
BASOPHILS RELATIVE PERCENT: 0.5 %
BILIRUB SERPL-MCNC: 0.5 MG/DL (ref 0–1)
BUN BLDV-MCNC: 18 MG/DL (ref 7–20)
CALCIUM SERPL-MCNC: 8.3 MG/DL (ref 8.3–10.6)
CHLORIDE BLD-SCNC: 102 MMOL/L (ref 99–110)
CO2: 23 MMOL/L (ref 21–32)
CREAT SERPL-MCNC: 1.1 MG/DL (ref 0.8–1.3)
EKG ATRIAL RATE: 63 BPM
EKG DIAGNOSIS: NORMAL
EKG P AXIS: 6 DEGREES
EKG P-R INTERVAL: 158 MS
EKG Q-T INTERVAL: 410 MS
EKG QRS DURATION: 102 MS
EKG QTC CALCULATION (BAZETT): 419 MS
EKG R AXIS: 14 DEGREES
EKG T AXIS: 36 DEGREES
EKG VENTRICULAR RATE: 63 BPM
EOSINOPHILS ABSOLUTE: 0.1 K/UL (ref 0–0.6)
EOSINOPHILS RELATIVE PERCENT: 0.7 %
GFR AFRICAN AMERICAN: >60
GFR NON-AFRICAN AMERICAN: >60
GLUCOSE BLD-MCNC: 120 MG/DL (ref 70–99)
GLUCOSE BLD-MCNC: 129 MG/DL (ref 70–99)
HCT VFR BLD CALC: 39.9 % (ref 40.5–52.5)
HEMOGLOBIN: 13.2 G/DL (ref 13.5–17.5)
LYMPHOCYTES ABSOLUTE: 1.4 K/UL (ref 1–5.1)
LYMPHOCYTES RELATIVE PERCENT: 19.9 %
MCH RBC QN AUTO: 32.2 PG (ref 26–34)
MCHC RBC AUTO-ENTMCNC: 33.2 G/DL (ref 31–36)
MCV RBC AUTO: 97 FL (ref 80–100)
MONOCYTES ABSOLUTE: 1.1 K/UL (ref 0–1.3)
MONOCYTES RELATIVE PERCENT: 14.5 %
NEUTROPHILS ABSOLUTE: 4.7 K/UL (ref 1.7–7.7)
NEUTROPHILS RELATIVE PERCENT: 64.4 %
PDW BLD-RTO: 12.9 % (ref 12.4–15.4)
PERFORMED ON: ABNORMAL
PLATELET # BLD: 229 K/UL (ref 135–450)
PMV BLD AUTO: 6.9 FL (ref 5–10.5)
POTASSIUM SERPL-SCNC: 4 MMOL/L (ref 3.5–5.1)
RBC # BLD: 4.11 M/UL (ref 4.2–5.9)
SARS-COV-2, NAAT: DETECTED
SODIUM BLD-SCNC: 135 MMOL/L (ref 136–145)
TOTAL PROTEIN: 6.7 G/DL (ref 6.4–8.2)
TROPONIN: <0.01 NG/ML
WBC # BLD: 7.2 K/UL (ref 4–11)

## 2022-08-10 PROCEDURE — 1200000000 HC SEMI PRIVATE

## 2022-08-10 PROCEDURE — 97530 THERAPEUTIC ACTIVITIES: CPT

## 2022-08-10 PROCEDURE — 97162 PT EVAL MOD COMPLEX 30 MIN: CPT

## 2022-08-10 PROCEDURE — 2580000003 HC RX 258: Performed by: EMERGENCY MEDICINE

## 2022-08-10 PROCEDURE — 2580000003 HC RX 258: Performed by: INTERNAL MEDICINE

## 2022-08-10 PROCEDURE — 85025 COMPLETE CBC W/AUTO DIFF WBC: CPT

## 2022-08-10 PROCEDURE — 97535 SELF CARE MNGMENT TRAINING: CPT

## 2022-08-10 PROCEDURE — 6370000000 HC RX 637 (ALT 250 FOR IP): Performed by: NURSE PRACTITIONER

## 2022-08-10 PROCEDURE — 99232 SBSQ HOSP IP/OBS MODERATE 35: CPT | Performed by: SPECIALIST/TECHNOLOGIST

## 2022-08-10 PROCEDURE — 97116 GAIT TRAINING THERAPY: CPT

## 2022-08-10 PROCEDURE — APPNB60 APP NON BILLABLE TIME 46-60 MINS: Performed by: SPECIALIST/TECHNOLOGIST

## 2022-08-10 PROCEDURE — 80053 COMPREHEN METABOLIC PANEL: CPT

## 2022-08-10 PROCEDURE — 99285 EMERGENCY DEPT VISIT HI MDM: CPT

## 2022-08-10 PROCEDURE — 97166 OT EVAL MOD COMPLEX 45 MIN: CPT

## 2022-08-10 PROCEDURE — 93005 ELECTROCARDIOGRAM TRACING: CPT | Performed by: EMERGENCY MEDICINE

## 2022-08-10 PROCEDURE — 73610 X-RAY EXAM OF ANKLE: CPT

## 2022-08-10 PROCEDURE — 73600 X-RAY EXAM OF ANKLE: CPT

## 2022-08-10 PROCEDURE — 71046 X-RAY EXAM CHEST 2 VIEWS: CPT

## 2022-08-10 PROCEDURE — 70450 CT HEAD/BRAIN W/O DYE: CPT

## 2022-08-10 PROCEDURE — 6370000000 HC RX 637 (ALT 250 FOR IP): Performed by: INTERNAL MEDICINE

## 2022-08-10 PROCEDURE — 6370000000 HC RX 637 (ALT 250 FOR IP): Performed by: SPECIALIST/TECHNOLOGIST

## 2022-08-10 PROCEDURE — 87635 SARS-COV-2 COVID-19 AMP PRB: CPT

## 2022-08-10 PROCEDURE — 93010 ELECTROCARDIOGRAM REPORT: CPT | Performed by: INTERNAL MEDICINE

## 2022-08-10 PROCEDURE — 84484 ASSAY OF TROPONIN QUANT: CPT

## 2022-08-10 RX ORDER — ACETAMINOPHEN 650 MG/1
650 SUPPOSITORY RECTAL EVERY 6 HOURS PRN
Status: DISCONTINUED | OUTPATIENT
Start: 2022-08-10 | End: 2022-08-11 | Stop reason: HOSPADM

## 2022-08-10 RX ORDER — 0.9 % SODIUM CHLORIDE 0.9 %
1000 INTRAVENOUS SOLUTION INTRAVENOUS ONCE
Status: COMPLETED | OUTPATIENT
Start: 2022-08-10 | End: 2022-08-10

## 2022-08-10 RX ORDER — ROSUVASTATIN CALCIUM 10 MG/1
20 TABLET, COATED ORAL NIGHTLY
Status: DISCONTINUED | OUTPATIENT
Start: 2022-08-10 | End: 2022-08-11 | Stop reason: HOSPADM

## 2022-08-10 RX ORDER — ONDANSETRON 2 MG/ML
4 INJECTION INTRAMUSCULAR; INTRAVENOUS EVERY 6 HOURS PRN
Status: DISCONTINUED | OUTPATIENT
Start: 2022-08-10 | End: 2022-08-11 | Stop reason: HOSPADM

## 2022-08-10 RX ORDER — SODIUM CHLORIDE 0.9 % (FLUSH) 0.9 %
5-40 SYRINGE (ML) INJECTION PRN
Status: DISCONTINUED | OUTPATIENT
Start: 2022-08-10 | End: 2022-08-11 | Stop reason: HOSPADM

## 2022-08-10 RX ORDER — CETIRIZINE HYDROCHLORIDE 10 MG/1
20 TABLET ORAL 2 TIMES DAILY
Status: DISCONTINUED | OUTPATIENT
Start: 2022-08-10 | End: 2022-08-11 | Stop reason: HOSPADM

## 2022-08-10 RX ORDER — ACETAMINOPHEN 325 MG/1
650 TABLET ORAL EVERY 6 HOURS PRN
Status: DISCONTINUED | OUTPATIENT
Start: 2022-08-10 | End: 2022-08-11 | Stop reason: HOSPADM

## 2022-08-10 RX ORDER — SODIUM CHLORIDE 9 MG/ML
INJECTION, SOLUTION INTRAVENOUS PRN
Status: DISCONTINUED | OUTPATIENT
Start: 2022-08-10 | End: 2022-08-11 | Stop reason: HOSPADM

## 2022-08-10 RX ORDER — TRAMADOL HYDROCHLORIDE 50 MG/1
50 TABLET ORAL EVERY 4 HOURS PRN
Status: DISCONTINUED | OUTPATIENT
Start: 2022-08-10 | End: 2022-08-11 | Stop reason: HOSPADM

## 2022-08-10 RX ORDER — TRAMADOL HYDROCHLORIDE 50 MG/1
25 TABLET ORAL EVERY 4 HOURS PRN
Status: DISCONTINUED | OUTPATIENT
Start: 2022-08-10 | End: 2022-08-11 | Stop reason: HOSPADM

## 2022-08-10 RX ORDER — CETIRIZINE HYDROCHLORIDE 10 MG/1
20 TABLET ORAL 2 TIMES DAILY
COMMUNITY

## 2022-08-10 RX ORDER — BENZONATATE 100 MG/1
100 CAPSULE ORAL 3 TIMES DAILY PRN
Status: DISCONTINUED | OUTPATIENT
Start: 2022-08-10 | End: 2022-08-11 | Stop reason: HOSPADM

## 2022-08-10 RX ORDER — METOPROLOL SUCCINATE 25 MG/1
25 TABLET, EXTENDED RELEASE ORAL 2 TIMES DAILY
Status: DISCONTINUED | OUTPATIENT
Start: 2022-08-11 | End: 2022-08-11 | Stop reason: HOSPADM

## 2022-08-10 RX ORDER — DOXEPIN HYDROCHLORIDE 25 MG/1
150 CAPSULE ORAL NIGHTLY
Status: DISCONTINUED | OUTPATIENT
Start: 2022-08-10 | End: 2022-08-11 | Stop reason: HOSPADM

## 2022-08-10 RX ORDER — POLYETHYLENE GLYCOL 3350 17 G/17G
17 POWDER, FOR SOLUTION ORAL DAILY PRN
Status: DISCONTINUED | OUTPATIENT
Start: 2022-08-10 | End: 2022-08-11 | Stop reason: HOSPADM

## 2022-08-10 RX ORDER — SODIUM CHLORIDE 0.9 % (FLUSH) 0.9 %
5-40 SYRINGE (ML) INJECTION EVERY 12 HOURS SCHEDULED
Status: DISCONTINUED | OUTPATIENT
Start: 2022-08-10 | End: 2022-08-11 | Stop reason: HOSPADM

## 2022-08-10 RX ORDER — ENOXAPARIN SODIUM 100 MG/ML
40 INJECTION SUBCUTANEOUS DAILY
Status: DISCONTINUED | OUTPATIENT
Start: 2022-08-10 | End: 2022-08-11 | Stop reason: HOSPADM

## 2022-08-10 RX ORDER — ONDANSETRON 4 MG/1
4 TABLET, ORALLY DISINTEGRATING ORAL EVERY 8 HOURS PRN
Status: DISCONTINUED | OUTPATIENT
Start: 2022-08-10 | End: 2022-08-11 | Stop reason: HOSPADM

## 2022-08-10 RX ORDER — PANTOPRAZOLE SODIUM 40 MG/1
40 TABLET, DELAYED RELEASE ORAL
Status: DISCONTINUED | OUTPATIENT
Start: 2022-08-11 | End: 2022-08-11 | Stop reason: HOSPADM

## 2022-08-10 RX ADMIN — DOXEPIN HYDROCHLORIDE 150 MG: 25 CAPSULE ORAL at 21:30

## 2022-08-10 RX ADMIN — TRAMADOL HYDROCHLORIDE 50 MG: 50 TABLET ORAL at 21:31

## 2022-08-10 RX ADMIN — ROSUVASTATIN CALCIUM 20 MG: 10 TABLET, COATED ORAL at 21:30

## 2022-08-10 RX ADMIN — CETIRIZINE HYDROCHLORIDE 20 MG: 10 TABLET, FILM COATED ORAL at 21:30

## 2022-08-10 RX ADMIN — Medication 10 ML: at 21:35

## 2022-08-10 RX ADMIN — BENZONATATE 100 MG: 100 CAPSULE ORAL at 21:30

## 2022-08-10 RX ADMIN — SODIUM CHLORIDE 1000 ML: 9 INJECTION, SOLUTION INTRAVENOUS at 08:19

## 2022-08-10 ASSESSMENT — PAIN DESCRIPTION - DESCRIPTORS: DESCRIPTORS: ACHING;THROBBING

## 2022-08-10 ASSESSMENT — PAIN DESCRIPTION - LOCATION: LOCATION: ANKLE

## 2022-08-10 ASSESSMENT — PAIN SCALES - GENERAL
PAINLEVEL_OUTOF10: 9
PAINLEVEL_OUTOF10: 6

## 2022-08-10 ASSESSMENT — PAIN - FUNCTIONAL ASSESSMENT
PAIN_FUNCTIONAL_ASSESSMENT: NONE - DENIES PAIN
PAIN_FUNCTIONAL_ASSESSMENT: PREVENTS OR INTERFERES SOME ACTIVE ACTIVITIES AND ADLS

## 2022-08-10 ASSESSMENT — PAIN DESCRIPTION - ORIENTATION: ORIENTATION: RIGHT

## 2022-08-10 NOTE — CONSULTS
Department of Orthopedic Surgery  Physician Assistant   Consult Note        Reason for Consult:  right fibula spiral fracture  Requesting Physician: Roxana Velasco MD  Date of Service: 8/10/2022 6:00 PM    CHIEF COMPLAINT:  As Above    History Obtained From:  patient    HISTORY OF PRESENT ILLNESS:                The patient is a 76 y.o. male with past medical history of vertigo, hypertension, coronary artery disease, who presents with above chief complaint. Patient states he woke up this morning and \"felt a little funny\". He proceeded to walk to the bathroom and wash his face like he does every morning, and the next thing he remembers was being down on the floor with his wife calling his name. His right ankle was wedged underneath the sink and twisted. When EMS arrived he was unable to put any weight on his right foot secondary to pain. He presented to [facility] where he was found to have a distal fibular fracture and orthopedics was consulted. Patient denies any dysesthesias, no other complaints. Of note, the patient states he has been experiencing a cough for the last 2 days and was found to be COVID-positive in the hospital.    Past Medical History:        Diagnosis Date    Arthritis     joints    CAD (coronary artery disease) 2008    stents x2    Hyperlipidemia     Hypertension     Panic anxiety syndrome      Past Surgical History:        Procedure Laterality Date    APPENDECTOMY      CHOLECYSTECTOMY      COLONOSCOPY  12/16/10    COLONOSCOPY  2/24/2015    Poylps    HERNIA REPAIR      bilateral inguinal    UPPER GASTROINTESTINAL ENDOSCOPY N/A 4/8/2021    EGD BIOPSY performed by Soha Diego MD at [address]         Medications Prior to Admission:   Prior to Admission medications    Medication Sig Start Date End Date Taking?  Authorizing Provider   cetirizine (ZYRTEC) 10 MG tablet Take 20 mg by mouth in the morning and at bedtime   Yes Historical Provider, MD   famotidine (PEPCID) 20 MG tablet Take 1 tablet by mouth 2 times daily for 5 days 6/13/22 6/18/22  Kristine Villeda MD   metoprolol succinate (TOPROL XL) 25 MG extended release tablet Take 1 tablet by mouth 2 times daily  Patient taking differently: Take 25 mg by mouth at bedtime 4/8/21   NAET Merino - CNP   pantoprazole (PROTONIX) 40 MG tablet Take 1 tablet by mouth every morning (before breakfast) 4/9/21   NATE Owen - CNP   rosuvastatin (CRESTOR) 10 MG tablet Take 20 mg by mouth daily. Historical Provider, MD   aspirin 325 MG tablet Take 325 mg by mouth daily. Historical Provider, MD   doxepin (SINEQUAN) 100 MG capsule Take 150 mg by mouth nightly    Historical Provider, MD   fluticasone (FLONASE) 50 MCG/ACT nasal spray 2 sprays by Nasal route daily. Historical Provider, MD       Allergies:  Losartan, Atorvastatin, Lisinopril, and Prednisone    Social History:    Tobacco:  reports that he quit smoking about 36 years ago. He has never used smokeless tobacco.   Alcohol:  reports no history of alcohol use. Illicit Drug: No  Family History:       Problem Relation Age of Onset    Heart Disease Father        REVIEW OF SYSTEMS:    CONSTITUTIONAL:  negative  MUSCULOSKELETAL:  positive for  pain  All other systems reviewed and negative    PHYSICAL EXAM:    awake, alert, cooperative, no apparent distress, and appears stated age  MUSCULOSKELETAL:  there is no redness, warmth, or swelling of the joints  full range of motion noted  motor strength is 5 out of 5 all extremities bilaterally  tone is normal  with exception of  RIGHT ANKLE: Immobilized in posterior splint.   Splint appropriately placed, clean dry and intact  Able to wiggle toes  Sensation intact light touch exposed toes into the right lower extremity through the padding  DP pulse 2+, foot warm and well-perfused    DATA:    CBC:   Recent Labs     08/10/22  0722   WBC 7.2   HGB 13.2*        BMP:    Recent Labs 08/10/22  0722   *   K 4.0      CO2 23   BUN 18   CREATININE 1.1   GLUCOSE 129*     INR: No results for input(s): INR in the last 72 hours. Radiology:   XR ANKLE RIGHT (MIN 3 VIEWS)   Final Result   Minimally displaced spiral fracture of the distal fibula-lateral malleolus      Mild widening of the medial clear space is seen, suggesting ligamentous   injury medially         CT HEAD WO CONTRAST   Final Result   No acute intracranial abnormality. XR CHEST (2 VW)   Final Result   No radiographic evidence of acute pulmonary disease. XR ANKLE RIGHT (2 VIEWS)    (Results Pending)          IMPRESSION/RECOMMENDATIONS:    Assessment: Distal fibula fracture, Fontana type B. Possible syndesmotic injury    Plan:  1) Discussed case with Dr. Bereket Joe. Patient will be managed conservatively at this time with nonoperative management. Remain in posterior splint immobilized until follow-up appointment. Follow-up outpatient with Dr. Bereket Joe in 10 days for repeat x-rays and evaluation of unstable ankle fracture and to assess the need for surgical intervention. Attempted gravity stress view of the right ankle in-house, however the radiology department failed to remove the splint for imaging therefore the image was not helpful in evaluating medial clear space. The case is complicated due to the fact that the patient is COVID-positive, he will need to be 10 days post symptom onset and asymptomatic before he can schedule an outpatient appointment. Strict ice and elevation of the right lower extremity to help reduce swelling. Nonweightbearing to the right ankle. PT/OT consult ordered. Okay to discharge from orthopedic standpoint, this was communicated to hospitalist.  Sandra Dao will sign off at this time, please call with any questions or concerns. FLP updated        Thank you for the opportunity to consult on this patient.     Chaka Eastman

## 2022-08-10 NOTE — DISCHARGE INSTRUCTIONS
Right ankle fracture  Non-weight bearing to right leg with use of walker  Keep splint on at all times. Keep splint clean and dry  OK to shower with bag over splint to keep it from getting wet  Ice and elevate to help with pain and swelling reduction  Follow up with Dr Paramjit Luis in 7-10 days.  Call [address] [place] [address] at [phone] to schedule an appointment or with any questions

## 2022-08-10 NOTE — ED NOTES
Patient and family updated on inpatient room assignment and COVID related visitor restrictions. Both patient and family verbalized understanding.       Jossie Duong RN  08/10/22 0034

## 2022-08-10 NOTE — PROGRESS NOTES
Physical Therapy  Facility/Department: [facility] C3 TELE/MED SURG/ONC  Physical Therapy Initial Assessment/Treatment    Name: Jesus Spain  : 1945  MRN: [id number]  Date of Service: 8/10/2022    Discharge Recommendations:  24 hour supervision or assist, Home with Home health PT   PT Equipment Recommendations  Equipment Needed: Yes  Mobility Devices: Thamas Alida: Standard      Patient Diagnosis(es): The primary encounter diagnosis was COVID-19. Diagnoses of Closed head injury, initial encounter, Syncope and collapse, Orthostatic hypotension, and Closed fracture of right ankle, initial encounter were also pertinent to this visit. Past Medical History:  has a past medical history of Arthritis, CAD (coronary artery disease), Hyperlipidemia, Hypertension, and Panic anxiety syndrome. Past Surgical History:  has a past surgical history that includes Hernia repair; Cholecystectomy; Appendectomy; Colonoscopy (12/16/10); Colonoscopy (2015); and Upper gastrointestinal endoscopy (N/A, 2021). Assessment   Body Structures, Functions, Activity Limitations Requiring Skilled Therapeutic Intervention: Decreased functional mobility ; Decreased endurance;Decreased balance;Decreased strength;Decreased safe awareness; Increased pain;Decreased coordination  Assessment: Pt presents to [facility] s/p syncope and collapse in bathroom. Pt reports R ankle pain and Xray reveals \"\" with orders for NWBing. PTA, pt lives in 2story home with spouse with 2 ENRIQUE and performs functional mobility IND. Pt currently functioning below baseline level requiring CGA-min(A) for balance, transfers, and ambulation with SW and VC for NWBing precautions, especially with fatigue. Pt would benefit from continued skilled PT to address current deficits. Anticipate pt will be able to d/c home with 24hr supervision and HHPT upon d/c; Barriers: 2 ENRIQUE home and full flight of steps to bedroom.  Discussed staying on first floor until HHPT for safety  Treatment Diagnosis: impaired functional mobility  Therapy Prognosis: Good  Decision Making: Medium Complexity  Requires PT Follow-Up: Yes  Activity Tolerance  Activity Tolerance: Patient tolerated evaluation without incident  Activity Tolerance Comments: Supine: /66, Sitting: /65, Standing: /52     Plan   Plan  Plan: 5-7 times per week  Current Treatment Recommendations: Strengthening, Balance training, Functional mobility training, Transfer training, Endurance training, Neuromuscular re-education, Stair training, Gait training, Pain management, Home exercise program, Patient/Caregiver education & training, Safety education & training, Therapeutic activities, Equipment evaluation, education, & procurement  Safety Devices  Type of Devices:  All fall risk precautions in place, Patient at risk for falls, Left in chair, Chair alarm in place, Nurse notified, Gait belt, Call light within reach     Restrictions  Restrictions/Precautions  Restrictions/Precautions: Weight Bearing, Fall Risk, Contact Precautions  Lower Extremity Weight Bearing Restrictions  Right Lower Extremity Weight Bearing: Non Weight Bearing  Position Activity Restriction  Other position/activity restrictions: tele, IV, NWB RLE, up with assist, Droplet +     Subjective   Pain: Denies pain at rest;  General  Chart Reviewed: Yes  Patient assessed for rehabilitation services?: Yes  Additional Pertinent Hx: R Ankle Xray 8/10/22 \"Minimally displaced spiral fracture of the distal fibula-lateral malleolus     Mild widening of the medial clear space is seen, suggesting ligamentous   injury medially  Response To Previous Treatment: Not applicable  Family / Caregiver Present: No  Referring Practitioner: YAW Peres  Referral Date : 08/10/22  Diagnosis: Syncope and collapse  Follows Commands: Within Functional Limits  General Comment  Comments: RN cleared pt for PT eval  Subjective  Subjective: Pt semi supine in bed upon arrival, agreeable to PT tx.         Social/Functional History  Social/Functional History  Lives With: Spouse  Type of Home: House  Home Layout: Two level, Bed/Bath upstairs, 1/2 bath on main level  Home Access: Stairs to enter with rails (grab bar at steps)  Entrance Stairs - Number of Steps: 2  Bathroom Shower/Tub: Tub/Shower unit, Shower chair with back  Jasper Toilet: Handicap height  Bathroom Equipment: Hand-held shower  Bathroom Accessibility: Walker accessible  Home Equipment: Atif Au, rolling, Wheelchair-manual  Has the patient had two or more falls in the past year or any fall with injury in the past year?: Yes (1 fall/syncope leading to admission)  ADL Assistance: Independent  Homemaking Assistance: Independent  Ambulation Assistance: Independent  Transfer Assistance: Independent  Active : Yes  Occupation: Retired  Type of Occupation: [employer]  Leisure & Hobbies: At home gym; lifts weights every other day, recumbent bike    Vision/Hearing  Vision  Vision: Impaired  Vision Exceptions: Wears glasses for reading  Hearing  Hearing: Within functional limits      Cognition   Orientation  Overall Orientation Status: Within Normal Limits  Orientation Level: Oriented X4     Objective   Heart Rate: 71  Heart Rate Source: Monitor  BP: (!) 114/52  BP Location: Right upper arm  BP Method: Automatic  Patient Position: Standing  MAP (Calculated): 72.67  Resp: 17  SpO2: 95 %  O2 Device: None (Room air)        Gross Assessment  AROM: Within functional limits  PROM: Within functional limits  Strength: Within functional limits  Coordination: Within functional limits  Tone: Normal  Sensation: Intact     Bed Mobility Training  Bed Mobility Training: Yes  Overall Level of Assistance: Stand-by assistance  Supine to Sit: Stand-by assistance; Additional time; Adaptive equipment (use of bedrails)  Balance  Sitting: Intact  Standing: Impaired  Standing - Static: Constant support;Good (SBA with SW)  Standing - Dynamic: Constant support; Lexie Connolly (CGA-min(A) Sw)  Transfer Training  Transfer Training: Yes  Overall Level of Assistance: Contact-guard assistance;Minimum assistance;Assist X1;Additional time; Adaptive equipment  Interventions: Verbal cues; Safety awareness training  Sit to Stand: Minimum assistance;Contact-guard assistance;Assist X1;Additional time; Adaptive equipment (VC for hand placement and NWBing technique)  Stand to Sit: Minimum assistance;Assist X1;Contact-guard assistance; Additional time; Adaptive equipment  Stand Pivot Transfers: Minimum assistance;Assist X1;Additional time; Adaptive equipment (bed to  chair with SW)  Gait Training  Gait Training: Yes  Right Side Weight Bearing: Non-weight bearing  Gait  Overall Level of Assistance: Contact-guard assistance;Minimum assistance;Assist X1;Additional time; Adaptive equipment (Minor LOBs requiring up to min(A) to correct; Occasional VC for NWBing precautions. With fatigue, pt demo increased difficulty maintaining NWBing)  Interventions: Verbal cues; Safety awareness training  Base of Support: Center of gravity altered  Step Length: Left shortened  Gait Abnormalities: Decreased step clearance; Path deviations;Trunk sway increased  Distance (ft): 15 Feet (15 ft x2)  Assistive Device: Gait belt;Walker       AM-PAC Score  AM-PAC Inpatient Mobility Raw Score : 20 (08/10/22 1600)  AM-PAC Inpatient T-Scale Score : 47.67 (08/10/22 1600)  Mobility Inpatient CMS 0-100% Score: 35.83 (08/10/22 1600)  Mobility Inpatient CMS G-Code Modifier : CJ (08/10/22 1600)      Goals  Short Term Goals  Time Frame for Short term goals: 7 days (8/17/22) unless otherwise noted  Short term goal 1: Pt will perform bed mobility IND  Short term goal 2: Pt will perform transfers with SW and supervision  Short term goal 3: Pt will ambulate 25 ft with SW and supervision  Short term goal 4: pt will perform 2 steps with HR and min(A)  Short term goal 5: Pt will perform BLE exercises 12-15 reps to improve strength for transfers by 8/13/22  Patient Goals   Patient goals : \"to walk to the bathroom\"       Education  Patient Education  Education Given To: Patient  Education Provided: Role of Therapy;Plan of Care;Precautions;Transfer Training;Home Exercise Program;Fall Prevention Strategies  Education Method: Verbal  Barriers to Learning: None  Education Outcome: Verbalized understanding;Continued education needed  Disease Specific Education: Pt educated on weight bearing status, appropriate DME, and safe mobility with AD. Pt verbalized understanding    Therapy Time   Individual Concurrent Group Co-treatment   Time In 1530         Time Out 1620         Minutes 50         Timed Code Treatment Minutes: 40 Minutes (10 min eval)     If pt is unable to be seen after this session, please let this note serve as discharge summary. Please see case management note for discharge disposition. Thank you.     Ciara Chau, PT

## 2022-08-10 NOTE — ACP (ADVANCE CARE PLANNING)
Advance Care Planning     General Advance Care Planning (ACP) Conversation    Date of Conversation: 8/10/2022  Conducted with: Patient with Decision Making Capacity    Healthcare Decision Maker:    Primary Decision Maker: Luz Marina Issa - Spouse - [phone]  Click here to complete Healthcare Decision Makers including selection of the Healthcare Decision Maker Relationship (ie \"Primary\"). Today we documented Decision Maker(s) consistent with Legal Next of Kin hierarchy.     Content/Action Overview:  Has NO ACP documents/care preferences - information provided, considering goals and options          Length of Voluntary ACP Conversation in minutes:  <16 minutes (Non-Billable)    MILLY Oshea

## 2022-08-10 NOTE — ED PROVIDER NOTES
CHIEF COMPLAINT  Loss of Consciousness (EMS states patient was in the restroom washing his hair in the sink and had syncopal episode. Unsure if he hit his head. Patient does not remember falling to the ground, but remembers his wife waking him up. )      HISTORY OF PRESENT ILLNESS  Marcia Ingram is a 76 y.o. male with a history of CAD, hyperlipidemia, hypertension, and panic attacks who presents to the ED complaining of loss of consciousness. Patient reports that he went into the bathroom this morning to wash his face as he does daily. Patient reports that he woke up laying on the floor and believes that he fell against a cabinet. Patient is unsure if striking his head. He reports scrapes on his knees as well as right lower leg pain. No additional complaints at this time. Tetanus booster is up-to-date. .  Patient reports that he has had a progressive cough over the last 2 weeks. He denies fevers, chills, or sweats. He is vaccinated and posted for COVID-19. No other complaints, modifying factors or associated symptoms. I have reviewed the following from the nursing documentation.     Past Medical History:   Diagnosis Date    Arthritis     joints    CAD (coronary artery disease) 2008    stents x2    Hyperlipidemia     Hypertension     Panic anxiety syndrome      Past Surgical History:   Procedure Laterality Date    APPENDECTOMY      CHOLECYSTECTOMY      COLONOSCOPY  12/16/10    COLONOSCOPY  2/24/2015    Poylps    HERNIA REPAIR      bilateral inguinal    UPPER GASTROINTESTINAL ENDOSCOPY N/A 4/8/2021    EGD BIOPSY performed by Purnima Giordano MD at [address]     Family History   Problem Relation Age of Onset    Heart Disease Father      Social History     Socioeconomic History    Marital status:      Spouse name: Not on file    Number of children: Not on file    Years of education: Not on file    Highest education level: Not on file   Occupational History    Not on file   Tobacco Use    Smoking alert. Cooperative. No acute distress. HEAD: Normocephalic. Atraumatic. EYES: PERRL. EOM's grossly intact. ENT: Mucous membranes are moist.   NECK: Supple, trachea midline. HEART: RRR. Normal S1, S2. No murmurs, rubs or gallops. LUNGS: Respirations unlabored. CTAB. Good air exchange. No wheezes, rales, or rhonchi. Speaking comfortably in full sentences. ABDOMEN: Soft. Non-distended. Non-tender. No guarding or rebound. Normal Bowel sounds. EXTREMITIES: Right lower extremity tenderness just proximal to the ankle. No gross deformity. Ankle appears to be atraumatic. Abrasions on bilateral knees. No peripheral edema. MAEE. No acute deformities. SKIN: Warm and dry. No acute rashes. NEUROLOGICAL: Alert and oriented X 3. CN II-XII intact. No gross facial drooping. Strength 5/5, sensation intact. No pronator drift. Normal coordination. PSYCHIATRIC: Normal mood and affect. LABS  I have reviewed all labs for this visit.    Results for orders placed or performed during the hospital encounter of 08/10/22   CBC with Auto Differential   Result Value Ref Range    WBC 7.2 4.0 - 11.0 K/uL    RBC 4.11 (L) 4.20 - 5.90 M/uL    Hemoglobin 13.2 (L) 13.5 - 17.5 g/dL    Hematocrit 39.9 (L) 40.5 - 52.5 %    MCV 97.0 80.0 - 100.0 fL    MCH 32.2 26.0 - 34.0 pg    MCHC 33.2 31.0 - 36.0 g/dL    RDW 12.9 12.4 - 15.4 %    Platelets 607 461 - 046 K/uL    MPV 6.9 5.0 - 10.5 fL    Neutrophils % 64.4 %    Lymphocytes % 19.9 %    Monocytes % 14.5 %    Eosinophils % 0.7 %    Basophils % 0.5 %    Neutrophils Absolute 4.7 1.7 - 7.7 K/uL    Lymphocytes Absolute 1.4 1.0 - 5.1 K/uL    Monocytes Absolute 1.1 0.0 - 1.3 K/uL    Eosinophils Absolute 0.1 0.0 - 0.6 K/uL    Basophils Absolute 0.0 0.0 - 0.2 K/uL   Comprehensive Metabolic Panel   Result Value Ref Range    Sodium 135 (L) 136 - 145 mmol/L    Potassium 4.0 3.5 - 5.1 mmol/L    Chloride 102 99 - 110 mmol/L    CO2 23 21 - 32 mmol/L    Anion Gap 10 3 - 16    Glucose 129 (H) 70 - 99 mg/dL    BUN 18 7 - 20 mg/dL    Creatinine 1.1 0.8 - 1.3 mg/dL    GFR Non-African American >60 >60    GFR African American >60 >60    Calcium 8.3 8.3 - 10.6 mg/dL    Total Protein 6.7 6.4 - 8.2 g/dL    Albumin 4.0 3.4 - 5.0 g/dL    Albumin/Globulin Ratio 1.5 1.1 - 2.2    Total Bilirubin 0.5 0.0 - 1.0 mg/dL    Alkaline Phosphatase 83 40 - 129 U/L    ALT 14 10 - 40 U/L    AST 18 15 - 37 U/L   POCT Glucose   Result Value Ref Range    POC Glucose 120 (H) 70 - 99 mg/dl    Performed on ACCU-CHEK    EKG 12 Lead   Result Value Ref Range    Ventricular Rate 63 BPM    Atrial Rate 63 BPM    P-R Interval 158 ms    QRS Duration 102 ms    Q-T Interval 410 ms    QTc Calculation (Bazett) 419 ms    P Axis 6 degrees    R Axis 14 degrees    T Axis 36 degrees    Diagnosis       Normal sinus rhythmInferior infarct (cited on or before 13-JUN-2022)Abnormal ECGWhen compared with ECG of 08-JUL-2022 06:20,No significant change was found       EKG  The Ekg interpreted by myself    Normal sinus rhythm with a rate of 63. Normal axis. Normal intervals and durations. Inferior Q waves noted. EKG is unchanged when compared to previous EKG on July 8, 2022. RADIOLOGY  X-RAYS:  I have reviewed radiologic plain film image(s). ALL OTHER NON-PLAIN FILM IMAGES SUCH AS CT, ULTRASOUND AND MRI HAVE BEEN READ BY THE RADIOLOGIST. XR ANKLE RIGHT (MIN 3 VIEWS)   Final Result   Minimally displaced spiral fracture of the distal fibula-lateral malleolus      Mild widening of the medial clear space is seen, suggesting ligamentous   injury medially         CT HEAD WO CONTRAST   Final Result   No acute intracranial abnormality. XR CHEST (2 VW)   Final Result   No radiographic evidence of acute pulmonary disease. XR ANKLE RIGHT (2 VIEWS)    (Results Pending)          Orthostatics positive. Rechecks: Physical assessment performed. Blood pressure improved throughout patient's stay. ED COURSE/MDM  Patient seen and evaluated.  Old records reviewed. Labs and imaging reviewed and results discussed with patient. Patient was given normal saline in the ED with good symptomatic relief. Patient was reassessed as noted above . Patient's labs are significant for positive COVID-19 test.  Orthostatics are positive. Additionally, patient has noted fracture of the right ankle. Splint was placed by the ED tech staff. Ortho and hospitalist consulted. Plan of care discussed with patient and family. Patient and family in agreement with plan. Patient was given scripts for the following medications. I counseled patient how to take these medications. New Prescriptions    No medications on file       CLINICAL IMPRESSION  1. COVID-19    2. Closed head injury, initial encounter    3. Syncope and collapse    4. Orthostatic hypotension    5. Closed fracture of right ankle, initial encounter        Blood pressure 120/70, pulse 66, temperature 98.6 °F (37 °C), temperature source Oral, resp. rate 19, weight 175 lb (79.4 kg), SpO2 99 %. DISPOSITION  Cate Joya was admitted in stable condition.          Evelin [facility], DO  08/10/22 1101 [facility], DO  08/10/22 [id number]

## 2022-08-10 NOTE — CARE COORDINATION
CASE MANAGEMENT INITIAL ASSESSMENT      Reviewed chart and completed assessment with patient and wife  Family present: wife  Explained Case Management role/services. yes    Primary contact information:wife, [facility] - MELISSA Decision Maker :   Primary Decision Maker: Luz Marina Issa - Spouse - [phone]          Can this person be reached and be able to respond quickly, such as within a few minutes or hours? Yes      Admit date/status:8/10/22  Diagnosis:COVID+   Is this a Readmission?:  No      Insurance:medicare   Precert required for SNF: No       3 night stay required: waived due to [address] arrangements, Adls, care needs, prior to admission:home with wife, Independent in ADLs    Durable Medical Equipment at home:  Walker__Cane__RTS__ BSC__Shower Chair__  02__ HHN__ CPAP__  BiPap__  Hospital Bed__ W/C___ Other_____    Services in the home and/or outpatient, prior to admission:none    Current PCP:Dr. Grzegorz Blake                                Medications:yes Prescription coverage? Yes Will pt require financial assistance with medications No     Transportation needs: car     Dialysis Facility (if applicable)   Name:  Address:  Dialysis Schedule:  Phone:  Fax:    PT/OT recs:    Hospital Exemption Notification (HEN):    Barriers to discharge:medical complications    Plan/comments:Referred to patient for d/c planning. Spoke to patient and wife. Patient is a 76year old male admitted for COVID+. Patient usually lives at home with wife. Patient is independent in ADLs. Patient currently denies d/c needs.   Electronically signed by MILLY Uriarte on 8/10/2022 at 11:34 AM      [facility] on chart for MD signature

## 2022-08-10 NOTE — PROGRESS NOTES
Occupational Therapy  Facility/Department: [facility] C3 TELE/MED SURG/ONC  Occupational Therapy Initial Assessment and Treatment   Co-tx collaboration this date to safely meet goals and will have better occupational performance outcomes with in a co-treatment than 1:1 session. Name: Irina Flowers  : 1945  MRN: [id number]  Date of Service: 8/10/2022    Discharge Recommendations:  24 hour supervision or assist, Home with Home health OT  OT Equipment Recommendations  Equipment Needed: Yes  Mobility Devices: ADL Assistive Devices  ADL Assistive Devices: Toileting - 3-in-1 Commode  Other: pt would benefit from commode for toileting overnight d/t confined space of bedroom and decreased balance in stance/NWB RLE/use of SW, anticipated especially with fatigue/decreased alertness when waking       Patient Diagnosis(es): The primary encounter diagnosis was COVID-19. Diagnoses of Closed head injury, initial encounter, Syncope and collapse, Orthostatic hypotension, and Closed fracture of right ankle, initial encounter were also pertinent to this visit. Past Medical History:  has a past medical history of Arthritis, CAD (coronary artery disease), Hyperlipidemia, Hypertension, and Panic anxiety syndrome. Past Surgical History:  has a past surgical history that includes Hernia repair; Cholecystectomy; Appendectomy; Colonoscopy (12/16/10); Colonoscopy (2015); and Upper gastrointestinal endoscopy (N/A, 2021). Assessment   Performance deficits / Impairments: Decreased functional mobility ; Decreased strength;Decreased endurance;Decreased coordination;Decreased ADL status; Decreased safe awareness;Decreased balance  Assessment: Irina Flowers is a(n) 76 y.o. male admitted to [facility] 8/10 s/p syncope and collapse with subsequent R ankle fx while in bathroom at home.    PLOF: pt independent at home, active   Due to NWB RLE, pt required:   Functional Mobility: SBA for supine <>sit, CGA-MIN A x1-2 for sit <> stand, CGA x1-2 with SW for bed to chair to toilet to chair, Min A with SW for toilet transfer  ADL Training: Min A for yamel care after BM (Min A for balance while wiping), Mod A for LB dressing: R sock over ACE wrap, CGA for grooming (hand washing in stance)  Pt required consistent verbal and visual cues for safety, including use of SW for adherence to NWB RLE. Pt is functioning below baseline and would benefit from cont OT while in acute care. Once medically appropriate, rec Home with 24/7 Assist/Supervision  and HHOT upon d/c.      Prognosis: Good  Decision Making: Medium Complexity  REQUIRES OT FOLLOW-UP: Yes  Activity Tolerance  Activity Tolerance: Patient Tolerated treatment well        Plan   Plan  Times per Week: 4-6  Current Treatment Recommendations: Strengthening, Balance training, Functional mobility training, Endurance training, Safety education & training, Equipment evaluation, education, & procurement, Patient/Caregiver education & training, Self-Care / ADL, Coordination training     Restrictions  Restrictions/Precautions  Restrictions/Precautions: Weight Bearing, Fall Risk, Contact Precautions  Lower Extremity Weight Bearing Restrictions  Right Lower Extremity Weight Bearing: Non Weight Bearing  Position Activity Restriction  Other position/activity restrictions: tele, IV, NWB RLE, up with assist, Droplet +    Subjective   General  Chart Reviewed: Yes  Patient assessed for rehabilitation services?: Yes  Family / Caregiver Present: No  Referring Practitioner: Jose J Signs, PA  Diagnosis: R Ankle Fx, Syncope and collapse, COVID 19  Subjective  Subjective: pt in bed, finishing with XRay at arrival, pleasant and agreeable to therapy co eval; reporting 5/10 pain in R ankle, RN agreeable to therapy  Denies pain at rest;    Social/Functional History  Social/Functional History  Lives With: Spouse  Type of Home: House  Home Layout: Two level, Bed/Bath upstairs, 1/2 bath on main level  Home Access: Stairs to enter with rails (grab bar at steps)  Entrance Stairs - Number of Steps: 2  Bathroom Shower/Tub: Tub/Shower unit, Shower chair with back  [place] Toilet: Handicap height  Bathroom Equipment: Hand-held shower  Bathroom Accessibility: Walker accessible  Home Equipment: Jaye Vaishali, rolling, Wheelchair-manual  Has the patient had two or more falls in the past year or any fall with injury in the past year?: Yes (1 fall/syncope leading to admission)  ADL Assistance: Independent  Homemaking Assistance: Independent  Ambulation Assistance: Independent  Transfer Assistance: Independent  Active : Yes  Occupation: Retired  Type of Occupation: [employer]  Leisure & Hobbies: At home gym; lifts weights every other day, recumbent bike       Objective   Heart Rate: 71  Heart Rate Source: Monitor  BP: (!) 114/52  BP Location: Right upper arm  BP Method: Automatic  Patient Position: Standing  MAP (Calculated): 72.67  Resp: 17  SpO2: 95 %  O2 Device: None (Room air)  Orthostatic Hypotension readings: pt largely asymptomatic   Supine 119/66 61 BPM   Sitting /65 63 BPM   Standing /52 71 BPM             Observation/Palpation  Posture: Good (with SW)  Safety Devices  Type of Devices: All fall risk precautions in place; Patient at risk for falls; Left in chair;Chair alarm in place;Nurse notified;Gait belt;Call light within reach  Bed Mobility Training  Bed Mobility Training: Yes  Overall Level of Assistance: Stand-by assistance  Supine to Sit: Stand-by assistance; Additional time; Adaptive equipment (use of bedrails)  Sit to Supine: Other (comment) (pt in chair at end of session)  Balance  Sitting: Intact  Standing: Impaired  Standing - Static: Constant support;Good (SBA with SW)  Standing - Dynamic: Constant support; Fair (CGA-min(A) Sw)  Transfer Training  Transfer Training: Yes  Overall Level of Assistance: Contact-guard assistance;Minimum assistance;Assist X1;Additional time; Adaptive equipment  Interventions: Verbal cues; Safety awareness training  Sit to Stand: Minimum assistance;Contact-guard assistance;Assist X1;Additional time; Adaptive equipment (VC for hand placement and NWBing technique)  Stand to Sit: Minimum assistance;Assist X1;Contact-guard assistance; Additional time; Adaptive equipment  Stand Pivot Transfers: Minimum assistance;Assist X1;Additional time; Adaptive equipment (bed to chair with SW)  Toilet Transfer: Minimum assistance;Assist X1;Additional time; Adaptive equipment (with SW and grab bars, multiple VC for technique)  Gait Training  Gait Training: Yes  Right Side Weight Bearing: Non-weight bearing  Gait  Overall Level of Assistance: Contact-guard assistance;Minimum assistance;Assist X1;Additional time; Adaptive equipment (Minor LOBs requiring up to min(A) to correct; Occasional VC for NWBing precautions. With fatigue, pt demo increased difficulty maintaining NWBing)  Interventions: Verbal cues; Safety awareness training  Base of Support: Center of gravity altered  Step Length: Left shortened  Gait Abnormalities: Decreased step clearance; Path deviations;Trunk sway increased  Distance (ft): 15 Feet (15 ft x2)  Assistive Device: Gait belt;Walker     AROM: Generally decreased, functional  PROM: Generally decreased, functional  Strength: Generally decreased, functional  Coordination: Generally decreased, functional  Tone: Normal  Sensation: Intact (baseline neuropathy)  ADL  Grooming: Contact guard assistance  Grooming Skilled Clinical Factors: at sink for hand washing  LE Dressing: Moderate assistance  LE Dressing Skilled Clinical Factors:  Mod A for R sock donning over ACE wrap  Toileting: Minimal assistance  Toileting Skilled Clinical Factors: Min A for balance in stance during posterior yamel cleansing post BM     Activity Tolerance  Activity Tolerance: Patient tolerated evaluation without incident  Activity Tolerance Comments: Supine: /66, Sitting: /65, Standing: BP 114/52        Vision  Vision: Impaired  Vision Exceptions: Wears glasses for reading  Hearing  Hearing: Within functional limits  Cognition  Overall Cognitive Status: Exceptions  Arousal/Alertness: Appropriate responses to stimuli  Following Commands: Follows one step commands with repetition  Attention Span: Attends with cues to redirect  Memory: Appears intact  Safety Judgement: Decreased awareness of need for safety;Decreased awareness of need for assistance  Problem Solving: Assistance required to generate solutions  Insights: Decreased awareness of deficits  Initiation: Requires cues for some  Sequencing: Requires cues for some  Orientation  Overall Orientation Status: Within Normal Limits  Orientation Level: Oriented X4  Perception  Overall Perceptual Status: WFL            Dynamic Standing Balance Exercises: CGA x1-2 with SW for chair <>toilet with cues for technique of NWB RLE  Education Given To: Patient  Education Provided: Role of Therapy;Plan of Care;Transfer Training;ADL Adaptive Strategies; Fall Prevention Strategies; Equipment;Precautions  Education Provided Comments: disease specific: use of SW and NWB RLE, use of call light and safety within hospital  Education Method: Verbal;Demonstration  Barriers to Learning: None  Education Outcome: Verbalized understanding                      AM-PAC Score        AM-[facility] Inpatient Daily Activity Raw Score: 19 (08/10/22 1648)  -PAC Inpatient ADL T-Scale Score : 40.22 (08/10/22 1648)  ADL Inpatient CMS 0-100% Score: 42.8 (08/10/22 1648)  ADL Inpatient CMS G-Code Modifier : CK (08/10/22 1648)    Goals  Short Term Goals  Time Frame for Short term goals: within 7 days by 8/17 unless noted  Short Term Goal 1: pt will perform toilet transfer with SBA and LRAD  Short Term Goal 2: pt will perform 2-3 ADLs in stance at sink with SBA  Short Term Goal 3: pt will perform LB dressing with AD PRN and Min A  Short Term Goal 4: pt will perform BUE ther ex x15 in anticipation of prolonged SW use by 8/14  Patient Goals   Patient goals : to go home       Therapy Time   Individual Concurrent Group Co-treatment   Time In 1532         Time Out 1620         Minutes 48         Timed Code Treatment Minutes: 38 Minutes (10 minute eval)     If pt is unable to be seen after this session, please let this note serve as discharge summary. Please see case management note for discharge disposition. Thank you.    Sophia Cervantes, OT

## 2022-08-10 NOTE — PROGRESS NOTES
Admitted pt to c332 in stable condition and placed on DROP + precaution. RLE in splint with ice on it. Oriented pt to room, call light and POC. Pt verbalized understanding of calling for assistance before getting up. Bed check in place for safety. Spoke to PA from ortho multiple times. Pt stable on RA and only c/o cough here and there which MD is aware of and states he will order cough medicine. Lungs clear. BS active and pt ate most of lunch. Pt stable and denied needs when writer left room. PT/OT worked with pt and they state he did well wilth NWB with walker. They think he will need assistance getting inside home since there are 2 steps to get in and may need ambulance transport. Of note, pt has 13 steps to get to bedroom and pt/ot state he is very adamant about sleeping in bedroom.

## 2022-08-10 NOTE — CONSULTS
Consult placed  RN called consult to DAYNA Oviedo  Date:8/10/2022,  Time:1:18 PM        Electronically signed by Citlalli Delong on 8/10/2022 at 1:18 PM

## 2022-08-10 NOTE — ED NOTES
Pt ambulated to bathroom without any assistance. Writer on standby. Pt had unsteady gait with difficulty bearing weight on right ankle.    Pt SpO2 began and remained at 95%, HR ranged between 60-70 bpm.      Lottie Chaves RN  08/10/22 1002

## 2022-08-11 VITALS
OXYGEN SATURATION: 92 % | SYSTOLIC BLOOD PRESSURE: 134 MMHG | RESPIRATION RATE: 16 BRPM | HEART RATE: 69 BPM | WEIGHT: 181.66 LBS | BODY MASS INDEX: 29.2 KG/M2 | HEIGHT: 66 IN | DIASTOLIC BLOOD PRESSURE: 70 MMHG | TEMPERATURE: 98.2 F

## 2022-08-11 LAB
ANION GAP SERPL CALCULATED.3IONS-SCNC: 7 MMOL/L (ref 3–16)
BASOPHILS ABSOLUTE: 0 K/UL (ref 0–0.2)
BASOPHILS RELATIVE PERCENT: 0.4 %
BUN BLDV-MCNC: 21 MG/DL (ref 7–20)
CALCIUM SERPL-MCNC: 9.2 MG/DL (ref 8.3–10.6)
CHLORIDE BLD-SCNC: 101 MMOL/L (ref 99–110)
CO2: 29 MMOL/L (ref 21–32)
CREAT SERPL-MCNC: 1.1 MG/DL (ref 0.8–1.3)
EOSINOPHILS ABSOLUTE: 0 K/UL (ref 0–0.6)
EOSINOPHILS RELATIVE PERCENT: 0.3 %
GFR AFRICAN AMERICAN: >60
GFR NON-AFRICAN AMERICAN: >60
GLUCOSE BLD-MCNC: 111 MG/DL (ref 70–99)
HCT VFR BLD CALC: 41.9 % (ref 40.5–52.5)
HEMOGLOBIN: 14 G/DL (ref 13.5–17.5)
LV EF: 65 %
LVEF MODALITY: NORMAL
LYMPHOCYTES ABSOLUTE: 1.4 K/UL (ref 1–5.1)
LYMPHOCYTES RELATIVE PERCENT: 24.1 %
MCH RBC QN AUTO: 32.5 PG (ref 26–34)
MCHC RBC AUTO-ENTMCNC: 33.5 G/DL (ref 31–36)
MCV RBC AUTO: 97.1 FL (ref 80–100)
MONOCYTES ABSOLUTE: 1 K/UL (ref 0–1.3)
MONOCYTES RELATIVE PERCENT: 17 %
NEUTROPHILS ABSOLUTE: 3.4 K/UL (ref 1.7–7.7)
NEUTROPHILS RELATIVE PERCENT: 58.2 %
PDW BLD-RTO: 13 % (ref 12.4–15.4)
PLATELET # BLD: 239 K/UL (ref 135–450)
PMV BLD AUTO: 7 FL (ref 5–10.5)
POTASSIUM REFLEX MAGNESIUM: 4.2 MMOL/L (ref 3.5–5.1)
RBC # BLD: 4.31 M/UL (ref 4.2–5.9)
SODIUM BLD-SCNC: 137 MMOL/L (ref 136–145)
WBC # BLD: 5.8 K/UL (ref 4–11)

## 2022-08-11 PROCEDURE — 97535 SELF CARE MNGMENT TRAINING: CPT

## 2022-08-11 PROCEDURE — 6360000002 HC RX W HCPCS: Performed by: INTERNAL MEDICINE

## 2022-08-11 PROCEDURE — 97530 THERAPEUTIC ACTIVITIES: CPT

## 2022-08-11 PROCEDURE — 6370000000 HC RX 637 (ALT 250 FOR IP): Performed by: INTERNAL MEDICINE

## 2022-08-11 PROCEDURE — 6370000000 HC RX 637 (ALT 250 FOR IP): Performed by: NURSE PRACTITIONER

## 2022-08-11 PROCEDURE — 97110 THERAPEUTIC EXERCISES: CPT

## 2022-08-11 PROCEDURE — 97116 GAIT TRAINING THERAPY: CPT

## 2022-08-11 PROCEDURE — 80048 BASIC METABOLIC PNL TOTAL CA: CPT

## 2022-08-11 PROCEDURE — 6370000000 HC RX 637 (ALT 250 FOR IP): Performed by: SPECIALIST/TECHNOLOGIST

## 2022-08-11 PROCEDURE — 85025 COMPLETE CBC W/AUTO DIFF WBC: CPT

## 2022-08-11 PROCEDURE — 2580000003 HC RX 258: Performed by: INTERNAL MEDICINE

## 2022-08-11 PROCEDURE — 36415 COLL VENOUS BLD VENIPUNCTURE: CPT

## 2022-08-11 PROCEDURE — 93306 TTE W/DOPPLER COMPLETE: CPT

## 2022-08-11 PROCEDURE — APPNB45 APP NON BILLABLE 31-45 MINUTES: Performed by: SPECIALIST/TECHNOLOGIST

## 2022-08-11 RX ORDER — TRAMADOL HYDROCHLORIDE 50 MG/1
50 TABLET ORAL EVERY 8 HOURS PRN
Qty: 12 TABLET | Refills: 0 | Status: SHIPPED | OUTPATIENT
Start: 2022-08-11 | End: 2022-08-15

## 2022-08-11 RX ORDER — TRAMADOL HYDROCHLORIDE 50 MG/1
50 TABLET ORAL EVERY 8 HOURS PRN
Qty: 12 TABLET | Refills: 0 | Status: SHIPPED | OUTPATIENT
Start: 2022-08-11 | End: 2022-08-11 | Stop reason: SDUPTHER

## 2022-08-11 RX ADMIN — ENOXAPARIN SODIUM 40 MG: 100 INJECTION SUBCUTANEOUS at 08:41

## 2022-08-11 RX ADMIN — Medication 10 ML: at 08:44

## 2022-08-11 RX ADMIN — BENZONATATE 100 MG: 100 CAPSULE ORAL at 08:42

## 2022-08-11 RX ADMIN — PANTOPRAZOLE SODIUM 40 MG: 40 TABLET, DELAYED RELEASE ORAL at 06:53

## 2022-08-11 RX ADMIN — TRAMADOL HYDROCHLORIDE 50 MG: 50 TABLET ORAL at 06:57

## 2022-08-11 RX ADMIN — CETIRIZINE HYDROCHLORIDE 20 MG: 10 TABLET, FILM COATED ORAL at 08:41

## 2022-08-11 ASSESSMENT — PAIN - FUNCTIONAL ASSESSMENT
PAIN_FUNCTIONAL_ASSESSMENT: PREVENTS OR INTERFERES SOME ACTIVE ACTIVITIES AND ADLS
PAIN_FUNCTIONAL_ASSESSMENT: PREVENTS OR INTERFERES SOME ACTIVE ACTIVITIES AND ADLS

## 2022-08-11 ASSESSMENT — PAIN DESCRIPTION - ORIENTATION
ORIENTATION: RIGHT
ORIENTATION: RIGHT

## 2022-08-11 ASSESSMENT — PAIN DESCRIPTION - LOCATION
LOCATION: ANKLE
LOCATION: ANKLE

## 2022-08-11 ASSESSMENT — PAIN SCALES - GENERAL
PAINLEVEL_OUTOF10: 6
PAINLEVEL_OUTOF10: 5
PAINLEVEL_OUTOF10: 6

## 2022-08-11 ASSESSMENT — PAIN DESCRIPTION - DESCRIPTORS
DESCRIPTORS: ACHING
DESCRIPTORS: ACHING

## 2022-08-11 NOTE — DISCHARGE INSTR - COC
Out) 04/07/21 04/07/21 04/07/21 COVID-19, Rapid (Ordered)   04/07/21 Rule-Out Test Resulted            Nurse Assessment:  Last Vital Signs: /61   Pulse 72   Temp 98.9 °F (37.2 °C) (Oral)   Resp 16   Ht 5' 6\" (1.676 m)   Wt 181 lb 10.5 oz (82.4 kg)   SpO2 91%   BMI 29.32 kg/m²     Last documented pain score (0-10 scale): Pain Level: 5  Last Weight:   Wt Readings from Last 1 Encounters:   08/10/22 181 lb 10.5 oz (82.4 kg)     Mental Status:  oriented, alert, coherent, logical, thought processes intact, and able to concentrate and follow conversation    IV Access:  - None    Nursing Mobility/ADLs:  Walking   Assisted  Transfer  Assisted  Bathing  Independent  Dressing  Independent  Toileting  Assisted  Feeding  [facility]   whole    Wound Care Documentation and Therapy:        Elimination:  Continence: Bowel: Yes  Bladder: Yes  Urinary Catheter: None   Colostomy/Ileostomy/Ileal Conduit: No       Date of Last BM: 8/11/22    Intake/Output Summary (Last 24 hours) at 8/11/2022 1157  Last data filed at 8/11/2022 0655  Gross per 24 hour   Intake 970 ml   Output 800 ml   Net 170 ml     I/O last 3 completed shifts: In: 1 [P.O.:960; I.V.:10]  Out: 800 [Urine:800]    Safety Concerns: At Risk for Falls    Impairments/Disabilities:      R ankle fracture    Nutrition Therapy:  Current Nutrition Therapy:   - Oral Diet:  General    Routes of Feeding: Oral  Liquids: Thin Liquids  Daily Fluid Restriction: no  Last Modified Barium Swallow with Video (Video Swallowing Test): not done    Treatments at the Time of Hospital Discharge:   Respiratory Treatments:   Oxygen Therapy:  is not on home oxygen therapy.   Ventilator:    - No ventilator support    Rehab Therapies: Physical Therapy, Occupational Therapy, and Nurse (therapy soc approved if needed)  Weight Bearing Status/Restrictions: Non-weight bearing on right leg  Other Medical Equipment (for information only, NOT a DME order):  walker  Other Treatments: HOME HEALTH CARE: LEVEL 3 [address] to establish plan of care for patient over 60 day period   Nursing  Initial home SN evaluation visit to occur within 24-48 hours for:  1)  medication management  2)  VS and clinical assessment  3)  S&S chronic disease exacerbation education + when to contact MD/NP  4)  care coordination  Medication Reconciliation during 1st SN visit  PT/OT/Speech   Evaluations in home within 24-48 hours of discharge to include DME and home safety   Frontload therapy 5 days, then 3x a week   OT to evaluate if patient has [address] needs for personal care    evaluation within 24-48 hours to evaluate resources & insurance for potential AL, [place], LTC, and Medicaid options   Palliative Care referral within 5 days of hospital discharge   PCP Visit scheduled within 3 - 7 days of hospital discharge    [address] (If patient is agreeable and meets guidelines)      Patient's personal belongings (please select all that are sent with patient):  None    RN SIGNATURE:  Electronically signed by Imtiaz Morales RN on 8/11/22 at 5:46 PM EDT    CASE MANAGEMENT/SOCIAL WORK SECTION    Inpatient Status Date: 8/10/22    Readmission Risk Assessment Score:  Readmission Risk              Risk of Unplanned Readmission:  [id number]           Discharging to Facility/ [address]   [facility]   [address] [place] [address]   [phone]          / signature: Electronically signed by Ciaran Calderon RN on 8/11/22 at 2:27 PM EDT    PHYSICIAN SECTION    Prognosis: Fair    Condition at Discharge: Stable    Rehab Potential (if transferring to Rehab): Fair    Recommended Follow-up, Labs or Other Treatments After Discharge:    Right ankle fracture  Non-weight bearing to right leg with use of walker  Keep splint on at all times.  Keep splint clean and dry  OK to shower with bag over splint to keep it from getting wet  Ice and elevate to help with pain and swelling reduction  Follow up with Dr Omid Andres in 7-10 days. Call [address] [place] [address] at [phone] to schedule an appointment or with any questions               Physician Certification: I certify the above information and transfer of [place]  is necessary for the continuing treatment of the diagnosis listed and that he requires [address] for less 30 days.      Update Admission H&P: No change in H&P    PHYSICIAN SIGNATURE:  Electronically signed by Shiv Gusman MD on 8/11/22 at 2:45 PM EDT

## 2022-08-11 NOTE — PROGRESS NOTES
Shift assessment and VS completed and charted. New orders noted and home dose of doxepin and zyrtec were started. New order for tessalon perle for dry non productive cough. Lungs clear, no resp distress. RLE with strong and positive popliteal pulse. Ace wrap from toes to below knee. Toes warm and pink; cap refill less than 3 seconds. PRN tramadol given for R ankle pain 9/10. Pt. Aware of NWB on RLE. Denies any needs.

## 2022-08-11 NOTE — PROGRESS NOTES
Physical Therapy  Facility/Department: [facility] C3 TELE/MED SURG/ONC  Daily Treatment Note  NAME: Yesenia Maldonado  : 1945  MRN: [id number]    Date of Service: 2022    Discharge Recommendations:  24 hour supervision or assist, Home with Home health PT   PT Equipment Recommendations  Equipment Needed: Yes  Mobility Devices: Garfield Nailer: Standard  [facility] 6 Clicks Inpatient Mobility:  AM-PAC Mobility Inpatient   How much difficulty turning over in bed?: None  How much difficulty sitting down on / standing up from a chair with arms?: A Little  How much difficulty moving from lying on back to sitting on side of bed?: None  How much help from another person moving to and from a bed to a chair?: A Little  How much help from another person needed to walk in hospital room?: A Little  How much help from another person for climbing 3-5 steps with a railing?: A Lot  AM-PAC Inpatient Mobility Raw Score : 19  AM-PAC Inpatient T-Scale Score : 45.44  Mobility Inpatient CMS 0-100% Score: 41.77  Mobility Inpatient CMS G-Code Modifier : CK    Patient Diagnosis(es): The primary encounter diagnosis was COVID-19. Diagnoses of Closed head injury, initial encounter, Syncope and collapse, Orthostatic hypotension, and Closed fracture of right ankle, initial encounter were also pertinent to this visit. Assessment   Assessment: Pt transfers STS cga and ambulates cga with sw 25'. Pt instructed in use of sw to negotiate stasir home entrance, but unable to trial d/t pt in -room precuations. Pt may require either a w/c bump-up with assist fro son in law or paid transport. Cont per POC. Activity Tolerance: Patient tolerated evaluation without incident  Equipment Needed: Yes  Mobility Devices: [address]: 5-7 times per week  Current Treatment Recommendations: Strengthening;Balance training;Functional mobility training;Transfer training; Endurance training;Neuromuscular re-education;Stair training;Gait training;Pain management;Home exercise program;Patient/Caregiver education & training; Safety education & training; Therapeutic activities; Equipment evaluation, education, & procurement     Restrictions  Restrictions/Precautions  Restrictions/Precautions: Weight Bearing, Fall Risk, Contact Precautions  Lower Extremity Weight Bearing Restrictions  Right Lower Extremity Weight Bearing: Non Weight Bearing  Position Activity Restriction  Other position/activity restrictions: tele, IV, NWB RLE, up with assist, Droplet +     Subjective    Subjective  Subjective: Pt agrees to PT session  Pain: Denies pain at rest;  Orientation  Overall Orientation Status: Within Normal Limits  Cognition  Safety Judgement: Decreased awareness of need for safety;Decreased awareness of need for assistance  Insights: Decreased awareness of deficits     Objective   Vitals     Vitals:    08/11/22 1312   BP: 129/75   Pulse: 72   Resp:    Temp:    SpO2: 92%       Bed Mobility Training  Bed Mobility Training: Yes  Supine to Sit: Stand-by assistance  Balance  Sitting: Intact  Standing: Impaired (SW, functional mobility to/from bathroom, toileting, sink ADLs, pt grossly CGA with occasional min A for balance when completing BADL tasks)  Standing - Static: Good  Standing - Dynamic: Fair  Transfer Training  Transfer Training: Yes (SW)  Interventions: Safety awareness training;Verbal cues  Sit to Stand: Contact-guard assistance  Stand to Sit: Contact-guard assistance  Toilet Transfer: Contact-guard assistance (with use of L grab bar)  Gait Training  Gait Training: Yes  Right Side Weight Bearing: Non-weight bearing  Gait  Overall Level of Assistance: Contact-guard assistance; Adaptive equipment; Additional time (VC for NWBing precautions. With fatigue, pt demo increased difficulty maintaining NWBing)  Interventions: Verbal cues; Safety awareness training  Base of Support: Center of gravity altered  Step Length: Left shortened  Gait Abnormalities: Decreased step clearance; Path deviations;Trunk sway increased  Distance (ft): 25 Feet (2x)  Assistive Device: Gait belt;Walker     PT Exercises  Exercise Treatment: performed     Safety Devices  Type of Devices: Left in chair;Chair alarm in place;Call light within reach;Nurse notified;Gait belt       Goals  Short Term Goals  Time Frame for Short term goals: 7 days (8/17/22) unless otherwise noted  Short term goal 1: Pt will perform bed mobility IND  -8/11 sba  Short term goal 2: Pt will perform transfers with SW and supervision  -8/11 cga  Short term goal 3: Pt will ambulate 25 ft with SW and supervision  -8/11  sba  Short term goal 4: pt will perform 2 steps with HR and min(A)  -8/11 [place]  Short term goal 5: Pt will perform BLE exercises 12-15 reps to improve strength for transfers by 8/13/22  -8/11 met  Patient Goals   Patient goals : \"to walk to the bathroom\" -8/11 met    Education  Patient Education  Education Given To: Patient  Education Provided: Role of Therapy;Plan of Care;Precautions;Transfer Training;Home Exercise Program;Fall Prevention Strategies  Education Method: Verbal  Barriers to Learning: None  Education Outcome: Verbalized understanding;Continued education needed    Therapy Time   Individual Concurrent Group Co-treatment   Time In 1305         Time Out 1345         Minutes 40         Timed Code Treatment Minutes: Humphrey Little

## 2022-08-11 NOTE — PROGRESS NOTES
Pt a/o. VSS. Shift assessment complete and charted. R lung sounds diminished and L side clear. Pt reports dry cough. Pt rates pain 5/10 in RLE. Pt states he is concerned with going home without help and worried about not being able to get around and such. Pt denied sob. Pt reports healthy appetite. VIRGIL pedal pulse present. RLE dsg c/d/I. Pt stable and denied needs when writer left.

## 2022-08-11 NOTE — PROGRESS NOTES
Department of Orthopedic Surgery  Physician Assistant   Progress Note    Subjective:       Systemic or Specific Complaints:No Complaints, had some aching pain in his ankle overnight, well controlled with pain meds.      Objective:     Patient Vitals for the past 24 hrs:   BP Temp Temp src Pulse Resp SpO2 Height Weight   08/11/22 0838 116/61 98.9 °F (37.2 °C) Oral 72 16 91 % -- --   08/11/22 0657 -- -- -- -- 16 -- -- --   08/11/22 0351 102/69 99.1 °F (37.3 °C) Oral 61 18 91 % -- --   08/11/22 0055 107/62 99.1 °F (37.3 °C) Oral 64 18 92 % -- --   08/10/22 2128 126/61 99.4 °F (37.4 °C) Oral 60 18 94 % -- --   08/10/22 1555 (!) 114/52 -- -- 71 -- -- -- --   08/10/22 1552 116/65 -- -- 63 -- -- -- --   08/10/22 1547 119/66 -- -- 61 -- 95 % -- --   08/10/22 1230 121/66 98.1 °F (36.7 °C) Oral 60 17 96 % 5' 6\" (1.676 m) 181 lb 10.5 oz (82.4 kg)   08/10/22 1210 123/72 98.9 °F (37.2 °C) Oral 64 18 95 % -- --       General: alert, appears stated age, cooperative, and no distress   Wound: No wounds apprecaited   Motion: Immobilized in affected extremity, split CDI   DVT Exam: No evidence of DVT seen on physical exam.     Additional exam: pt seen sitting up in bed at time of interview, right leg propped up on a pillow  Posterior splint applied appropriately, CDI  Right leg in neutral alignment  Able to wiggle exposed toes  Cap refill brisk, foot WWP  DP pulse 2+    Stirrup added to posterior splint for increased stability of distal fib fracture  Sensation intact after new splint application  Brisk cap refill    Data Review  CBC:   Lab Results   Component Value Date/Time    WBC 5.8 08/11/2022 05:44 AM    RBC 4.31 08/11/2022 05:44 AM    HGB 14.0 08/11/2022 05:44 AM    HCT 41.9 08/11/2022 05:44 AM     08/11/2022 05:44 AM       Renal:   Lab Results   Component Value Date/Time     08/11/2022 05:43 AM    K 4.2 08/11/2022 05:43 AM     08/11/2022 05:43 AM    CO2 29 08/11/2022 05:43 AM    BUN 21 08/11/2022 05:43 AM CREATININE 1.1 08/11/2022 05:43 AM    GLUCOSE 111 08/11/2022 05:43 AM    CALCIUM 9.2 08/11/2022 05:43 AM            Assessment:      right distal fibula fracture. Plan:      1:  NWB to right lower extremity, remain in splint at all times. Keep splint CDI. Ice and elevation to the RLE to reduce swelling  2:  Continue Deep venous thrombosis prophylaxis per primary  3:  Continue Pain Control- PRN tramadol, tylenol  4: PT/OT  5: OK to DC from ortho standpoint. Follow up with Dr Ema Reeves in 7-10 days.  DCP updated    Chaka Headley

## 2022-08-11 NOTE — PROGRESS NOTES
Occupational Therapy  Facility/Department: [facility] C3 TELE/MED SURG/ONC  Daily Treatment Note  NAME: Karen Mazariegos  : 1945  MRN: [id number]    Date of Service: 2022    Discharge Recommendations:  24 hour supervision or assist, Home with Home health OT  OT Equipment Recommendations  Equipment Needed: No (pt reports he has all needed DME previously recommended)      AM-PAC score  AM-PAC Inpatient Daily Activity Raw Score: 20 (22)  AM-PAC Inpatient ADL T-Scale Score : 42.03 (22)  ADL Inpatient CMS 0-100% Score: 38.32 (22)  ADL Inpatient CMS G-Code Modifier : Breanne Reagan (22)    Patient Diagnosis(es): The primary encounter diagnosis was COVID-19. Diagnoses of Closed head injury, initial encounter, Syncope and collapse, Orthostatic hypotension, and Closed fracture of right ankle, initial encounter were also pertinent to this visit. Assessment    Assessment: Pt tolerated OT session well, pt c/o feeling unsteady at times, denies dizziness, vitals stable throughout. Pt demos good ability to maintain NWB with transfers and mobility using SW, however demos difficulty maintaining NWB during standing ADL tasks such as clothing management and sink level grooming. Pt educated on completing tasks seated when necessary to protect RLE. DME discussed with pt, pt reports they have from his wife's prior hip surgeries. All questions answered during session. Pt continues to function below his baseline and would benefit from continued skilled OT in home setting to address functional deficits noted. Activity Tolerance: Patient tolerated treatment well  Discharge Recommendations: 24 hour supervision or assist;Home with Home health OT  Equipment Needed: No (pt reports he has all needed DME previously recommended)      Plan   Plan  Times per Week: 4-6     Restrictions  Restrictions/Precautions  Restrictions/Precautions: Weight Bearing; Fall Risk;Contact Precautions;Isolation  Lower Extremity Weight Bearing Restrictions  Right Lower Extremity Weight Bearing: Non Weight Bearing  Position Activity Restriction  Other position/activity restrictions: tele, NWB RLE, up with assist, Droplet +    Subjective   Subjective  Subjective: Pt resting in bed, agreeable to OT treatment. Pain: Pt reports 6/10 pain in RLE. Orientation  Overall Orientation Status: Within Normal Limits    Cognition  Safety Judgement: Decreased awareness of need for safety;Decreased awareness of need for assistance  Insights: Decreased awareness of deficits        Objective    Vitals  Vitals  Heart Rate: 72  Heart Rate Source: Monitor  BP: 129/75  BP Location: Right upper arm  BP Method: Automatic  Patient Position: Semi fowlers  MAP (Calculated): 93  SpO2: 92 %  O2 Device: None (Room air)    Bed Mobility Training  Bed Mobility Training: Yes  Supine to Sit: Stand-by assistance    Balance  Sitting: Intact  Standing: Impaired (SW, functional mobility to/from bathroom, toileting, sink ADLs, pt grossly CGA with occasional min A for balance when completing BADL tasks)  Standing - Static: Good  Standing - Dynamic: Fair    Transfer Training  Transfer Training: Yes ()  Interventions: Safety awareness training;Verbal cues  Sit to Stand: Contact-guard assistance  Stand to Sit: Contact-guard assistance  Toilet Transfer: Contact-guard assistance (with use of L grab bar)       ADL  Grooming: Contact guard assistance  Grooming Skilled Clinical Factors: at sink for hand washing, min A for balance  UE Dressing: Setup  UE Dressing Skilled Clinical Factors: to don tshirt  LE Dressing: Minimal assistance  LE Dressing Skilled Clinical Factors: to don L shoe, and shorts  Toileting: Contact guard assistance; Increased time to complete  Toileting Skilled Clinical Factors: Min A for balance in stance during clothing management  Additional Comments: Increased difficulty maintaining NWB during ADL tasks in stance        Safety Devices  Type of Devices: Left in

## 2022-08-11 NOTE — CARE COORDINATION
[facility]    Referral received from  to follow for home care services. I will follow for needs, and speak with patient to verify demos.     Boone Krause RN, BSN CTN  [facility] [phone]

## 2022-08-11 NOTE — CARE COORDINATION
Chart reviewed. D/c order noted. ECHO pending. Spoke with patient. Agreeable to [facility] any agency. Referral to [facility]. Poppy MONSIVAIS for [facility] order and completed [facility].  Dianne Moreno RN          CASE MANAGEMENT DISCHARGE SUMMARY      Discharge to: home with [address] ordered/agency: none, has standard walker    Transportation: private, wife will drive will get MIRANDA to assist as well if needed     Confirmed discharge plan with:     Patient: yes     Family:  yes he will notified when ECHO done     Facility/Agency, name:  [facility] faxed per [facility] when available        RN, name: Lonnie Wright RN

## 2022-08-12 ENCOUNTER — CARE COORDINATION (OUTPATIENT)
Dept: CASE MANAGEMENT | Age: 77
End: 2022-08-12

## 2022-08-12 NOTE — CARE COORDINATION
[facility] Transitions Initial Follow Up Call    Call within 2 business days of discharge: Yes    Patient: Jose Rodriguez Patient : 1945   MRN: [id number]  Reason for Admission: covid/syncope/ankle fx  Discharge Date: 22 RARS: Readmission Risk Score: 9.5      Last Discharge [facility]       Date Complaint Diagnosis Description Type Department Provider    8/10/22 Loss of Consciousness COVID-19 . .. ED to Hosp-Admission (Discharged) (ADMITTED) Concepcion Curry MD; Danielle Cosby... Spoke with: Jose Rodriguez and wife, [address]: [facility]    Non-face-to-face services provided:  Obtained and reviewed discharge summary and/or continuity of care documents  Communication with home health agencies or other community services the patient is currently [facility]    Care Transitions Follow Up Call    Needs to be reviewed by the provider   Additional needs identified to be addressed with provider: No  none             Method of communication with provider : none      Care Transition Nurse contacted the patient by telephone to follow up after admission. Verified name and  with patient as identifiers. Addressed changes since last contact: home health [address] [place] [address] scheduled today  medications-tramadol  Discussed follow-up appointments. If no appointment was previously scheduled, appointment scheduling offered: Yes. Is follow up appointment scheduled within 7 days of discharge? No.    Advance Care Planning:   Does patient have an Advance Directive: not on file. CTN reviewed discharge instructions, medical action plan and red flags with patient and discussed any barriers to care and/or understanding of plan of care after discharge. Discussed appropriate site of care based on symptoms and resources available to patient including: PCP  Specialist  Home health. The family agrees to contact the PCP office for questions related to their healthcare.      Patients top risk factors for readmission:  PMHc CAD s/p stent in 2008, HLD, and HTN who presented to [facility] ED for evaluation of Syncope and Fall  Interventions to address risk factors: Obtained and reviewed discharge summary and/or continuity of care documents and Communication with home health agencies or other community services the patient is currently using-[facility]      Non-[facility] follow up appointment(s): [facility] and has not scheduled f/u visit per wife    Wife answered call and placed on speaker. Patient stated he is having a difficult time getting around the house with broken ankle. Wife stated that she has to follow behind him to prevent him from falling. Patient has walker, but has to \"hop\" to prevent putting weight on his broken ankle. Medications reviewed and taking as directed. Patient has not needed tramadol yet this morning. Patient is keeping ankle elevated. Wife concerned about patient getting to and from car to MD appts. Patient to see orthopedic in 7-10 days. [facility] PT eval to be completed this afternoon per wife. CTN educated patient and wife that PT assist with strength, balance, and transfers. Educated that PT will assist with getting in and out of car as well. [facility] today. Denied any acute needs at present time. Educated on the use of urgent care or physicians 24 hr access line if assistance is needed after hours. CTN provided contact information for future needs. No further follow-up call indicated based on severity of symptoms and risk factors.         Care Transitions 24 Hour Call    Do you have a copy of your discharge instructions?: Yes  Do you have all of your prescriptions and are they filled?: Yes  Have you been contacted by a [address]?: No  Have you scheduled your follow up appointment?: No  Do you feel like you have everything you need to keep you well at home?: Yes  Are you an active caregiver in your home?: No  Care Transitions Interventions         Follow Up  No future appointments.     Rocael Butcher RN

## 2022-08-23 ENCOUNTER — OFFICE VISIT (OUTPATIENT)
Dept: ORTHOPEDIC SURGERY | Age: 77
End: 2022-08-23
Payer: MEDICARE

## 2022-08-23 VITALS — BODY MASS INDEX: 29.09 KG/M2 | WEIGHT: 181 LBS | HEIGHT: 66 IN

## 2022-08-23 DIAGNOSIS — M25.571 RIGHT ANKLE PAIN, UNSPECIFIED CHRONICITY: ICD-10-CM

## 2022-08-23 DIAGNOSIS — S82.841A ANKLE FRACTURE, BIMALLEOLAR, CLOSED, RIGHT, INITIAL ENCOUNTER: Primary | ICD-10-CM

## 2022-08-23 PROCEDURE — 1036F TOBACCO NON-USER: CPT | Performed by: ORTHOPAEDIC SURGERY

## 2022-08-23 PROCEDURE — L4386 NON-PNEUM WALK BOOT PRE CST: HCPCS | Performed by: ORTHOPAEDIC SURGERY

## 2022-08-23 PROCEDURE — 99204 OFFICE O/P NEW MOD 45 MIN: CPT | Performed by: ORTHOPAEDIC SURGERY

## 2022-08-23 PROCEDURE — 1123F ACP DISCUSS/DSCN MKR DOCD: CPT | Performed by: ORTHOPAEDIC SURGERY

## 2022-08-23 PROCEDURE — 1111F DSCHRG MED/CURRENT MED MERGE: CPT | Performed by: ORTHOPAEDIC SURGERY

## 2022-08-23 PROCEDURE — G8417 CALC BMI ABV UP PARAM F/U: HCPCS | Performed by: ORTHOPAEDIC SURGERY

## 2022-08-23 PROCEDURE — G8427 DOCREV CUR MEDS BY ELIG CLIN: HCPCS | Performed by: ORTHOPAEDIC SURGERY

## 2022-08-23 RX ORDER — FAMOTIDINE 20 MG/1
20 TABLET, FILM COATED ORAL 2 TIMES DAILY
COMMUNITY

## 2022-08-23 RX ORDER — PRAVASTATIN SODIUM 80 MG/1
80 TABLET ORAL DAILY
COMMUNITY

## 2022-08-23 RX ORDER — ASPIRIN 81 MG/1
81 TABLET ORAL DAILY
Status: ON HOLD | COMMUNITY
End: 2022-08-25 | Stop reason: SDUPTHER

## 2022-08-23 NOTE — H&P
ORTHOPAEDIC SURGERY HISTORY AND PHYSICAL NOTE    CHIEF COMPLAINT:  Right ankle fracture     HISTORY OF PRESENT ILLNESS:  75-year-old male presents for evaluation of a right ankle injury. He reports having a syncopal event on August 10, 2022. He presented to [facility] at that time. He was admitted to the hospital for work-up. He was noted to be COVID-positive. He feels as though this was likely related to dehydration as his work-up was negative for cardiac or neurologic issue. He reports being ambulatory. He noted that his ankle is swollen. This prompted an x-ray which demonstrated a distal fibula fracture. It has now been 2 weeks since his injury. His swelling has dissipated. He continues to have pain. He has been nonweightbearing on his right leg and in the splint. He denies numbness or tingling. He denies open wound. Past Medical History:   Diagnosis Date    Arthritis     joints    CAD (coronary artery disease) 2008    stents x2    Hyperlipidemia     Hypertension     Panic anxiety syndrome        Current Outpatient Medications   Medication Sig Dispense Refill    cetirizine (ZYRTEC) 10 MG tablet Take 20 mg by mouth in the morning and at bedtime      metoprolol succinate (TOPROL XL) 25 MG extended release tablet Take 1 tablet by mouth 2 times daily (Patient taking differently: Take 25 mg by mouth at bedtime) 30 tablet 3    pantoprazole (PROTONIX) 40 MG tablet Take 1 tablet by mouth every morning (before breakfast) 30 tablet 3    rosuvastatin (CRESTOR) 10 MG tablet Take 20 mg by mouth daily. aspirin 325 MG tablet Take 325 mg by mouth daily. doxepin (SINEQUAN) 100 MG capsule Take 150 mg by mouth nightly      fluticasone (FLONASE) 50 MCG/ACT nasal spray 2 sprays by Nasal route daily. No current facility-administered medications for this visit.         Past Surgical History:   Procedure Laterality Date    APPENDECTOMY      CHOLECYSTECTOMY      COLONOSCOPY  12/16/10 COLONOSCOPY  2015    Poylps    HERNIA REPAIR      bilateral inguinal    UPPER GASTROINTESTINAL ENDOSCOPY N/A 2021    EGD BIOPSY performed by Rusty Sands MD at [facility]   Allergen Reactions    Losartan Anaphylaxis    Atorvastatin      nausea    Lisinopril Other (See Comments)     cough    Prednisone Other (See Comments)     Insomnia, hyper, \"skin crawling\"       Family History   Problem Relation Age of Onset    Heart Disease Father        Social History     Socioeconomic History    Marital status:      Spouse name: Not on file    Number of children: Not on file    Years of education: Not on file    Highest education level: Not on file   Occupational History    Not on file   Tobacco Use    Smoking status: Former     Types: Cigarettes     Quit date: 1985     Years since quittin.7    Smokeless tobacco: Never   Vaping Use    Vaping Use: Never used   Substance and Sexual Activity    Alcohol use: No    Drug use: Never    Sexual activity: Yes     Partners: Female   Other Topics Concern    Not on file   Social History Narrative    Not on file     Social Determinants of Health     Financial Resource Strain: Not on file   Food Insecurity: Not on file   Transportation Needs: Not on file   Physical Activity: Not on file   Stress: Not on file   Social Connections: Not on file   Intimate Partner Violence: Not on file   Housing Stability: Not on file       Review of Systems please see today's intake form for complete review of systems. PHYSICAL EXAM  Gen: awake, alert, oriented  HEENT: atraumatic, normocephalic  Neck: supple  Resp: equal chest rise bilaterally, no audible wheezes, no accessory muscle use. CV: Lower extremities warm and well perfused. Palpable pulses with regular rate and rhythm. Abd: soft, nontender   Focused examination of the right ankle:  He presents in a well-padded short leg splint. This was removed in order to examine the skin.   There are no cuts, open wounds, or abrasions. There is moderate swelling over the lateral ankle, but the skin is able to wrinkle. There is no erythema or warmth. There is minor tenderness to palpation about the distal fibula. There is tenderness to palpation about the medial malleolus. Ankle range of motion was not assessed today. Sensation is intact to light touch in deep peroneal, superficial peroneal, tibial, sural, and saphenous nerve distributions. Motor function is intact to EHL, FHL, tibialis anterior, and gastroc. There is brisk capillary refill to the toes and a strong palpable dorsalis pedis pulse. Compartments are soft and compressible. There is no calf tenderness and a negative Homans' sign. RADIOGRAPHIC EXAM:  4 views of the right ankle including AP, mortise, lateral, and gravity stress x-rays demonstrate an oblique distal fibula fracture, [place] B. There is obvious widening of the medial clear space with gravity stress x-ray to 9 mm of medial clear space. There is no subluxation on lateral view, there is subluxation with gravity stress view on mortise projection. ASSESSEMENT AND PLAN    76 y.o. male with the following orthopaedic surgery problems:    Right bimalleolar equivalent ankle fracture    I reviewed the x-rays in detail with him. I reviewed that he has an unstable ankle fracture, bimalleolar equivalent. I recommended surgical intervention with open reduction internal fixation. I discussed the risks, benefits, and alternatives to surgical intervention. A standard postoperative course was described. I reviewed that fixation of the syndesmosis and/or deltoid ligament would be an intraoperative decision. In the interim, he will be nonweightbearing to his right lower extremity. He was transitioned to a cam boot today. He will use ice and elevation to limit swelling. Tentatively, he will be scheduled at [facility] ambulatory surgery center on Thursday, 8/25/2022.   He will be n.p.o. at

## 2022-08-23 NOTE — LETTER
[address]   [address] [place]     TODAY'S DATE: 22    PATIENT'S NAME: Brandt Iraheta    PATIENT'S NUMBER: [id number]    : 1945    PREFERRED PHONE NUMBER: [phone]      WORK PHONE NUMBER: There is no work phone number on file.          DIAGNOSIS:   RIGHT ANKLE BIMALLEOLAR EQUIVALENT    DATE OF SURGERY: 22    PROCEDURE: OPEN REDUCTION INTERNAL FIXATION OF RIGHT BIMALLEOLAR ANKLE FRACTURE    SURGEON: Dr. Carmencita Fernandes: [facility]    HOSPITAL: [facility]: Outpatient/Same Day Surgery    ANESTHESIA: General   Pre-Op Block requested  Yes    LENGTH OF SURGERY: 1 hour    EQUIPMENT REQUESTED: Arthrex ankle set      X-RAYS REQUIRED: C-ARM      PCP: Ximena Bradley MD    H&P TO BE DONE BY THE PCP      CARDIAC CLEARANCE NEEDED: No     ALLERGIES:   Allergies   Allergen Reactions    Losartan Anaphylaxis    Atorvastatin      nausea    Lisinopril Other (See Comments)     cough    Prednisone Other (See Comments)     Insomnia, hyper, \"skin crawling\"       DME: Cast Boot    POST-OP VISIT: 7 Days    OTHER INSTRUCTIONS/REMARKS: N/A    INSURANCE INFORMATION: _________________________ CARD IN MEDIA IN EPIC___  CARD FAXED___    PRE-CERTIFICATION REQUIRED: YES   NO   PER _______________________    SURGERY SCHEDULED BY: ____________________________________    PATIENT CALLED AND CONFIRMED DATE & TIME: ______________________

## 2022-08-23 NOTE — PROGRESS NOTES
Karen Lofts    Age 76 y.o.    male    1945    N [id number]    8/25/2022  Arrival Time_____________  OR Time____________128 Min     Procedure(s):  OPEN REDUCTION INTERNAL FIXATION RIGHT BIMALLEOLAR ANKLE FRACTURE -BLOCK                      General    Surgeon(s):  Zoltan Doom, MD       Phone [phone] ([place]oyd)     [address]  Cell         Work  _____________________________________________________________________  _____________________________________________________________________  _____________________________________________________________________  _____________________________________________________________________  _____________________________________________________________________    PCP _____________________________ Phone_________________     H&P__________________Bringing      Chart            Epic   DOS      Called________  EKG__________________Bringing      Chart            Epic   DOS      Called________  LAB__________________ Bringing      Chart            Epic   DOS      Called________  Cardiac Clearance_______Bringing      Chart            Epic      DOS      Called________    Cardiologist________________________ Phone___________________________    ? Worship concerns / Waiver on Chart            PAT Communications________________  ? Pre-op Instructions Given [place]          _________________________________  ? Directions to Surgery Center                          _________________________________  ? Transportation Home_______________      __________________________________  ?  Crutches/Walker__________________        __________________________________    ________Pre-op Orders   _______Transcribed    _______Wt.  ________Pharmacy          _______SCD  ______VTE     ______TED Idelia Pill  _______  Surgery Consent    _______  Anesthesia Consent         COVID DATE______________LOCATION________________ RESULT__________

## 2022-08-23 NOTE — H&P (VIEW-ONLY)
ORTHOPAEDIC SURGERY HISTORY AND PHYSICAL NOTE    CHIEF COMPLAINT:  Right ankle fracture     HISTORY OF PRESENT ILLNESS:  53-year-old male presents for evaluation of a right ankle injury. He reports having a syncopal event on August 10, 2022. He presented to [facility] at that time. He was admitted to the hospital for work-up. He was noted to be COVID-positive. He feels as though this was likely related to dehydration as his work-up was negative for cardiac or neurologic issue. He reports being ambulatory. He noted that his ankle is swollen. This prompted an x-ray which demonstrated a distal fibula fracture. It has now been 2 weeks since his injury. His swelling has dissipated. He continues to have pain. He has been nonweightbearing on his right leg and in the splint. He denies numbness or tingling. He denies open wound. Past Medical History:   Diagnosis Date    Arthritis     joints    CAD (coronary artery disease) 2008    stents x2    Hyperlipidemia     Hypertension     Panic anxiety syndrome        Current Outpatient Medications   Medication Sig Dispense Refill    cetirizine (ZYRTEC) 10 MG tablet Take 20 mg by mouth in the morning and at bedtime      metoprolol succinate (TOPROL XL) 25 MG extended release tablet Take 1 tablet by mouth 2 times daily (Patient taking differently: Take 25 mg by mouth at bedtime) 30 tablet 3    pantoprazole (PROTONIX) 40 MG tablet Take 1 tablet by mouth every morning (before breakfast) 30 tablet 3    rosuvastatin (CRESTOR) 10 MG tablet Take 20 mg by mouth daily. aspirin 325 MG tablet Take 325 mg by mouth daily. doxepin (SINEQUAN) 100 MG capsule Take 150 mg by mouth nightly      fluticasone (FLONASE) 50 MCG/ACT nasal spray 2 sprays by Nasal route daily. No current facility-administered medications for this visit.         Past Surgical History:   Procedure Laterality Date    APPENDECTOMY      CHOLECYSTECTOMY      COLONOSCOPY  12/16/10 open wounds, or abrasions. There is moderate swelling over the lateral ankle, but the skin is able to wrinkle. There is no erythema or warmth. There is minor tenderness to palpation about the distal fibula. There is tenderness to palpation about the medial malleolus. Ankle range of motion was not assessed today. Sensation is intact to light touch in deep peroneal, superficial peroneal, tibial, sural, and saphenous nerve distributions. Motor function is intact to EHL, FHL, tibialis anterior, and gastroc. There is brisk capillary refill to the toes and a strong palpable dorsalis pedis pulse. Compartments are soft and compressible. There is no calf tenderness and a negative Homans' sign. RADIOGRAPHIC EXAM:  4 views of the right ankle including AP, mortise, lateral, and gravity stress x-rays demonstrate an oblique distal fibula fracture, Font[place] B. There is obvious widening of the medial clear space with gravity stress x-ray to 9 mm of medial clear space. There is no subluxation on lateral view, there is subluxation with gravity stress view on mortise projection. ASSESSEMENT AND PLAN    76 y.o. male with the following orthopaedic surgery problems:    Right bimalleolar equivalent ankle fracture    I reviewed the x-rays in detail with him. I reviewed that he has an unstable ankle fracture, bimalleolar equivalent. I recommended surgical intervention with open reduction internal fixation. I discussed the risks, benefits, and alternatives to surgical intervention. A standard postoperative course was described. I reviewed that fixation of the syndesmosis and/or deltoid ligament would be an intraoperative decision. In the interim, he will be nonweightbearing to his right lower extremity. He was transitioned to a cam boot today. He will use ice and elevation to limit swelling. Tentatively, he will be scheduled at [facility] ambulatory surgery center on Thursday, 8/25/2022.   He will be n.p.o. at midnight in preparation for surgery. He will receive IV Ancef for antibiotic prophylaxis. All questions answered to the best my ability.     Antonella Whelan MD

## 2022-08-24 ENCOUNTER — ANESTHESIA EVENT (OUTPATIENT)
Dept: OPERATING ROOM | Age: 77
End: 2022-08-24
Payer: MEDICARE

## 2022-08-25 ENCOUNTER — HOSPITAL ENCOUNTER (OUTPATIENT)
Age: 77
Setting detail: OUTPATIENT SURGERY
Discharge: HOME | End: 2022-08-25
Attending: ORTHOPAEDIC SURGERY | Admitting: ORTHOPAEDIC SURGERY
Payer: MEDICARE

## 2022-08-25 ENCOUNTER — APPOINTMENT (OUTPATIENT)
Dept: GENERAL RADIOLOGY | Age: 77
End: 2022-08-25
Attending: ORTHOPAEDIC SURGERY
Payer: MEDICARE

## 2022-08-25 ENCOUNTER — ANESTHESIA (OUTPATIENT)
Dept: OPERATING ROOM | Age: 77
End: 2022-08-25
Payer: MEDICARE

## 2022-08-25 VITALS
DIASTOLIC BLOOD PRESSURE: 59 MMHG | HEART RATE: 71 BPM | HEIGHT: 66 IN | SYSTOLIC BLOOD PRESSURE: 113 MMHG | WEIGHT: 175 LBS | BODY MASS INDEX: 28.12 KG/M2 | TEMPERATURE: 97.2 F | RESPIRATION RATE: 19 BRPM | OXYGEN SATURATION: 95 %

## 2022-08-25 LAB
GLUCOSE BLD-MCNC: 94 MG/DL (ref 70–99)
PERFORMED ON: NORMAL

## 2022-08-25 PROCEDURE — 3600000004 HC SURGERY LEVEL 4 BASE: Performed by: ORTHOPAEDIC SURGERY

## 2022-08-25 PROCEDURE — 64445 NJX AA&/STRD SCIATIC NRV IMG: CPT | Performed by: ANESTHESIOLOGY

## 2022-08-25 PROCEDURE — 73600 X-RAY EXAM OF ANKLE: CPT

## 2022-08-25 PROCEDURE — 6360000002 HC RX W HCPCS

## 2022-08-25 PROCEDURE — 2780000010 HC IMPLANT OTHER: Performed by: ORTHOPAEDIC SURGERY

## 2022-08-25 PROCEDURE — 7100000000 HC PACU RECOVERY - FIRST 15 MIN: Performed by: ORTHOPAEDIC SURGERY

## 2022-08-25 PROCEDURE — 7100000011 HC PHASE II RECOVERY - ADDTL 15 MIN: Performed by: ORTHOPAEDIC SURGERY

## 2022-08-25 PROCEDURE — 2580000003 HC RX 258: Performed by: ORTHOPAEDIC SURGERY

## 2022-08-25 PROCEDURE — C1713 ANCHOR/SCREW BN/BN,TIS/BN: HCPCS | Performed by: ORTHOPAEDIC SURGERY

## 2022-08-25 PROCEDURE — 2580000003 HC RX 258: Performed by: ANESTHESIOLOGY

## 2022-08-25 PROCEDURE — 6360000002 HC RX W HCPCS: Performed by: ANESTHESIOLOGY

## 2022-08-25 PROCEDURE — 3700000000 HC ANESTHESIA ATTENDED CARE: Performed by: ORTHOPAEDIC SURGERY

## 2022-08-25 PROCEDURE — 2500000003 HC RX 250 WO HCPCS: Performed by: NURSE ANESTHETIST, CERTIFIED REGISTERED

## 2022-08-25 PROCEDURE — 7100000010 HC PHASE II RECOVERY - FIRST 15 MIN: Performed by: ORTHOPAEDIC SURGERY

## 2022-08-25 PROCEDURE — 3700000001 HC ADD 15 MINUTES (ANESTHESIA): Performed by: ORTHOPAEDIC SURGERY

## 2022-08-25 PROCEDURE — 2709999900 HC NON-CHARGEABLE SUPPLY: Performed by: ORTHOPAEDIC SURGERY

## 2022-08-25 PROCEDURE — 6360000002 HC RX W HCPCS: Performed by: ORTHOPAEDIC SURGERY

## 2022-08-25 PROCEDURE — 3600000014 HC SURGERY LEVEL 4 ADDTL 15MIN: Performed by: ORTHOPAEDIC SURGERY

## 2022-08-25 PROCEDURE — 2500000003 HC RX 250 WO HCPCS: Performed by: ANESTHESIOLOGY

## 2022-08-25 PROCEDURE — 6360000002 HC RX W HCPCS: Performed by: NURSE ANESTHETIST, CERTIFIED REGISTERED

## 2022-08-25 PROCEDURE — A4217 STERILE WATER/SALINE, 500 ML: HCPCS | Performed by: ORTHOPAEDIC SURGERY

## 2022-08-25 PROCEDURE — 27792 TREATMENT OF ANKLE FRACTURE: CPT | Performed by: ORTHOPAEDIC SURGERY

## 2022-08-25 PROCEDURE — 3209999900 FLUORO FOR SURGICAL PROCEDURES

## 2022-08-25 PROCEDURE — 27829 TREAT LOWER LEG JOINT: CPT | Performed by: ORTHOPAEDIC SURGERY

## 2022-08-25 PROCEDURE — 7100000001 HC PACU RECOVERY - ADDTL 15 MIN: Performed by: ORTHOPAEDIC SURGERY

## 2022-08-25 DEVICE — PLATE BNE 8 H R DST FIB S STL LOK FOR ANK FRAC MGMT: Type: IMPLANTABLE DEVICE | Site: ANKLE | Status: FUNCTIONAL

## 2022-08-25 DEVICE — SCREW BNE L14MM DIA3.5MM CORT ANK S STL NONLOCKING LO PROF: Type: IMPLANTABLE DEVICE | Site: ANKLE | Status: FUNCTIONAL

## 2022-08-25 DEVICE — SYSTEM IMPL S STL KNOTLESS SYNDESMOSIS TIGHTROPE XP: Type: IMPLANTABLE DEVICE | Site: ANKLE | Status: FUNCTIONAL

## 2022-08-25 RX ORDER — ROPIVACAINE HYDROCHLORIDE 5 MG/ML
INJECTION, SOLUTION EPIDURAL; INFILTRATION; PERINEURAL
Status: COMPLETED | OUTPATIENT
Start: 2022-08-25 | End: 2022-08-25

## 2022-08-25 RX ORDER — OXYCODONE HYDROCHLORIDE 5 MG/1
5 TABLET ORAL
Status: DISCONTINUED | OUTPATIENT
Start: 2022-08-25 | End: 2022-08-25 | Stop reason: HOSPADM

## 2022-08-25 RX ORDER — SODIUM CHLORIDE 9 MG/ML
INJECTION, SOLUTION INTRAVENOUS PRN
Status: DISCONTINUED | OUTPATIENT
Start: 2022-08-25 | End: 2022-08-25 | Stop reason: HOSPADM

## 2022-08-25 RX ORDER — SODIUM CHLORIDE 0.9 % (FLUSH) 0.9 %
5-40 SYRINGE (ML) INJECTION EVERY 12 HOURS SCHEDULED
Status: DISCONTINUED | OUTPATIENT
Start: 2022-08-25 | End: 2022-08-25 | Stop reason: HOSPADM

## 2022-08-25 RX ORDER — MAGNESIUM HYDROXIDE 1200 MG/15ML
LIQUID ORAL CONTINUOUS PRN
Status: COMPLETED | OUTPATIENT
Start: 2022-08-25 | End: 2022-08-25

## 2022-08-25 RX ORDER — HYDRALAZINE HYDROCHLORIDE 20 MG/ML
10 INJECTION INTRAMUSCULAR; INTRAVENOUS
Status: DISCONTINUED | OUTPATIENT
Start: 2022-08-25 | End: 2022-08-25 | Stop reason: HOSPADM

## 2022-08-25 RX ORDER — DEXAMETHASONE SODIUM PHOSPHATE 10 MG/ML
4 INJECTION INTRAMUSCULAR; INTRAVENOUS
Status: DISCONTINUED | OUTPATIENT
Start: 2022-08-25 | End: 2022-08-25 | Stop reason: HOSPADM

## 2022-08-25 RX ORDER — ASPIRIN 81 MG/1
81 TABLET ORAL 2 TIMES DAILY
Qty: 30 TABLET | Refills: 3 | Status: SHIPPED | OUTPATIENT
Start: 2022-08-25

## 2022-08-25 RX ORDER — SODIUM CHLORIDE 0.9 % (FLUSH) 0.9 %
5-40 SYRINGE (ML) INJECTION PRN
Status: DISCONTINUED | OUTPATIENT
Start: 2022-08-25 | End: 2022-08-25 | Stop reason: HOSPADM

## 2022-08-25 RX ORDER — ONDANSETRON 2 MG/ML
INJECTION INTRAMUSCULAR; INTRAVENOUS PRN
Status: DISCONTINUED | OUTPATIENT
Start: 2022-08-25 | End: 2022-08-25 | Stop reason: SDUPTHER

## 2022-08-25 RX ORDER — DIPHENHYDRAMINE HYDROCHLORIDE 50 MG/ML
12.5 INJECTION INTRAMUSCULAR; INTRAVENOUS
Status: DISCONTINUED | OUTPATIENT
Start: 2022-08-25 | End: 2022-08-25 | Stop reason: HOSPADM

## 2022-08-25 RX ORDER — PHENYLEPHRINE HCL IN 0.9% NACL 1 MG/10 ML
SYRINGE (ML) INTRAVENOUS PRN
Status: DISCONTINUED | OUTPATIENT
Start: 2022-08-25 | End: 2022-08-25 | Stop reason: SDUPTHER

## 2022-08-25 RX ORDER — SODIUM CHLORIDE, SODIUM LACTATE, POTASSIUM CHLORIDE, CALCIUM CHLORIDE 600; 310; 30; 20 MG/100ML; MG/100ML; MG/100ML; MG/100ML
INJECTION, SOLUTION INTRAVENOUS CONTINUOUS
Status: DISCONTINUED | OUTPATIENT
Start: 2022-08-25 | End: 2022-08-25 | Stop reason: HOSPADM

## 2022-08-25 RX ORDER — LIDOCAINE HYDROCHLORIDE 10 MG/ML
1 INJECTION, SOLUTION EPIDURAL; INFILTRATION; INTRACAUDAL; PERINEURAL
Status: COMPLETED | OUTPATIENT
Start: 2022-08-25 | End: 2022-08-25

## 2022-08-25 RX ORDER — IPRATROPIUM BROMIDE AND ALBUTEROL SULFATE 2.5; .5 MG/3ML; MG/3ML
1 SOLUTION RESPIRATORY (INHALATION)
Status: DISCONTINUED | OUTPATIENT
Start: 2022-08-25 | End: 2022-08-25 | Stop reason: HOSPADM

## 2022-08-25 RX ORDER — ONDANSETRON 2 MG/ML
4 INJECTION INTRAMUSCULAR; INTRAVENOUS
Status: DISCONTINUED | OUTPATIENT
Start: 2022-08-25 | End: 2022-08-25 | Stop reason: HOSPADM

## 2022-08-25 RX ORDER — MEPERIDINE HYDROCHLORIDE 50 MG/ML
12.5 INJECTION INTRAMUSCULAR; INTRAVENOUS; SUBCUTANEOUS EVERY 5 MIN PRN
Status: DISCONTINUED | OUTPATIENT
Start: 2022-08-25 | End: 2022-08-25 | Stop reason: HOSPADM

## 2022-08-25 RX ORDER — PROPOFOL 10 MG/ML
INJECTION, EMULSION INTRAVENOUS PRN
Status: DISCONTINUED | OUTPATIENT
Start: 2022-08-25 | End: 2022-08-25 | Stop reason: SDUPTHER

## 2022-08-25 RX ORDER — MIDAZOLAM HYDROCHLORIDE 1 MG/ML
2 INJECTION INTRAMUSCULAR; INTRAVENOUS
Status: DISCONTINUED | OUTPATIENT
Start: 2022-08-25 | End: 2022-08-25 | Stop reason: HOSPADM

## 2022-08-25 RX ORDER — ACETAMINOPHEN 325 MG/1
650 TABLET ORAL
Status: DISCONTINUED | OUTPATIENT
Start: 2022-08-25 | End: 2022-08-25 | Stop reason: HOSPADM

## 2022-08-25 RX ADMIN — HYDROMORPHONE HYDROCHLORIDE 0.5 MG: 1 INJECTION, SOLUTION INTRAMUSCULAR; INTRAVENOUS; SUBCUTANEOUS at 12:35

## 2022-08-25 RX ADMIN — Medication 0.5 MG: at 12:35

## 2022-08-25 RX ADMIN — SODIUM CHLORIDE, POTASSIUM CHLORIDE, SODIUM LACTATE AND CALCIUM CHLORIDE: 600; 310; 30; 20 INJECTION, SOLUTION INTRAVENOUS at 08:14

## 2022-08-25 RX ADMIN — CEFAZOLIN 2000 MG: 10 INJECTION, POWDER, FOR SOLUTION INTRAVENOUS at 10:25

## 2022-08-25 RX ADMIN — Medication 50 MCG: at 10:58

## 2022-08-25 RX ADMIN — SODIUM CHLORIDE, POTASSIUM CHLORIDE, SODIUM LACTATE AND CALCIUM CHLORIDE: 600; 310; 30; 20 INJECTION, SOLUTION INTRAVENOUS at 11:47

## 2022-08-25 RX ADMIN — ONDANSETRON 4 MG: 2 INJECTION INTRAMUSCULAR; INTRAVENOUS at 12:06

## 2022-08-25 RX ADMIN — PROPOFOL 140 MG: 10 INJECTION, EMULSION INTRAVENOUS at 10:20

## 2022-08-25 RX ADMIN — ROPIVACAINE HYDROCHLORIDE 30 ML: 5 INJECTION EPIDURAL; INFILTRATION; PERINEURAL at 08:30

## 2022-08-25 RX ADMIN — LIDOCAINE HYDROCHLORIDE 3 ML: 10 INJECTION, SOLUTION EPIDURAL; INFILTRATION; INTRACAUDAL; PERINEURAL at 10:20

## 2022-08-25 RX ADMIN — Medication 50 MCG: at 10:49

## 2022-08-25 ASSESSMENT — PAIN DESCRIPTION - LOCATION: LOCATION: ANKLE

## 2022-08-25 ASSESSMENT — PAIN SCALES - GENERAL
PAINLEVEL_OUTOF10: 0
PAINLEVEL_OUTOF10: 7
PAINLEVEL_OUTOF10: 4
PAINLEVEL_OUTOF10: 0
PAINLEVEL_OUTOF10: 7
PAINLEVEL_OUTOF10: 7
PAINLEVEL_OUTOF10: 5
PAINLEVEL_OUTOF10: 5

## 2022-08-25 ASSESSMENT — PAIN DESCRIPTION - DESCRIPTORS: DESCRIPTORS: DULL

## 2022-08-25 ASSESSMENT — PAIN DESCRIPTION - ORIENTATION: ORIENTATION: INNER;OUTER

## 2022-08-25 ASSESSMENT — PAIN - FUNCTIONAL ASSESSMENT: PAIN_FUNCTIONAL_ASSESSMENT: 0-10

## 2022-08-25 NOTE — ANESTHESIA POSTPROCEDURE EVALUATION
Department of Anesthesiology  Postprocedure Note    Patient: Perla Nieves  MRN: [id number]  YOB: 1945  Date of evaluation: 8/25/2022      Procedure Summary     Date: 08/25/22 Room / Location: [address] [place] [address]    Anesthesia Start: 4952 Anesthesia Stop: 9300    Procedure: OPEN REDUCTION INTERNAL FIXATION RIGHT BIMALLEOLAR ANKLE FRACTURE -BLOCK (Right: Ankle) Diagnosis:       Bimalleolar ankle fracture, right, closed, initial encounter      (Bimalleolar ankle fracture, right)    Surgeons: Charmaine Wei MD Responsible Provider: Margie Lamar MD    Anesthesia Type: general ASA Status: 3          Anesthesia Type: No value filed.     Mackenzie Phase I: Mackenzie Score: 9    Mackenzie Phase II:        Anesthesia Post Evaluation    Patient location during evaluation: PACU  Patient participation: complete - patient participated  Level of consciousness: awake and alert  Pain score: 2  Airway patency: patent  Nausea & Vomiting: no nausea  Complications: no  Cardiovascular status: blood pressure returned to baseline  Respiratory status: acceptable  Hydration status: euvolemic  Multimodal analgesia pain management approach

## 2022-08-25 NOTE — DISCHARGE INSTRUCTIONS
Discharge instructions  No weightbearing to the right foot. Use pain as a guide to activity. Use ice to limit swelling. Elevate the leg at night to limit swelling. Take pain medications as prescribed. Contact Dr. Kemi Rush office if you need a refill. Leave dressing in place x 72 hours postop. Apply new mepilex dressing. May change sooner if saturation. May shower without dressing in place. Allow water to run over incision sites. No tub baths. Do not submerge. Recommend taking aspirin 81 mg twice daily for 6 weeks to prevent blood clots. Look out for worsening pain, increasing redness, fevers/chills, numbness, chest pain, shortness of breath. Call the office at [phone] if you have questions/concerns. Can also call/text Dr. Shantanu Starks at [phone], his personal cell phone number. Followup in 1 weeks as scheduled for wound check. ANESTHESIA DISCHARGE INSTRUCTIONS    You are under the influence of drugs- do not drink alcohol, drive a car, operate machinery(such as power tools, kitchen appliances, etc), sign legal documents, or make any important decisions for 24 hours (or while on pain medications). Children should not ride bikes or [place] or play on gym sets  for 24 hours after surgery. A responsible adult should be with you for 24 hours. Rest at home today- increase activity as tolerated. Progress slowly to a regular diet unless your physician has instructed you otherwise. Drink plenty of water. CALL YOUR DOCTOR IF YOU:  Have moderate to severe nausea or vomiting AND are unable to hold down fluids or prescribed medications. Have bright red bloody drainage from your dressing that won't stop oozing.   Do not get relief with your pain medication    NORMAL (POSSIBLE) SIDE EFFECTS FROM ANESTHESIA:     Confusion, temporary memory loss, delayed reaction times in the first 24 hours  Lightheadedness, dizziness, difficulty focusing, blurred vision  Nausea/vomiting can happen  Shivering, feeling cold, sore throat, cough and muscle aches should stop within 24-48 hours  Trouble urinating - call your surgeon if it has been more than 8 hrs  Bruising or soreness at the IV site - call if it remains red, firm or there is drainage             FEMALES OF CHILDBEARING AGE WHO ARE TAKING BIRTH CONTROL PILLS:  You may have received a medication during your procedure that interferes with the   actions of birth control pills (Bridion or Emend). Use some other kind of birth control in addition to your pills, like a condom, for 1 month after your procedure to prevent unwanted pregnancy. The following instructions are to be followed if you have a known history or diagnosis of sleep apnea: For all sleep apnea patients:  ? Sleep on your side or sitting up in a chair whenever possible, especially the first 24 hours after surgery. ? Use only medicines prescribed by your doctor. ? Do not drink alcohol. ? If you have a dental device to assist you while at rest, use it at all times for the first 24 hours. For patients using CPAP machines:  ? Use your CPAP machine during all periods of sleep as usual.  ? Use your CPAP machine during all periods of daytime rest while on pain medicines. ** Follow up with your primary care doctor for continued care. IF YOU DO NOT TAKE ALL OF YOUR NARCOTIC PAIN MEDICATION, please dispose of them responsibly. There are drop off boxes in the Emergency Departments 24/7 at both [facility] and [place]. If these locations are not convenient, other options for discarding them can be found at:  [email or web address]    Hospital or office staff may NOT accept any medications to drop off in the cabinet for you. PERIPHERAL NERVE BLOCK INSTRUCTIONS     Please remember while having a nerve block you are at an increased risk for [address] You were given a nerve block today from the anesthesiologist. Most nerve blocks last anywhere from 6-36 hours.   You should start taking your pain medication before the block wears off or when you first begin feeling discomfort. It takes at least 30-60 minutes for a pain pill to take effect. Pain medications should be taken with food. Consider setting an alarm through the night to help manage your pain level so you do not wake up with too much pain. Pain medicines can cause more sedation and decrease your breathing so ONLY take as directed. If you have Sleep Apnea, you definitely need to use your C-Pap machine. What to expect after a nerve block:   Numbness, tingling- arm or leg feels heavy or asleep  Weakness or inability to move or control your arm or leg   Inability to feel temperature changes to your arm or leg  Usually the weakness wears off first, followed by the tingling or heaviness. You may notice more pain at this point and should start taking your pain meds. If you had a shoulder block, you may experience:  Mild shortness of breath (may be relieved by sitting up in a chair or recliner)  Hoarse voice  Blurry vision  Unequal pupils  Drooping of your face (eye or lip) on the same side as the nerve block  Swelling at the injection site on the side of your neck  These side effects should resolve as the block wears off! IF you have severe or prolonged shortness of breath-  GO to the nearest Emergency Room! If you had a nerve block of your arm or leg:  Protect the arm or leg from extreme hot or cold temperatures  Protect the arm or leg from obstructing blood flow by frequent position changes- Make sure fingers/ toes stay pink and warm. Call surgeon with any changes  For leg block, get assistance walking until the block wears off, ie:  crutches, walker, support person   If you continue to feel the effects of the nerve block for longer than 72 hours- call [facility] at [phone] and ask to speak with the Anesthesiologist on call. What is a Surgical Site Infection or  (SSI)?         A surgical site infection (SSI) is an infection that occurs after surgery in the part of the body where the surgery took place. Most patients who have surgery do not develop an infection. However, infections can develop in about 1-3 cases for every 100 patients who have had surgery. Our goal is for you to NOT experience any complications and be completely satisfied with your care! However, some signs or symptoms to look for and report immediately to your doctor are:   1. Fever above 101 degrees    2. Redness and increasing pain around the area  where you had surgery   3. Drainage of cloudy fluid or pus coming from the surgical area    Some of the things we/ you can do to prevent SSI's are:   1. Clean hands with soap and water or an alcohol-based hand rub before and after caring for the operative area. This occurs the day of surgery and for the next 2 weeks. 2.Sometimes you receive an appropriate antibiotic within 60 minutes before your surgery or take one for several days after surgery depending on your surgeon's instructions and/or the type of surgery you are having. 3. Family and/or friends who visit you should NOT touch the surgical wound or dressings until advised by your surgeon. 4. Be sure to elevate and decrease the swelling after your surgery to help prevent infection. 5. If you are a diabetic, you need to closely monitor your blood sugar levels and report any significant increases or changes to your surgeon to help promote the healing process.

## 2022-08-25 NOTE — ANESTHESIA PRE PROCEDURE
Department of Anesthesiology  Preprocedure Note       Name:  Link Preston   Age:  76 y.o.  :  1945                                          MRN:  [id number]         Date:  2022      Surgeon: Niraj Laureano):  Omid Andres MD    Procedure: Procedure(s):  OPEN REDUCTION INTERNAL FIXATION RIGHT BIMALLEOLAR ANKLE FRACTURE -BLOCK    Medications prior to admission:   Prior to Admission medications    Medication Sig Start Date End Date Taking?  Authorizing Provider   aspirin 81 MG EC tablet Take 81 mg by mouth daily   Yes Historical Provider, MD   famotidine (PEPCID) 20 MG tablet Take 20 mg by mouth 2 times daily   Yes Historical Provider, MD   pravastatin (PRAVACHOL) 80 MG tablet Take 80 mg by mouth daily   Yes Historical Provider, MD   cetirizine (ZYRTEC) 10 MG tablet Take 20 mg by mouth in the morning and at bedtime    Historical Provider, MD   metoprolol succinate (TOPROL XL) 25 MG extended release tablet Take 1 tablet by mouth 2 times daily  Patient taking differently: Take 25 mg by mouth at bedtime 21   NATE Puentes - CNP   doxepin (SINEQUAN) 100 MG capsule Take 100 mg by mouth nightly    Historical Provider, MD       Current medications:    Current Facility-Administered Medications   Medication Dose Route Frequency Provider Last Rate Last Admin    ceFAZolin (ANCEF) 2000 mg in dextrose 5 % 100 mL IVPB  2,000 mg IntraVENous On Call to  [facility]        lidocaine PF 1 % injection 1 mL  1 mL IntraDERmal Once PRN Francheska Gonzalez MD        lactated ringers infusion   IntraVENous Continuous Francheska Gonzalez MD        sodium chloride flush 0.9 % injection 5-40 mL  5-40 mL IntraVENous 2 times per day Francheska Gonzalez MD        sodium chloride flush 0.9 % injection 5-40 mL  5-40 mL IntraVENous PRN Francheska Gonzalez MD        0.9 % sodium chloride infusion   IntraVENous PRN Francheska Gonzalez MD        sodium chloride flush 0.9 % injection 5-40 mL  5-40 mL IntraVENous 2 times per day Charmaine Wei MD        sodium chloride flush 0.9 % injection 5-40 mL  5-40 mL IntraVENous PRN Charmaine Wei MD        0.9 % sodium chloride infusion   IntraVENous PRN Charmaine Wei MD        ceFAZolin (ANCEF) 2000 mg in dextrose 5 % 100 mL IVPB  2,000 mg IntraVENous On Call to [address] MD           Allergies: Allergies   Allergen Reactions    Losartan Anaphylaxis    Atorvastatin      nausea    Lisinopril Other (See Comments)     cough    Prednisone Other (See Comments)     Insomnia, hyper, \"skin crawling\"       Problem List:    Patient Active Problem List   Diagnosis Code    Chest pain R07.9    CAD (coronary artery disease) I25.10    Abnormal EKG R94.31    Hypertension I10    Syncope and collapse R55       Past Medical History:        Diagnosis Date    Arthritis     joints    CAD (coronary artery disease) 2008    stents x2    Diabetes mellitus ([facility] [address]     diet controlled    Hyperlipidemia     Hypertension     Panic anxiety syndrome     Sleep apnea     never returned for appliance       Past Surgical History:        Procedure Laterality Date    APPENDECTOMY      CARDIAC SURGERY      2 stents ~     CHOLECYSTECTOMY      COLONOSCOPY  2010    COLONOSCOPY  2015    Poylps    HERNIA REPAIR      bilateral inguinal    UPPER GASTROINTESTINAL ENDOSCOPY N/A 2021    EGD BIOPSY performed by Varsha Faustin MD at [address] [place] [address] History:    Social History     Tobacco Use    Smoking status: Former     Types: Cigarettes     Quit date: 1985     Years since quittin.7    Smokeless tobacco: Never   Substance Use Topics    Alcohol use:  No                                Counseling given: Not Answered      Vital Signs (Current):   Vitals:    22 1420   Weight: 175 lb (79.4 kg)   Height: 5' 6\" (1.676 m)                                              BP Readings from Last 3 Encounters:   22 134/70   22 110/72   22 117/76 NPO Status:                                                                                 BMI:   Wt Readings from Last 3 Encounters:   08/23/22 175 lb (79.4 kg)   08/23/22 181 lb (82.1 kg)   08/10/22 181 lb 10.5 oz (82.4 kg)     Body mass index is 28.25 kg/m². CBC:   Lab Results   Component Value Date/Time    WBC 5.8 08/11/2022 05:44 AM    RBC 4.31 08/11/2022 05:44 AM    HGB 14.0 08/11/2022 05:44 AM    HCT 41.9 08/11/2022 05:44 AM    MCV 97.1 08/11/2022 05:44 AM    RDW 13.0 08/11/2022 05:44 AM     08/11/2022 05:44 AM       CMP:   Lab Results   Component Value Date/Time     08/11/2022 05:43 AM    K 4.2 08/11/2022 05:43 AM     08/11/2022 05:43 AM    CO2 29 08/11/2022 05:43 AM    BUN 21 08/11/2022 05:43 AM    CREATININE 1.1 08/11/2022 05:43 AM    GFRAA >60 08/11/2022 05:43 AM    AGRATIO 1.5 08/10/2022 07:22 AM    LABGLOM >60 08/11/2022 05:43 AM    GLUCOSE 111 08/11/2022 05:43 AM    PROT 6.7 08/10/2022 07:22 AM    CALCIUM 9.2 08/11/2022 05:43 AM    BILITOT 0.5 08/10/2022 07:22 AM    ALKPHOS 83 08/10/2022 07:22 AM    AST 18 08/10/2022 07:22 AM    ALT 14 08/10/2022 07:22 AM       POC Tests: No results for input(s): POCGLU, POCNA, POCK, POCCL, POCBUN, POCHEMO, POCHCT in the last 72 hours.     Coags:   Lab Results   Component Value Date/Time    PROTIME 11.0 08/27/2012 01:45 PM    INR 0.96 08/27/2012 01:45 PM       HCG (If Applicable): No results found for: PREGTESTUR, PREGSERUM, HCG, HCGQUANT     ABGs: No results found for: PHART, PO2ART, SDS7YUH, CCD6XXB, BEART, G6BIKFAA     Type & Screen (If Applicable):  No results found for: LABABO, LABRH    Drug/Infectious Status (If Applicable):  No results found for: HIV, HEPCAB    COVID-19 Screening (If Applicable):   Lab Results   Component Value Date/Time    COVID19 DETECTED 08/10/2022 07:46 AM           Anesthesia Evaluation     Anesthesia Plan        DASIA Polanco MD   8/25/2022

## 2022-08-25 NOTE — ANESTHESIA PROCEDURE NOTES
Peripheral Block    Patient location during procedure: pre-op  Reason for block: at surgeon's request  Start time: 8/25/2022 8:30 AM  End time: 8/25/2022 8:40 AM  Staffing  Performed: anesthesiologist   Anesthesiologist: Paulo Lundborg, MD  Preanesthetic Checklist  Completed: patient identified, IV checked, site marked, risks and benefits discussed, surgical/procedural consents, equipment checked, pre-op evaluation, timeout performed, anesthesia consent given, oxygen available, monitors applied/VS acknowledged and fire risk safety assessment completed and verbalized  Peripheral Block   Patient position: left lateral decubitus  Prep: ChloraPrep  Provider prep: sterile gloves and mask  Patient monitoring: continuous pulse ox, oxygen and responsive to questions  Block type: Sciatic  Popliteal  Laterality: right  Injection technique: single-shot  Guidance: ultrasound guided  Local infiltration: ropivacaine  Infiltration strength: 0.5 %  Local infiltration: ropivacaine  Dose: 30 mL    Needle   Needle type: short-bevel   Needle gauge: 21 G  Needle localization: anatomical landmarks and ultrasound guidance  Needle insertion depth: 6 cm  Test dose: negative  Needle length: 8 cm  Assessment   Injection assessment: negative aspiration for heme, no paresthesia on injection, local visualized surrounding nerve on ultrasound and no intravascular symptoms  Paresthesia pain: none  Slow fractionated injection: yes  Hemodynamics: stable  Real-time US image taken/store: yes  Outcomes: uncomplicated    Medications Administered  ropivacaine (NAROPIN) injection 0.5% - Perineural, Leg Right   30 mL - 8/25/2022 8:30:00 AM

## 2022-08-25 NOTE — PROGRESS NOTES
Patient had crackers and [employer] - tolerated well. States his pain is tolerable now and he will wait until he gets home to take pain medication. Discharge instructions reviewed with patient/responsible adult and understanding verbalized. Discharge instructions signed and copies given. Patient discharged home with belongings.

## 2022-08-25 NOTE — PROGRESS NOTES
Dr Flakito Thomas here to see patient - patient states he feels sensation of lump in his throat - Dr Flakito Thomas states is probably from having the LMA in there during surgery.

## 2022-08-25 NOTE — ANESTHESIA PRE PROCEDURE
Department of Anesthesiology  Preprocedure Note       Name:  Tristan Brown   Age:  76 y.o.  :  1945                                          MRN:  [id number]         Date:  2022      Surgeon: Sohail Bernal):  Sukhi Llamas MD    Procedure: Procedure(s):  OPEN REDUCTION INTERNAL FIXATION RIGHT BIMALLEOLAR ANKLE FRACTURE -BLOCK    Medications prior to admission:   Prior to Admission medications    Medication Sig Start Date End Date Taking?  Authorizing Provider   aspirin 81 MG EC tablet Take 81 mg by mouth daily   Yes Historical Provider, MD   famotidine (PEPCID) 20 MG tablet Take 20 mg by mouth 2 times daily   Yes Historical Provider, MD   pravastatin (PRAVACHOL) 80 MG tablet Take 80 mg by mouth daily   Yes Historical Provider, MD   cetirizine (ZYRTEC) 10 MG tablet Take 20 mg by mouth in the morning and at bedtime    Historical Provider, MD   metoprolol succinate (TOPROL XL) 25 MG extended release tablet Take 1 tablet by mouth 2 times daily  Patient taking differently: Take 25 mg by mouth at bedtime 21   Prema Theoplis Spindle, APRN - CNP   doxepin (SINEQUAN) 100 MG capsule Take 100 mg by mouth nightly    Historical Provider, MD       Current medications:    Current Facility-Administered Medications   Medication Dose Route Frequency Provider Last Rate Last Admin    ceFAZolin (ANCEF) 2000 mg in dextrose 5 % 100 mL IVPB  2,000 mg IntraVENous On Call to  Loc Cheatham MD        lidocaine PF 1 % injection 1 mL  1 mL IntraDERmal Once PRN Saundra Tavares MD        lactated ringers infusion   IntraVENous Continuous Saundra Tavares  mL/hr at 22 0814 New Bag at 22 0814    sodium chloride flush 0.9 % injection 5-40 mL  5-40 mL IntraVENous 2 times per day Saundra Tavares MD        sodium chloride flush 0.9 % injection 5-40 mL  5-40 mL IntraVENous PRN Saundra Tavares MD        0.9 % sodium chloride infusion   IntraVENous PRN Saundra Tavares MD        sodium chloride flush 0.9 % injection 5-40 mL  5-40 mL IntraVENous 2 times per day Severiano Vásquez MD        sodium chloride flush 0.9 % injection 5-40 mL  5-40 mL IntraVENous PRN Severiano Vásquez MD        0.9 % sodium chloride infusion   IntraVENous PRN Severiano Vásquez MD        ceFAZolin (ANCEF) 2000 mg in dextrose 5 % 100 mL IVPB  2,000 mg IntraVENous On Call to 64 Loc Cheatham MD           Allergies: Allergies   Allergen Reactions    Losartan Anaphylaxis    Atorvastatin      nausea    Lisinopril Other (See Comments)     cough    Prednisone Other (See Comments)     Insomnia, hyper, \"skin crawling\"       Problem List:    Patient Active Problem List   Diagnosis Code    Chest pain R07.9    CAD (coronary artery disease) I25.10    Abnormal EKG R94.31    Hypertension I10    Syncope and collapse R55       Past Medical History:        Diagnosis Date    Arthritis     joints    CAD (coronary artery disease) 2008    stents x2    Diabetes mellitus ([address]     diet controlled    Hyperlipidemia     Hypertension     Panic anxiety syndrome     Sleep apnea     never returned for appliance       Past Surgical History:        Procedure Laterality Date    APPENDECTOMY      CARDIAC SURGERY      2 stents ~     CHOLECYSTECTOMY      COLONOSCOPY  2010    COLONOSCOPY  2015    Poylps    HERNIA REPAIR      bilateral inguinal    UPPER GASTROINTESTINAL ENDOSCOPY N/A 2021    EGD BIOPSY performed by John Corona MD at [address] [facility] History:    Social History     Tobacco Use    Smoking status: Former     Types: Cigarettes     Quit date: 1985     Years since quittin.7    Smokeless tobacco: Never   Substance Use Topics    Alcohol use:  No                                Counseling given: Not Answered      Vital Signs (Current):   Vitals:    22 1420 22 0812   BP:  123/69   Pulse:  73   Resp:  16   Temp:  97.2 °F (36.2 °C)   SpO2:  95%   Weight: 175 lb (79.4 kg)    Height: 5' 6\" (1.676 m) BP Readings from Last 3 Encounters:   08/25/22 123/69   08/11/22 134/70   07/25/22 110/72       NPO Status: Time of last liquid consumption: 1900                        Time of last solid consumption: 1900                        Date of last liquid consumption: 08/24/22                        Date of last solid food consumption: 08/24/22    BMI:   Wt Readings from Last 3 Encounters:   08/23/22 175 lb (79.4 kg)   08/23/22 181 lb (82.1 kg)   08/10/22 181 lb 10.5 oz (82.4 kg)     Body mass index is 28.25 kg/m².     CBC:   Lab Results   Component Value Date/Time    WBC 5.8 08/11/2022 05:44 AM    RBC 4.31 08/11/2022 05:44 AM    HGB 14.0 08/11/2022 05:44 AM    HCT 41.9 08/11/2022 05:44 AM    MCV 97.1 08/11/2022 05:44 AM    RDW 13.0 08/11/2022 05:44 AM     08/11/2022 05:44 AM       CMP:   Lab Results   Component Value Date/Time     08/11/2022 05:43 AM    K 4.2 08/11/2022 05:43 AM     08/11/2022 05:43 AM    CO2 29 08/11/2022 05:43 AM    BUN 21 08/11/2022 05:43 AM    CREATININE 1.1 08/11/2022 05:43 AM    GFRAA >60 08/11/2022 05:43 AM    AGRATIO 1.5 08/10/2022 07:22 AM    LABGLOM >60 08/11/2022 05:43 AM    GLUCOSE 111 08/11/2022 05:43 AM    PROT 6.7 08/10/2022 07:22 AM    CALCIUM 9.2 08/11/2022 05:43 AM    BILITOT 0.5 08/10/2022 07:22 AM    ALKPHOS 83 08/10/2022 07:22 AM    AST 18 08/10/2022 07:22 AM    ALT 14 08/10/2022 07:22 AM       POC Tests:   Recent Labs     08/25/22  0810   POCGLU 94       Coags:   Lab Results   Component Value Date/Time    PROTIME 11.0 08/27/2012 01:45 PM    INR 0.96 08/27/2012 01:45 PM       HCG (If Applicable): No results found for: PREGTESTUR, PREGSERUM, HCG, HCGQUANT     ABGs: No results found for: PHART, PO2ART, NYF1POA, TAV7MCG, BEART, T6WYSVTO     Type & Screen (If Applicable):  No results found for: LABABO, LABRH    Drug/Infectious Status (If Applicable):  No results found for: HIV, HEPCAB    COVID-19 Screening (If Applicable):   Lab Results   Component Value Date/Time    COVID19 DETECTED 08/10/2022 07:46 AM           Anesthesia Evaluation  Patient summary reviewed and Nursing notes reviewed no history of anesthetic complications:   Airway: Mallampati: II  TM distance: >3 FB   Neck ROM: full  Mouth opening: > = 3 FB   Dental: normal exam         Pulmonary:normal exam    (+) sleep apnea:                             Cardiovascular:    (+) hypertension:, CAD:, CABG/stent: no interval change,                   Neuro/Psych:   (+) psychiatric history:            GI/Hepatic/Renal: Neg GI/Hepatic/Renal ROS            Endo/Other:    (+) Diabetes, . Abdominal:             Vascular: negative vascular ROS. Other Findings:           Anesthesia Plan      general     ASA 3       Induction: intravenous. MIPS: Postoperative opioids intended. Anesthetic plan and risks discussed with patient and spouse. Plan discussed with attending.     Attending anesthesiologist reviewed and agrees with Preprocedure content      Post-op pain plan if not by surgeon: single peripheral nerve block            DASIA Gustafson MD   8/25/2022

## 2022-08-25 NOTE — INTERVAL H&P NOTE
Update History & Physical    The patient's History and Physical of August 23, 2022 was reviewed with the patient and I examined the patient. There was no change. The surgical site was confirmed by the patient and me. Plan: The risks, benefits, expected outcome, and alternative to the recommended procedure have been discussed with the patient. Patient understands and wants to proceed with the procedure.      Electronically signed by Giuliana Mantilla MD on 8/25/2022 at 9:53 AM

## 2022-08-26 NOTE — OP NOTE
operating table and laid supine with all bony prominences well-padded. A bump was placed under the operative leg to internally rotate the limb. A nonsterile tourniquet was placed high in the left thigh. General anesthesia was induced. Next, the right lower extremity was prepped and draped in standard sterile fashion. A timeout was held to verify the correct patient, operative site, laterality, and procedure. Everyone in the room was in agreement. At this point, a standard lateral approach to the distal fibula was utilized. An incision centered along the shaft of the fibula was made. Dissection was carried sharply through the skin and subcutaneous tissues. Blunt dissection was carried out using tenotomy scissors down to the bone. The superficial peroneal nerve was identified in the proximal extent of the incision and protected throughout the case. Next, there was noted to be dense callus and periosteal tissue about the fracture site. This was peeled off in order to visualize the fracture site. There is more comminution than expected. The fracture site was for the most part oblique. An attempt was made to gain the appropriate fibular length. Due to early healing, this was difficult. Next, a key elevator was used to free up the distal fibula fragment in order to allow for mobilization to obtain appropriate fibular length. An attempt was made to clamp this in an anatomic position. There was displacement of some of the comminution with this attempt. Next, decision was made to pull the fibula out to length and pin it in an anatomic position at the syndesmosis. Next, a distal fibular locking plate was selected from the set. This was fine-tuned into appropriate position. It was provisionally fixated distally with a locking screw and proximally with a bicortical screw. C-arm fluoroscopy was used to position the plate appropriately on AP, mortise, and lateral projections.   With reduction of the distal fibula fracture, there was resolution of medial clear space widening as appropriate. Next, being satisfied with fracture reduction, fibular length, syndesmosis alignment, fixation was completed with additional bicortical screws proximally and locking screws distally. Next, the ankle was stressed using a cotton test with a lobster claw clamp on the fibula. There was widening of the syndesmosis. Decision was made to place a tight rope fixation. At this point, 2 tight ropes were deployed across the syndesmosis. Repeat C-arm fluoroscopy images in AP, mortise, and lateral projections demonstrate appropriate alignment and hardware placement. Final images were saved. The wound was then copiously irrigated with sterile saline solution. Tissue was closed over the plate using 0 Vicryl in an interrupted fashion. The wound was then closed in layered fashion using 2-0 Vicryl and a interrupted 3-0 nylon suture. The wound was anesthetized with Marcaine half percent plain. A sterile dressing of Xeroform, gauze, ABD, and Ace wrap was applied. He was returned to a cam boot. He was then awakened from general anesthesia and transitioned back to the hospital bed. He was taken to postoperative recovery area in apparent stable condition. There were no immediate complications. All sponge and needle counts were correct.     Electronically signed by Darrin Collins MD on 8/25/2022 at 10:00 PM

## 2022-08-31 ENCOUNTER — OFFICE VISIT (OUTPATIENT)
Dept: ORTHOPEDIC SURGERY | Age: 77
End: 2022-08-31

## 2022-08-31 VITALS — HEIGHT: 66 IN | WEIGHT: 175 LBS | BODY MASS INDEX: 28.12 KG/M2

## 2022-08-31 DIAGNOSIS — Z98.890 STATUS POST ORIF OF FRACTURE OF ANKLE: Primary | ICD-10-CM

## 2022-08-31 DIAGNOSIS — M25.571 RIGHT ANKLE PAIN, UNSPECIFIED CHRONICITY: ICD-10-CM

## 2022-08-31 DIAGNOSIS — Z87.81 STATUS POST ORIF OF FRACTURE OF ANKLE: Primary | ICD-10-CM

## 2022-08-31 PROCEDURE — 99024 POSTOP FOLLOW-UP VISIT: CPT | Performed by: ORTHOPAEDIC SURGERY

## 2022-08-31 NOTE — PROGRESS NOTES
ORTHOPAEDIC SURGERY FOLLOWUP VISIT    CHIEF COMPLAINT:  Right ankle    DATE OF INJURY: 8/10/2022  DIAGNOSIS: Right bimalleolar equivalent ankle fracture  DATE OF SURGERY: 8/25/2022, ORIF right ankle    HISTORY OF PRESENT ILLNESS:  64-year-old male presents POD #6 status post ORIF right ankle injury. Overall, he is doing very well. He notes soreness over the lateral ankle as well as some minor discomfort medially. He has not had any fevers, chills, or night sweats. He has been compliant with nonweightbearing restrictions. He has been taking aspirin 81 twice daily for DVT prevention. He has been performing local wound care to his ankle as instructed. He denies significant drainage. He denies numbness or tingling in his leg. He has noticed some minor cramping in his calf area. He has only taken a few narcotic pain medication since his surgery. PHYSICAL EXAM:  General: Well-appearing male of stated age. No acute distress. Right lower extremity: Presents in cam boot. This was removed. Dressing is clean, dry, and intact. This was removed to evaluate the incision. Lateral ankle incision is well approximated with nylon sutures. There are no signs of dehiscence. No open areas. No surrounding erythema. No active drainage. There is bruising/ecchymosis diffusely about the lateral ankle. No blistering. No medial ankle wounds. No medial tenderness to palpation. Overall there is no deformity. Ankle range of motion is 5 degrees dorsiflexion to 40 degrees plantarflexion. There is no difficulty with ankle range of motion. He can wiggle his toes without difficulty. Sensation is intact to light touch in deep peroneal, superficial peroneal, tibial, sural, and saphenous nerve distributions. Motor function is intact to EHL, FHL, tibialis anterior, and gastroc. There is brisk capillary refill to the toes and a strong palpable dorsalis pedis pulse. Compartments are soft and compressible.   There is no calf tenderness and a negative Homans' sign. RADIOGRAPHIC EXAM:  3 views of the right ankle including AP, mortise, and lateral projections demonstrate near-anatomic alignment of bimalleolar equivalent ankle fracture. There is been no interval loss of fixation. Fracture line appears radiographically occult on AP image. There is appropriate positioning of the talus within the ankle mortise. No loosening or lucency of orthopedic hardware. Fibular length is appropriate. No periosteal callus. ASSESSMENT AND PLAN:  POD #6 status post right ankle ORIF    Healing uneventfully. Continue with nonweightbearing restrictions. Local wound care instructions were provided. May shower but not soak or submerge incision sites. He was counseled to allow the water to rinse over the incision and then pat dry and apply new dressing. He will keep it covered with a soft dressing. He was advised to wear the boot when up and about as well as during sleep, but he can remove the boot for hygiene purposes, ankle range of motion exercises, and skin rest.  Nonweightbearing restrictions will continue 6 weeks and then he will be allowed full weightbearing in the boot based on radiographic signs of healing. I would see him back in 1 week for repeat wound check and suture removal if amenable.     Giuliana Mantilla MD

## 2022-09-07 ENCOUNTER — OFFICE VISIT (OUTPATIENT)
Dept: ORTHOPEDIC SURGERY | Age: 77
End: 2022-09-07

## 2022-09-07 VITALS — HEIGHT: 66 IN | BODY MASS INDEX: 28.12 KG/M2 | WEIGHT: 175 LBS

## 2022-09-07 DIAGNOSIS — Z98.890 STATUS POST ORIF OF FRACTURE OF ANKLE: Primary | ICD-10-CM

## 2022-09-07 DIAGNOSIS — Z87.81 STATUS POST ORIF OF FRACTURE OF ANKLE: Primary | ICD-10-CM

## 2022-09-07 PROCEDURE — 99024 POSTOP FOLLOW-UP VISIT: CPT | Performed by: ORTHOPAEDIC SURGERY

## 2022-09-07 NOTE — PROGRESS NOTES
dorsalis pedis pulse. Compartments are soft and compressible. There is no calf tenderness and a negative Homans' sign. ASSESSMENT AND PLAN:  POD #13 status post right ankle ORIF     Healing uneventfully. Continue with nonweightbearing restrictions. Sutures removed and steristrips applied. Local wound care instructions were provided. May shower but not soak or submerge incision sites. He was counseled to allow the water to rinse over the incision and then pat dry and apply new dressing. He will keep it covered with a soft dressing. He was advised to wear the boot when up but can prop on a pillow during sleep. He can remove the boot for hygiene purposes, ankle range of motion exercises, and skin rest.  Nonweightbearing restrictions will continue 6 weeks . I would see him back in 4 weeks for repeat xrays and likely allow WBAT at that time.      Tyesha Guevara MD

## 2022-09-16 ENCOUNTER — TELEPHONE (OUTPATIENT)
Dept: ORTHOPEDIC SURGERY | Age: 77
End: 2022-09-16

## 2022-09-16 NOTE — TELEPHONE ENCOUNTER
General Question     Subject: PATIENT HAS QUESTIONS/CONCERNS REGARDING PREVIOUS SX.  PLEASE ADVISE   Patient: Magdalena Jame Number: [phone]

## 2022-09-19 NOTE — TELEPHONE ENCOUNTER
Patient is following up on phone call regarding incision area rt ankle. Does have some redness and would like to speak to someone this morning.   [phone]    Please Advise

## 2022-09-19 NOTE — TELEPHONE ENCOUNTER
Spoke to Jah Max. He indicates there is an area in the central portion of the wound that has dried blood and a small surrounding rim of redness. He also notes some minor dependent redness. Swelling has resolved and there is very little pain. He denies fevers/chills. I offered for him to send a photo electronically and also offered an appointment for tomorrow. He will come for wound check at [address]

## 2022-09-20 ENCOUNTER — OFFICE VISIT (OUTPATIENT)
Dept: ORTHOPEDIC SURGERY | Age: 77
End: 2022-09-20

## 2022-09-20 VITALS — WEIGHT: 175 LBS | HEIGHT: 66 IN | BODY MASS INDEX: 28.12 KG/M2

## 2022-09-20 DIAGNOSIS — Z98.890 STATUS POST ORIF OF FRACTURE OF ANKLE: Primary | ICD-10-CM

## 2022-09-20 DIAGNOSIS — Z87.81 STATUS POST ORIF OF FRACTURE OF ANKLE: Primary | ICD-10-CM

## 2022-09-20 PROCEDURE — 99024 POSTOP FOLLOW-UP VISIT: CPT | Performed by: ORTHOPAEDIC SURGERY

## 2022-09-20 NOTE — PROGRESS NOTES
no calf tenderness and a negative Homans' sign. Radiographic examination: 3 views of the right ankle including AP, mortise, and lateral projections demonstrate appropriate alignment of the ankle status post ORIF. There is no evidence of hardware loosening or lucency. There is progressive callus about the medial and posterior aspect of the distal fibula consistent with interval healing. The mortise is anatomically aligned. No loss of fixation. ASSESSMENT AND PLAN:  4 weeks status post right ankle ORIF     Healing uneventfully. Continue with nonweightbearing restrictions for an additional 2 weeks. May advance to weightbearing as tolerated in a cam boot at that time. May shower but not soak or submerge incision sites. He was advised to wear the boot when up but can prop on a pillow during sleep. He can remove the boot for hygiene purposes, ankle range of motion exercises, and skin rest.  I would see him back in 3 weeks for repeat xrays, sooner for problems.      Aleena Palmer MD

## 2022-10-05 ENCOUNTER — OFFICE VISIT (OUTPATIENT)
Dept: ORTHOPEDIC SURGERY | Age: 77
End: 2022-10-05

## 2022-10-05 VITALS — BODY MASS INDEX: 28.28 KG/M2 | HEIGHT: 66 IN | WEIGHT: 176 LBS

## 2022-10-05 DIAGNOSIS — Z98.890 STATUS POST ORIF OF FRACTURE OF ANKLE: Primary | ICD-10-CM

## 2022-10-05 DIAGNOSIS — Z87.81 STATUS POST ORIF OF FRACTURE OF ANKLE: Primary | ICD-10-CM

## 2022-10-05 DIAGNOSIS — M25.571 RIGHT ANKLE PAIN, UNSPECIFIED CHRONICITY: ICD-10-CM

## 2022-10-05 PROCEDURE — 99024 POSTOP FOLLOW-UP VISIT: CPT | Performed by: ORTHOPAEDIC SURGERY

## 2022-10-05 NOTE — PROGRESS NOTES
ORTHOPAEDIC SURGERY FOLLOWUP VISIT     CHIEF COMPLAINT:  Right ankle     DATE OF INJURY: 8/10/2022  DIAGNOSIS: Right bimalleolar equivalent ankle fracture  DATE OF SURGERY: 8/25/2022, ORIF right ankle     HISTORY OF PRESENT ILLNESS:  75-year-old male presents 6 weeks status post ORIF right ankle injury. Overall, he is doing very well. He notes soreness over the lateral ankle as well as some minor discomfort medially. He has not had any fevers, chills, or night sweats. He has been compliant with nonweightbearing restrictions. He has been taking aspirin 81 twice daily for DVT prevention. He has been performing local wound care to his ankle as instructed. He denies significant drainage. He denies numbness or tingling in his leg. He presents for evaluation of some redness around his wound and an area of scabbing. He indicates that his boot is rubbing this site. He rates his pain 2 out of 10 in severity. He has been weightbearing without a significant increase in pain. PHYSICAL EXAM:  General: Well-appearing male of stated age. No acute distress. Right lower extremity: Presents without immobilization. Lateral ankle incision is maturely healed with exception of some scabbing/ulceration in the central portion of the wound. There is minor seepage of serous fluid. There is surrounding hyperemia. No erythema. There are no signs of dehiscence. No open areas. No active drainage. Bruising/ecchymosis resolving. Ankle swelling much improved from prior evaluation. No blistering. No medial ankle wounds. Hair pattern indicates peripheral vascular disease. The purplish hue to the foot confirms peripheral vascular disease. No medial tenderness to palpation. Overall there is no deformity. Ankle range of motion is 5 degrees dorsiflexion to 40 degrees plantarflexion. There is no difficulty with ankle range of motion. He can wiggle his toes without difficulty.  Sensation is intact to light touch in deep peroneal, superficial peroneal, tibial, sural, and saphenous nerve distributions. Motor function is intact to EHL, FHL, tibialis anterior, and gastroc. There is brisk capillary refill to the toes and a strong palpable dorsalis pedis pulse. Compartments are soft and compressible. There is no calf tenderness and a negative Homans' sign. Radiographic examination: 3 views of the right ankle including AP, mortise, and lateral projections demonstrate appropriate alignment of the ankle status post ORIF. There is no evidence of hardware loosening or lucency. There is progressive callus about the medial and posterior aspect of the distal fibula consistent with interval healing. There appears to be developing synostosis within the syndesmosis. The mortise is anatomically aligned. No loss of fixation. ASSESSMENT AND PLAN:  6 weeks status post right ankle ORIF     Healing uneventfully. Advance to full weightbearing as tolerated. I advised him that it would be okay to discontinue the cam walking boot at this time in preference for appropriate wound healing and avoidance from worsening skin breakdown. May shower but not soak or submerge incision sites. Does not need to wear the boot while sleeping. He will transition gradually over to regular footwear. I would allow him to drive from a motor vehicle at this time. I would see him back in 4 weeks for repeat xrays and a repeat wound check to ensure that his wound heals in appropriately without further intervention, sooner for problems.      Suzanne Kenyon MD

## 2022-11-02 ENCOUNTER — OFFICE VISIT (OUTPATIENT)
Dept: ORTHOPEDIC SURGERY | Age: 77
End: 2022-11-02

## 2022-11-02 VITALS — BODY MASS INDEX: 28.28 KG/M2 | WEIGHT: 176 LBS | HEIGHT: 66 IN

## 2022-11-02 DIAGNOSIS — Z98.890 STATUS POST ORIF OF FRACTURE OF ANKLE: Primary | ICD-10-CM

## 2022-11-02 DIAGNOSIS — Z87.81 STATUS POST ORIF OF FRACTURE OF ANKLE: Primary | ICD-10-CM

## 2022-11-02 PROCEDURE — 99024 POSTOP FOLLOW-UP VISIT: CPT | Performed by: ORTHOPAEDIC SURGERY

## 2022-11-02 RX ORDER — DOXYCYCLINE HYCLATE 100 MG
100 TABLET ORAL 2 TIMES DAILY
Qty: 20 TABLET | Refills: 0 | Status: SHIPPED | OUTPATIENT
Start: 2022-11-02 | End: 2022-11-12

## 2022-11-03 NOTE — PROGRESS NOTES
ORTHOPAEDIC SURGERY FOLLOWUP VISIT     CHIEF COMPLAINT:  Right ankle     DATE OF INJURY: 8/10/2022  DIAGNOSIS: Right bimalleolar equivalent ankle fracture  DATE OF SURGERY: 8/25/2022, ORIF right ankle     HISTORY OF PRESENT ILLNESS:  49-year-old male presents 10 weeks status post ORIF right ankle injury. Overall, he is doing very well. He notes soreness over the lateral ankle as well as some minor discomfort medially. He has not had any fevers, chills, or night sweats. He has been taking aspirin 81 twice daily for DVT prevention. He has been performing local wound care to his ankle as instructed. He denies significant drainage. He denies numbness or tingling in his leg. He presents for repeat evaluation of some redness around his wound and an area of scabbing. He indicates that his boot is rubbing this site. He rates his pain 0-1 out of 10 in severity. He has been weightbearing without a significant increase in pain. PHYSICAL EXAM:  General: Well-appearing male of stated age. No acute distress. Right lower extremity: Presents without immobilization. Lateral ankle incision is maturely healed with exception of some scabbing/ulceration in the central portion of the wound. There is no seepage of fluid. There is surrounding minimal surrounding hyperemia. No erythema. There are no signs of dehiscence. No open areas. No active drainage. Bruising/ecchymosis resolved. Ankle swelling much improved from prior evaluation. No blistering. No medial ankle wounds. Hair pattern indicates peripheral vascular disease. The purplish hue to the foot confirms peripheral vascular disease. No medial tenderness to palpation. Overall there is no deformity. Ankle range of motion is 5 degrees dorsiflexion to 40 degrees plantarflexion. There is no difficulty with ankle range of motion. He can wiggle his toes without difficulty.  Sensation is intact to light touch in deep peroneal, superficial peroneal, tibial, sural, and saphenous nerve distributions. Motor function is intact to EHL, FHL, tibialis anterior, and gastroc. There is brisk capillary refill to the toes and a strong palpable dorsalis pedis pulse. Compartments are soft and compressible. There is no calf tenderness and a negative Homans' sign. Radiographic examination: 3 views of the right ankle including AP, mortise, and lateral projections demonstrate appropriate alignment of the ankle status post ORIF. There is no evidence of hardware loosening or lucency. There is progressive callus about the medial and posterior aspect of the distal fibula consistent with interval healing. There appears to be developing synostosis within the syndesmosis, maturing from prior eval.  The mortise is anatomically aligned. No loss of fixation. ASSESSMENT AND PLAN:  10 weeks status post right ankle ORIF     Healing uneventfully. Advance to full weightbearing as tolerated. I advised him that it would be okay to discontinue the cam walking boot at this time in preference for appropriate wound healing and avoidance from worsening skin breakdown. May shower but not soak or submerge incision sites  I would allow him to drive from a motor vehicle at this time. I did remove the scab and perform some local debridement. He we will proceed with daily gauze dressing changes. He can perform this as a wet-to-dry dressing. I advised him that peeling of the superficial layer from healthy tissue would be good to promote wound healing. This should heal from deep to superficial.  I advised him to contact the office if he develops signs or symptoms of infection. I did prescribe him and oral antibiotic in the form of doxycycline as a prophylactic measure while his wound is granulating in. Return to clinic in 2 weeks for repeat wound check. No x-rays needed at that time unless adverse changes.      Luanne Mar MD

## 2022-11-16 ENCOUNTER — OFFICE VISIT (OUTPATIENT)
Dept: ORTHOPEDIC SURGERY | Age: 77
End: 2022-11-16

## 2022-11-16 VITALS — BODY MASS INDEX: 28.28 KG/M2 | WEIGHT: 176 LBS | HEIGHT: 66 IN

## 2022-11-16 DIAGNOSIS — Z87.81 STATUS POST ORIF OF FRACTURE OF ANKLE: Primary | ICD-10-CM

## 2022-11-16 DIAGNOSIS — Z98.890 STATUS POST ORIF OF FRACTURE OF ANKLE: Primary | ICD-10-CM

## 2022-11-16 PROCEDURE — 99024 POSTOP FOLLOW-UP VISIT: CPT | Performed by: ORTHOPAEDIC SURGERY

## 2022-11-16 NOTE — PROGRESS NOTES
ORTHOPAEDIC SURGERY FOLLOWUP VISIT     CHIEF COMPLAINT:  Right ankle     DATE OF INJURY: 8/10/2022  DIAGNOSIS: Right bimalleolar equivalent ankle fracture  DATE OF SURGERY: 8/25/2022, ORIF right ankle     HISTORY OF PRESENT ILLNESS:  66-year-old male presents 12 weeks status post ORIF right ankle injury. Overall, he is doing very well. He notes soreness over the lateral ankle as well as some minor discomfort medially. He has not had any fevers, chills, or night sweats. He has been taking aspirin 81 twice daily for DVT prevention. He has been performing local wound care to his ankle as instructed. At last visit, he had some debridement of unhealthy appearing tissue within the base of his wound. He denies significant drainage. He denies numbness or tingling in his leg. The redness around his wound has resolved. He rates his pain 0-1 out of 10 in severity. He has been weightbearing without a significant increase in pain. PHYSICAL EXAM:  General: Well-appearing male of stated age. No acute distress. Right lower extremity: Presents without immobilization. Lateral ankle incision is maturely healed with exception of some scabbing/ulceration in the central portion of the wound with the exception of a scab at the central portion of the wound. This is much improved from previous evaluation. There is no surrounding hyperemia or erythema. There are no signs of dehiscence. No open areas. No active drainage. Bruising/ecchymosis resolved. Ankle swelling minimal.  No blistering. No medial ankle wounds. Hair pattern indicates peripheral vascular disease. The purplish hue to the foot confirms peripheral vascular disease. No medial tenderness to palpation. Overall there is no deformity. Ankle range of motion is 5 degrees dorsiflexion to 40 degrees plantarflexion. There is no difficulty with ankle range of motion. He can wiggle his toes without difficulty.  Sensation is intact to light touch in deep peroneal, superficial peroneal, tibial, sural, and saphenous nerve distributions. Motor function is intact to EHL, FHL, tibialis anterior, and gastroc. There is brisk capillary refill to the toes and a strong palpable dorsalis pedis pulse. Compartments are soft and compressible. There is no calf tenderness and a negative Homans' sign. Radiographic examination:  No new x-rays obtained today. ASSESSMENT AND PLAN:  12 weeks status post right ankle ORIF     The wound appears much improved today. There has been resolution of cellulitis/hyperemia/redness. There is mature healing/scabbing. At this time, I reviewed with him that I did not have any concerns about his wound healing. He will continue with local wound care as discussed. He will continue with full weightbearing as tolerated, activities as tolerated. Follow-up as needed in the future.      Kvng Jean MD

## 2023-07-13 ENCOUNTER — HOSPITAL ENCOUNTER (OUTPATIENT)
Age: 78
Discharge: HOME | End: 2023-07-13
Payer: COMMERCIAL

## 2023-07-13 ENCOUNTER — HOSPITAL ENCOUNTER (OUTPATIENT)
Dept: GENERAL RADIOLOGY | Age: 78
Discharge: HOME | End: 2023-07-13
Payer: COMMERCIAL

## 2023-07-13 DIAGNOSIS — Z00.00 ROUTINE GENERAL MEDICAL EXAMINATION AT A HEALTH CARE FACILITY: ICD-10-CM

## 2023-07-13 PROCEDURE — 71046 X-RAY EXAM CHEST 2 VIEWS: CPT

## 2023-10-24 NOTE — CARE COORDINATION
Chart reviewed and case discussed with primary team for possible needs at DC. Pt placed in OBS 4/6/21 for chest pain being followed by IM and Cardiology. Pt had stress test today; results pending. Per chart, pt from home and IPTA with no indicatio for PT/OT eval. Primary RN states that pt is very independent at baseline as well. He has insurance listed, but no PCP on file. CM will take PCP list to pt room for review. No further needs identified at this time. Please contact CM should needs arise.      Benita Woodruff RN Adequate: hears normal conversation without difficulty

## 2024-02-28 NOTE — H&P
Hospital Medicine History & Physical      PCP: Martin Varghese MD    Date of Admission: 8/10/2022    Date of Service: Pt seen/examined on 08/10/22 and Admitted to Inpatient with expected LOS greater than two midnights due to medical therapy. Chief Complaint   Patient presents with    Loss of Consciousness     EMS states patient was in the restroom washing his hair in the sink and had syncopal episode. Unsure if he hit his head. Patient does not remember falling to the ground, but remembers his wife waking him up. History Of Present Illness:    76 y.o. male with PMHc CAD s/p stentd in 2008 , HLD , HTN who presented to [facility] ED for evaluation of Syncope and Fall this AM . Patient reports that she woke up at around 630 as usual and went to restroom to wash his hair and passed out . Unsure if he hit is head , doesn't remember falling to the ground , the next thing he remembers is his wife was trying to waking him up on bathroom floor . He denies any recent fever, chills , N/V/D . He denies any diuretic use . Denies smoking , drinking , using illicit drugs . Reports he hasn't been drinking much water . Denies any chest pain, SOB , palpitations , seizures . ED course : Patient had Orthostatic vitals in ED , which were negative . EKG didn't show any ischemic/Interval changes . He tested positive for COVID on rapid testing (Recalls that he was at restaurant and probably one of his party members was found to have covid . He had Xray of right foot which showed Right fibula and lateral malleolar fracture of Ankle . He had a splint placed in ED and ortho consulted and admitted to the hospital for further E/M     The patient was informed of the results of tests, a time was given to answer questions, a plan was proposed and he agreed with plan. Medical reconciliation performed.      Past Medical History:      Diagnosis Date    Arthritis     joints    CAD (coronary artery disease) 2008    stents x2 Hyperlipidemia     Hypertension     Panic anxiety syndrome        Past Surgical History:        Procedure Laterality Date    APPENDECTOMY      CHOLECYSTECTOMY      COLONOSCOPY  12/16/10    COLONOSCOPY  2/24/2015    Poylps    HERNIA REPAIR      bilateral inguinal    UPPER GASTROINTESTINAL ENDOSCOPY N/A 4/8/2021    EGD BIOPSY performed by Carolyn Monreal MD at [address] [place] [address]       Medications Prior to Admission:    Prior to Admission medications    Medication Sig Start Date End Date Taking? Authorizing Provider   cetirizine (ZYRTEC) 10 MG tablet Take 20 mg by mouth in the morning and at bedtime   Yes Historical Provider, MD   famotidine (PEPCID) 20 MG tablet Take 1 tablet by mouth 2 times daily for 5 days 6/13/22 6/18/22  Sarah Salamanca MD   metoprolol succinate (TOPROL XL) 25 MG extended release tablet Take 1 tablet by mouth 2 times daily  Patient taking differently: Take 25 mg by mouth at bedtime 4/8/21   NATE Cartagena CNP   pantoprazole (PROTONIX) 40 MG tablet Take 1 tablet by mouth every morning (before breakfast) 4/9/21   NATE Cartagena CNP   rosuvastatin (CRESTOR) 10 MG tablet Take 20 mg by mouth daily. Historical Provider, MD   aspirin 325 MG tablet Take 325 mg by mouth daily. Historical Provider, MD   doxepin (SINEQUAN) 100 MG capsule Take 150 mg by mouth nightly    Historical Provider, MD   fluticasone (FLONASE) 50 MCG/ACT nasal spray 2 sprays by Nasal route daily. Historical Provider, MD       Allergies:  Losartan, Atorvastatin, Lisinopril, and Prednisone    Social History:    The patient currently lives at home and is independent of IADLs     TOBACCO:   reports that he quit smoking about 36 years ago. He has never used smokeless tobacco.  ETOH:   reports no history of alcohol use. Family History:     Reviewed in detail and negative for DM, CAD, Cancer, CVA.  Positive as follows:        Problem Relation Age of Onset    Heart Disease Father        REVIEW OF SYSTEMS:   Pertinent positives as noted in the HPI. All other systems reviewed and negative. PHYSICAL EXAM PERFORMED:  BP (!) 114/52   Pulse 71   Temp 98.1 °F (36.7 °C) (Oral)   Resp 17   Ht 5' 6\" (1.676 m)   Wt 181 lb 10.5 oz (82.4 kg)   SpO2 95%   BMI 29.32 kg/m²     General appearance:  No apparent distress, appears stated age and cooperative. HEENT:  Normal cephalic, atraumatic without obvious deformity. Pupils equal, round, and reactive to light. Extra ocular muscles intact. Conjunctivae/corneas clear. Neck: Supple, with full range of motion. No jugular venous distention. Trachea midline. Respiratory:  Normal respiratory effort. Clear to auscultation, bilaterally without Rales/Wheezes/Rhonchi. Cardiovascular:  Regular rate and rhythm with normal S1/S2 without murmurs, rubs or gallops. Abdomen: Soft, non-tender, non-distended with normal bowel sounds. Musculoskeletal:  No clubbing, cyanosis or edema bilaterally. Right ankle in splint   Skin: Skin color, texture, turgor normal.  No rashes or lesions. Neurologic:  Neurovascularly intact without any focal sensory/motor deficits. Cranial nerves: II-XII intact, grossly non-focal.  Psychiatric:  Alert and oriented, thought content appropriate, normal insight  Capillary Refill: Brisk,< 3 seconds   Peripheral Pulses: +2 palpable, equal bilaterally       Labs:   Recent Labs     08/10/22  0722   WBC 7.2   HGB 13.2*   HCT 39.9*        Recent Labs     08/10/22  0722   *   K 4.0      CO2 23   BUN 18   CREATININE 1.1   CALCIUM 8.3     Recent Labs     08/10/22  0722   AST 18   ALT 14   BILITOT 0.5   ALKPHOS 83     No results for input(s): INR in the last 72 hours.   Recent Labs     08/10/22  0722   TROPONINI <0.01       EKG:  I have reviewed the EKG with the following interpretation: NSR , Normal axis, Normal intervals , no significant ST/T changes     Radiology:    I have reviewed the Imaging  with the following interpretation:   XR ANKLE RIGHT (MIN 3 VIEWS)   Final Result   Minimally displaced spiral fracture of the distal fibula-lateral malleolus      Mild widening of the medial clear space is seen, suggesting ligamentous   injury medially         CT HEAD WO CONTRAST   Final Result   No acute intracranial abnormality. XR CHEST (2 VW)   Final Result   No radiographic evidence of acute pulmonary disease. XR ANKLE RIGHT (2 VIEWS)    (Results Pending)         Active Hospital Problems    Diagnosis Date Noted    Syncope and collapse [R55] 08/10/2022     Priority: Medium         Consults:  IP CONSULT TO ORTHOPEDIC SURGERY    ASSESSMENT/PLAN:  Syncope and collapse - Unclear etiology .   - Orthostatic vitals borderline dropped . Received IV fluids in ED . Encouraged PO fluids   - EKG w/o ischemic changes and w/o heart blocks . Continue tele monitor   - Check Echo to r/o structural heart problems     2. CAD and Hx stents x 2 (2008) - chest pain free - Continue home aspirin, statin, BB     3. Right Ankle fracture (Closed Fibula and lateral Malleolar Fracture ) - from fall at home . Splint placed in ED . Ortho consulted to assist with Mx . Will follow. Tramadol for Pain control PRN   - Defere PT/OT to ortho     4. HTN - optimal control on home regimen . Continue with parameters     5. HLD - on statin ; continue     6. Asymptomatic COVID - tetsted positive on admission 8/9 . Patient  up-to date with COVID vaccination   - continue droplet plus isolation while In patient . Monitor and supportive care       DVT Prophylaxis: Lovenox   Diet: ADULT DIET; Regular  Code Status: Full Code    PT/OT Eval Status: Will need     Dispo - Likely 1-2 days pending w/up and clinical improvement     Lizzie Vega MD    The note was completed using Dragon -speech recognition software & EMR  . Every effort was made to ensure accuracy; however, inadvertent computerized transcription errors may be present.     Thank you Joel Conteh MD for the opportunity to be involved in this patient's care. If you have any questions or concerns please feel free to contact me at [phone]. Other

## 2024-12-29 ENCOUNTER — APPOINTMENT (OUTPATIENT)
Dept: MRI IMAGING | Age: 79
DRG: 149 | End: 2024-12-29
Payer: MEDICARE

## 2024-12-29 ENCOUNTER — HOSPITAL ENCOUNTER (INPATIENT)
Age: 79
LOS: 1 days | Discharge: HOME | DRG: 149 | End: 2024-12-30
Attending: EMERGENCY MEDICINE
Payer: MEDICARE

## 2024-12-29 ENCOUNTER — APPOINTMENT (OUTPATIENT)
Dept: CT IMAGING | Age: 79
DRG: 149 | End: 2024-12-29
Payer: MEDICARE

## 2024-12-29 DIAGNOSIS — R55 NEAR SYNCOPE: ICD-10-CM

## 2024-12-29 DIAGNOSIS — R42 DIZZINESS: Primary | ICD-10-CM

## 2024-12-29 LAB
ALBUMIN SERPL-MCNC: 4.4 G/DL (ref 3.4–5)
ALBUMIN/GLOB SERPL: 1.6 {RATIO} (ref 1.1–2.2)
ALP SERPL-CCNC: 63 U/L (ref 40–129)
ALT SERPL-CCNC: 24 U/L (ref 10–40)
AST SERPL-CCNC: 26 U/L (ref 15–37)
BASOPHILS # BLD: 0 K/UL (ref 0–0.2)
BASOPHILS NFR BLD: 0.2 %
BILIRUB SERPL-MCNC: 0.3 MG/DL (ref 0–1)
BUN SERPL-MCNC: 24 MG/DL (ref 7–20)
CALCIUM SERPL-MCNC: 9.5 MG/DL (ref 8.3–10.6)
CHLORIDE SERPL-SCNC: 100 MMOL/L (ref 99–110)
CO2 SERPL-SCNC: 26 MMOL/L (ref 21–32)
CREAT SERPL-MCNC: 1 MG/DL (ref 0.8–1.3)
DEPRECATED RDW RBC AUTO: 12.8 % (ref 12.4–15.4)
EKG ATRIAL RATE: 64 BPM
EKG DIAGNOSIS: NORMAL
EKG P AXIS: 27 DEGREES
EKG Q-T INTERVAL: 460 MS
EKG QRS DURATION: 150 MS
EKG QTC CALCULATION (BAZETT): 474 MS
EKG R AXIS: 65 DEGREES
EKG T AXIS: 48 DEGREES
EKG VENTRICULAR RATE: 64 BPM
EOSINOPHIL # BLD: 0.3 K/UL (ref 0–0.6)
EOSINOPHIL NFR BLD: 3.8 %
GFR SERPLBLD CREATININE-BSD FMLA CKD-EPI: 75 ML/MIN/{1.73_M2}
GLUCOSE BLD-MCNC: 109 MG/DL
GLUCOSE BLD-MCNC: 109 MG/DL (ref 70–99)
GLUCOSE SERPL-MCNC: 123 MG/DL (ref 70–99)
HCT VFR BLD AUTO: 47.7 % (ref 40.5–52.5)
HGB BLD-MCNC: 16.1 G/DL (ref 13.5–17.5)
INR PPP: 0.92 (ref 0.85–1.15)
LYMPHOCYTES NFR BLD: 28.6 %
MCH RBC QN AUTO: 32.5 PG (ref 26–34)
MCHC RBC AUTO-ENTMCNC: 33.7 G/DL (ref 31–36)
MCV RBC AUTO: 96.4 FL (ref 80–100)
MONOCYTES NFR BLD: 10.4 %
NEUTROPHILS # BLD: 4.7 K/UL (ref 1.7–7.7)
NEUTROPHILS NFR BLD: 57 %
PERFORMED ON: ABNORMAL
PMV BLD AUTO: 6.7 FL (ref 5–10.5)
POTASSIUM SERPL-SCNC: 4.1 MMOL/L (ref 3.5–5.1)
RBC # BLD AUTO: 4.95 M/UL (ref 4.2–5.9)
SODIUM SERPL-SCNC: 138 MMOL/L (ref 136–145)
TROPONIN, HIGH SENSITIVITY: 7 NG/L (ref 0–22)
WBC # BLD AUTO: 8.2 K/UL (ref 4–11)

## 2024-12-29 PROCEDURE — 85610 PROTHROMBIN TIME: CPT

## 2024-12-29 PROCEDURE — 36415 COLL VENOUS BLD VENIPUNCTURE: CPT

## 2024-12-29 PROCEDURE — 93005 ELECTROCARDIOGRAM TRACING: CPT | Performed by: EMERGENCY MEDICINE

## 2024-12-29 PROCEDURE — 1200000000 HC SEMI PRIVATE

## 2024-12-29 PROCEDURE — 99285 EMERGENCY DEPT VISIT HI MDM: CPT

## 2024-12-29 PROCEDURE — 2580000003 HC RX 258: Performed by: EMERGENCY MEDICINE

## 2024-12-29 PROCEDURE — 6360000004 HC RX CONTRAST MEDICATION: Performed by: EMERGENCY MEDICINE

## 2024-12-29 PROCEDURE — 2500000003 HC RX 250 WO HCPCS

## 2024-12-29 PROCEDURE — 6370000000 HC RX 637 (ALT 250 FOR IP): Performed by: EMERGENCY MEDICINE

## 2024-12-29 PROCEDURE — 83036 HEMOGLOBIN GLYCOSYLATED A1C: CPT

## 2024-12-29 PROCEDURE — 80053 COMPREHEN METABOLIC PANEL: CPT

## 2024-12-29 PROCEDURE — 85025 COMPLETE CBC W/AUTO DIFF WBC: CPT

## 2024-12-29 PROCEDURE — 70450 CT HEAD/BRAIN W/O DYE: CPT

## 2024-12-29 PROCEDURE — 70551 MRI BRAIN STEM W/O DYE: CPT

## 2024-12-29 PROCEDURE — 6370000000 HC RX 637 (ALT 250 FOR IP)

## 2024-12-29 PROCEDURE — 84484 ASSAY OF TROPONIN QUANT: CPT

## 2024-12-29 PROCEDURE — 80061 LIPID PANEL: CPT

## 2024-12-29 PROCEDURE — 70498 CT ANGIOGRAPHY NECK: CPT

## 2024-12-29 PROCEDURE — 93010 ELECTROCARDIOGRAM REPORT: CPT | Performed by: INTERNAL MEDICINE

## 2024-12-29 PROCEDURE — 4A03X5D MEASUREMENT OF ARTERIAL FLOW, INTRACRANIAL, EXTERNAL APPROACH: ICD-10-PCS | Performed by: STUDENT IN AN ORGANIZED HEALTH CARE EDUCATION/TRAINING PROGRAM

## 2024-12-29 RX ORDER — PRAVASTATIN SODIUM 80 MG/1
80 TABLET ORAL DAILY
Status: DISCONTINUED | OUTPATIENT
Start: 2024-12-29 | End: 2024-12-30 | Stop reason: HOSPADM

## 2024-12-29 RX ORDER — SODIUM CHLORIDE 0.9 % (FLUSH) 0.9 %
5-40 SYRINGE (ML) INJECTION EVERY 12 HOURS SCHEDULED
Status: DISCONTINUED | OUTPATIENT
Start: 2024-12-29 | End: 2024-12-30 | Stop reason: HOSPADM

## 2024-12-29 RX ORDER — IOPAMIDOL 755 MG/ML
75 INJECTION, SOLUTION INTRAVASCULAR
Status: COMPLETED | OUTPATIENT
Start: 2024-12-29 | End: 2024-12-29

## 2024-12-29 RX ORDER — ENOXAPARIN SODIUM 100 MG/ML
40 INJECTION SUBCUTANEOUS DAILY
Status: DISCONTINUED | OUTPATIENT
Start: 2024-12-30 | End: 2024-12-30 | Stop reason: HOSPADM

## 2024-12-29 RX ORDER — METOPROLOL SUCCINATE 25 MG/1
25 TABLET, EXTENDED RELEASE ORAL NIGHTLY
Status: DISCONTINUED | OUTPATIENT
Start: 2024-12-30 | End: 2024-12-30 | Stop reason: HOSPADM

## 2024-12-29 RX ORDER — MECLIZINE HCL 12.5 MG 12.5 MG/1
25 TABLET ORAL ONCE
Status: COMPLETED | OUTPATIENT
Start: 2024-12-29 | End: 2024-12-29

## 2024-12-29 RX ORDER — MECLIZINE HCL 12.5 MG 12.5 MG/1
25 TABLET ORAL 3 TIMES DAILY PRN
Status: DISCONTINUED | OUTPATIENT
Start: 2024-12-29 | End: 2024-12-30 | Stop reason: HOSPADM

## 2024-12-29 RX ORDER — CETIRIZINE HYDROCHLORIDE 10 MG/1
20 TABLET ORAL 2 TIMES DAILY
Status: DISCONTINUED | OUTPATIENT
Start: 2024-12-29 | End: 2024-12-30 | Stop reason: HOSPADM

## 2024-12-29 RX ORDER — 0.9 % SODIUM CHLORIDE 0.9 %
1000 INTRAVENOUS SOLUTION INTRAVENOUS ONCE
Status: COMPLETED | OUTPATIENT
Start: 2024-12-29 | End: 2024-12-29

## 2024-12-29 RX ORDER — FAMOTIDINE 20 MG/1
20 TABLET, FILM COATED ORAL 2 TIMES DAILY
Status: DISCONTINUED | OUTPATIENT
Start: 2024-12-29 | End: 2024-12-30 | Stop reason: HOSPADM

## 2024-12-29 RX ORDER — SODIUM CHLORIDE 9 MG/ML
INJECTION, SOLUTION INTRAVENOUS PRN
Status: DISCONTINUED | OUTPATIENT
Start: 2024-12-29 | End: 2024-12-30 | Stop reason: HOSPADM

## 2024-12-29 RX ORDER — ONDANSETRON 4 MG/1
4 TABLET, ORALLY DISINTEGRATING ORAL EVERY 8 HOURS PRN
Status: DISCONTINUED | OUTPATIENT
Start: 2024-12-29 | End: 2024-12-30 | Stop reason: HOSPADM

## 2024-12-29 RX ORDER — SODIUM CHLORIDE 0.9 % (FLUSH) 0.9 %
5-40 SYRINGE (ML) INJECTION PRN
Status: DISCONTINUED | OUTPATIENT
Start: 2024-12-29 | End: 2024-12-30 | Stop reason: HOSPADM

## 2024-12-29 RX ORDER — ONDANSETRON 2 MG/ML
4 INJECTION INTRAMUSCULAR; INTRAVENOUS EVERY 6 HOURS PRN
Status: DISCONTINUED | OUTPATIENT
Start: 2024-12-29 | End: 2024-12-30 | Stop reason: HOSPADM

## 2024-12-29 RX ORDER — ASPIRIN 81 MG/1
81 TABLET ORAL 2 TIMES DAILY
Status: DISCONTINUED | OUTPATIENT
Start: 2024-12-29 | End: 2024-12-30 | Stop reason: HOSPADM

## 2024-12-29 RX ORDER — DOXEPIN HYDROCHLORIDE 25 MG/1
100 CAPSULE ORAL NIGHTLY
Status: DISCONTINUED | OUTPATIENT
Start: 2024-12-29 | End: 2024-12-30 | Stop reason: HOSPADM

## 2024-12-29 RX ORDER — POLYETHYLENE GLYCOL 3350 17 G/17G
17 POWDER, FOR SOLUTION ORAL DAILY PRN
Status: DISCONTINUED | OUTPATIENT
Start: 2024-12-29 | End: 2024-12-30 | Stop reason: HOSPADM

## 2024-12-29 RX ADMIN — DOXEPIN HYDROCHLORIDE 100 MG: 25 CAPSULE ORAL at 20:15

## 2024-12-29 RX ADMIN — Medication 10 ML: at 20:14

## 2024-12-29 RX ADMIN — IOPAMIDOL 75 ML: 755 INJECTION, SOLUTION INTRAVENOUS at 11:26

## 2024-12-29 RX ADMIN — SODIUM CHLORIDE 1000 ML: 9 INJECTION, SOLUTION INTRAVENOUS at 11:51

## 2024-12-29 RX ADMIN — MECLIZINE 25 MG: 12.5 TABLET ORAL at 11:53

## 2024-12-29 RX ADMIN — ASPIRIN 81 MG: 81 TABLET, COATED ORAL at 20:15

## 2024-12-29 ASSESSMENT — PAIN DESCRIPTION - PAIN TYPE: TYPE: ACUTE PAIN

## 2024-12-29 ASSESSMENT — LIFESTYLE VARIABLES
HOW OFTEN DO YOU HAVE A DRINK CONTAINING ALCOHOL: NEVER
HOW MANY STANDARD DRINKS CONTAINING ALCOHOL DO YOU HAVE ON A TYPICAL DAY: PATIENT DOES NOT DRINK

## 2024-12-29 ASSESSMENT — PAIN - FUNCTIONAL ASSESSMENT: PAIN_FUNCTIONAL_ASSESSMENT: 0-10

## 2024-12-29 ASSESSMENT — PAIN SCALES - GENERAL: PAINLEVEL_OUTOF10: 0

## 2024-12-29 NOTE — ED PROVIDER NOTES
16 12   Temp: 98.3 °F (36.8 °C)      TempSrc: Oral      SpO2: 95% 95%  94%   Weight: 86.2 kg (190 lb)      Height: 1.676 m (5' 6\")          Patient was treated with and given the following medications:  Medications   sodium chloride 0.9 % bolus 1,000 mL (0 mLs IntraVENous Stopped 12/29/24 1254)   meclizine (ANTIVERT) tablet 25 mg (25 mg Oral Given 12/29/24 1153)   iopamidol (ISOVUE-370) 76 % injection 75 mL (75 mLs IntraVENous Given 12/29/24 1126)             Is this patient to be included in the SEP-1 Core Measure due to severe sepsis or septic shock?   No   Exclusion criteria - the patient is NOT to be included for SEP-1 Core Measure due to:  Infection is not suspected    CC/HPI Summary, DDx, ED Course, and Reassessment:     79-year-old male presenting for evaluation of dizziness, started about an hour ago.  Code stroke was called upon his arrival.  He has no focal neurologic deficits he continues to have some mild lightheadedness/dizziness he has no nystagmus or focal neurologic deficit, NIH stroke scale score 0.  Code stroke was called he will be ordered for laboratory evaluation, CT head, CT imaging head and neck for further evaluation he will be ordered fluids, meclizine    The differential diagnosis associated with the patient's presentation includes, but is not limited to: Peripheral vertigo, central vertigo, CVA, large vessel occlusion, electrolyte abnormality, dehydration,    CONSULTS: (Who and What was discussed)  None    Discussion with Other Professionals : Admitting Team   and Consultant      Management of the patient was discussed with  stroke team physician, Dr. Arreola.  Advised as patient has no focal neurologic deficits, unless patient has large vessel occlusion as he has minimal symptoms likely would not be a candidate for thrombolysis.  CTA shows no evidence of large vessel occlusion, there is no evidence of vertebrobasilar insufficiency or dissection.    CT head, CTA imaging without acute

## 2024-12-29 NOTE — ED NOTES
Joshua Stroke   @1102  called Code Stroke   @1102 Called Ct  @1102Called  Stroke Team  @1102 Called Lab   @1107 casandra called back and spoke with

## 2024-12-29 NOTE — CONSULTS
Neuro Consult Placed     Who: Dr. Krishna  Date: 12/29/24  Time: 1720     Electronically signed by Dorothy Arguelles RN on 12/29/2024 at 5:20 PM

## 2024-12-29 NOTE — ED NOTES
Derrick Paredes is a 79 y.o. male admitted for  Principal Problem:    Dizziness  Resolved Problems:    * No resolved hospital problems. *  .   Patient Home via family with   Chief Complaint   Patient presents with    Dizziness     STARTED 1 HOUR AGO. STATES HE FEELS LIKE HE WAS GOING TO PASS OUT   .  Patient is alert and Person, Place, Time, and Situation  Patient's baseline mobility: Baseline Mobility: Independent   Code Status: Prior   Cardiac Rhythm:       Is patient on baseline Oxygen: no how many Liters:   Abnormal Assessment Findings: dizziness, no dizziness now.     Isolation: None      NIH Score:    C-SSRS: Risk of Suicide: No Risk  Bedside swallow:        Active LDA's:   Peripheral IV 12/29/24 Proximal;Right;Anterior Forearm (Active)     Patient admitted with a valenzuela: no If the valenzuela is chronic was it exchanged:NA  Reason for valenzuela:   Patient admitted with Central Line:  . PICC line placement confirmed: YES OR NO:[id number]}   Reason for Central line:   Was central line Inserted from an outside facility:        Family/Caregiver Present no Any Concerns: no   Restraints no  Sitter no         Vitals: MEWS Score: 1    Vitals:    12/29/24 1135 12/29/24 1255 12/29/24 1256 12/29/24 1430   BP: (!) 153/71 (!) 140/83  137/73   Pulse: 68 66 64 65   Resp: 13 16 12 15   Temp:       TempSrc:       SpO2: 95%  94% 94%   Weight:       Height:           Last documented pain score (0-10 scale) Pain Level: 0  Pain medication administered No.    Pertinent or High Risk Medications/Drips: No.    Pending Blood Product Administration: no    Abnormal labs:   Abnormal Labs Reviewed   COMPREHENSIVE METABOLIC PANEL W/ REFLEX TO MG FOR LOW K - Abnormal; Notable for the following components:       Result Value    Glucose 123 (*)     BUN 24 (*)     All other components within normal limits   POCT GLUCOSE - Abnormal; Notable for the following components:    POC Glucose 109 (*)     All other components within normal limits     Critical values:

## 2024-12-30 VITALS
HEIGHT: 66 IN | SYSTOLIC BLOOD PRESSURE: 125 MMHG | BODY MASS INDEX: 30.53 KG/M2 | TEMPERATURE: 98.2 F | RESPIRATION RATE: 16 BRPM | HEART RATE: 73 BPM | WEIGHT: 190 LBS | DIASTOLIC BLOOD PRESSURE: 75 MMHG | OXYGEN SATURATION: 95 %

## 2024-12-30 LAB
ANION GAP SERPL CALCULATED.3IONS-SCNC: 10 MMOL/L (ref 3–16)
BUN SERPL-MCNC: 21 MG/DL (ref 7–20)
CALCIUM SERPL-MCNC: 9 MG/DL (ref 8.3–10.6)
CHLORIDE SERPL-SCNC: 107 MMOL/L (ref 99–110)
CHOLEST SERPL-MCNC: 172 MG/DL (ref 0–199)
CO2 SERPL-SCNC: 25 MMOL/L (ref 21–32)
CREAT SERPL-MCNC: 1 MG/DL (ref 0.8–1.3)
DEPRECATED RDW RBC AUTO: 12.9 % (ref 12.4–15.4)
EST. AVERAGE GLUCOSE BLD GHB EST-MCNC: 137 MG/DL
GFR SERPLBLD CREATININE-BSD FMLA CKD-EPI: 75 ML/MIN/{1.73_M2}
GLUCOSE SERPL-MCNC: 100 MG/DL (ref 70–99)
HBA1C MFR BLD: 6.4 %
HCT VFR BLD AUTO: 45.5 % (ref 40.5–52.5)
HDLC SERPL-MCNC: 42 MG/DL (ref 40–60)
HGB BLD-MCNC: 15.5 G/DL (ref 13.5–17.5)
LDLC SERPL CALC-MCNC: 89 MG/DL
MCH RBC QN AUTO: 32.6 PG (ref 26–34)
MCHC RBC AUTO-ENTMCNC: 34.2 G/DL (ref 31–36)
MCV RBC AUTO: 95.6 FL (ref 80–100)
PLATELET # BLD AUTO: 253 K/UL (ref 135–450)
PMV BLD AUTO: 6.7 FL (ref 5–10.5)
POTASSIUM SERPL-SCNC: 4 MMOL/L (ref 3.5–5.1)
RBC # BLD AUTO: 4.76 M/UL (ref 4.2–5.9)
SODIUM SERPL-SCNC: 142 MMOL/L (ref 136–145)
TRIGL SERPL-MCNC: 205 MG/DL (ref 0–150)
VLDLC SERPL CALC-MCNC: 41 MG/DL
WBC # BLD AUTO: 5.3 K/UL (ref 4–11)

## 2024-12-30 PROCEDURE — 85027 COMPLETE CBC AUTOMATED: CPT

## 2024-12-30 PROCEDURE — 6370000000 HC RX 637 (ALT 250 FOR IP)

## 2024-12-30 PROCEDURE — APPSS45 APP SPLIT SHARED TIME 31-45 MINUTES

## 2024-12-30 PROCEDURE — 80048 BASIC METABOLIC PNL TOTAL CA: CPT

## 2024-12-30 PROCEDURE — 6360000002 HC RX W HCPCS

## 2024-12-30 PROCEDURE — 99222 1ST HOSP IP/OBS MODERATE 55: CPT | Performed by: STUDENT IN AN ORGANIZED HEALTH CARE EDUCATION/TRAINING PROGRAM

## 2024-12-30 PROCEDURE — 97161 PT EVAL LOW COMPLEX 20 MIN: CPT

## 2024-12-30 PROCEDURE — 2500000003 HC RX 250 WO HCPCS

## 2024-12-30 PROCEDURE — 92610 EVALUATE SWALLOWING FUNCTION: CPT

## 2024-12-30 PROCEDURE — 36415 COLL VENOUS BLD VENIPUNCTURE: CPT

## 2024-12-30 RX ORDER — METOPROLOL SUCCINATE 25 MG/1
25 TABLET, EXTENDED RELEASE ORAL NIGHTLY
COMMUNITY
Start: 2024-12-30

## 2024-12-30 RX ORDER — EZETIMIBE 10 MG/1
10 TABLET ORAL NIGHTLY
Status: DISCONTINUED | OUTPATIENT
Start: 2024-12-30 | End: 2024-12-30 | Stop reason: HOSPADM

## 2024-12-30 RX ORDER — EZETIMIBE 10 MG/1
10 TABLET ORAL NIGHTLY
Qty: 30 TABLET | Refills: 3 | Status: SHIPPED | OUTPATIENT
Start: 2024-12-30

## 2024-12-30 RX ADMIN — Medication 10 ML: at 09:22

## 2024-12-30 RX ADMIN — ENOXAPARIN SODIUM 40 MG: 100 INJECTION SUBCUTANEOUS at 09:21

## 2024-12-30 RX ADMIN — PRAVASTATIN SODIUM 80 MG: 80 TABLET ORAL at 09:21

## 2024-12-30 RX ADMIN — ASPIRIN 81 MG: 81 TABLET, COATED ORAL at 09:21

## 2024-12-30 NOTE — CONSULTS
Derrick Paredes  Inpatient Neurology Consult  [facility] Neurology            Derrick Paredes  1945    Date of Service: 12/30/2024    Referring Physician: Caesar Hernandez MD      Reason for the consult and CC: Dizziness    HPI:   The patient is a 79 y.o.  years old male with a prior medical history of CAD, diabetes, hyperlipidemia, hypertension, PTSD who presents to the hospital on 12/29 with dizziness.  Patient states that 1 hour prior to arrival he was at [facility] when he started \"not feeling right\".  He describes this as unsteadiness.  He was able to drive home although he was nervous and says that his symptoms worsened when he got home.  He says that he became lightheaded and that his symptoms lasted for 7-8 hours. He now feels back to baseline. Patient denies any room spinning or focal neurologic signs during the spell.  Patient has had 2 prior spells of vertigo in the last several years for which he received vestibular rehab and his symptoms resolved.  Patient has also had a history of a panic attack many years ago and says that his most recent symptoms are more similar to that than the vertigo.  Patient mentions that he has a history of PTSD and [facility]  He says that he has nightmares at times, although he does not recall having a nightmare the night before this happened.  I asked if he was anxious during the spell and he says that he is not sure what that feels like.  Patient does endorse some left ear hearing loss about 2 months ago but has been seen outpatient by ENT for this. CT head, CTA head/neck and MRI brain all revealed no acute findings.         Constitutional:   Vitals:    12/30/24 0350 12/30/24 0830 12/30/24 0915 12/30/24 1217   BP: 114/68 (!) 155/92 126/71 125/75   Pulse: 66 77 81 73   Resp: 16  16 16   Temp: 97.5 °F (36.4 °C)  97.5 °F (36.4 °C) 98.2 °F (36.8 °C)   TempSrc: Oral  Oral Oral   SpO2: 95% 94% 95% 95%   Weight:       Height:             I personally

## 2024-12-30 NOTE — DISCHARGE SUMMARY
Hospital Medicine Discharge Summary    Patient: Derrick Paredes   : 1945     Admit Date: 2024   Discharge Date: 2024    Disposition:  [x]Home   []HHC  []SNF  []Acute Rehab  []LTAC  []Hospice  Code status:  [x]Full  []DNR/CCA  []Limited (DNR/CCA with Do Not Intubate)  []DNRCC  Condition at Discharge: Stable  Primary Care Provider: Sidney Kelley MD    Admitting Provider: Angela Hoyt DO  Discharge Provider: Sidney Escalante, APRN - CNP     Discharge Diagnoses:      Active Hospital Problems    Diagnosis     Dizziness [R42]        Presenting Admission History:      79 y.o. male with past medical history significant for  vertigo, CAD, diabetes, hypertension, hyperlipidemia, OMKAR, and anxiety who presented to [facility] with dizziness that started  morning of arrival around 0930 when he was at breakfast with friends.  He began to feel \"foggy\" so went home.  Once there he reports feeling dizzy and checking his blood pressure, states it was 180/90.  He reports this dizziness is not room spinning like with his vertigo, but is different.  He denied association with headache, nausea, vomiting, diaphoresis.  Reports BLE weakness, and neuropathy (this is chronic).  No aggravating or alleviating factors.  No radiation of symptoms.  Did not improve with 1L bolus and meclizine in the ED,  consulted for admission     Dizziness possible vertigo: CT head: no acute intracranial abnormality.  CTA head/neck: no LVO.  MRI brain: no acute infarct.  Continue ASA, statin.  Neuro has been consulted.  PT/OT/SLP consulted.  Stroke order set is in place.   A1c 6.4, lipid panel-  Meclizine PRN.  orthostatic VS stable  DVT prophylaxis: enoxaparin.        Essential hypertension.  Normally on toprol-xl at home.  Acute stroke has been ruled out, so will continue this medication.  Monitor     CAD s/p PCI-follows with Dr. Ramos, kenia neg x1, no cp/sob. SR RBB. ASA, STATIN , BB     Hyperlipidemia.  No recent lipid panel

## 2024-12-30 NOTE — CARE COORDINATION
Patient expects to discharge to: House  Plan for transportation at discharge: Family    Financial    Payor: MEDICARE / Plan: MEDICARE PART A AND B / Product Type: *No Product type* /     Does insurance require precert for SNF: No    Potential assistance Purchasing Medications: No  Meds-to-Beds request:        [facility] [id number] [facility] [address] - P [phone] - F [phone]  [address] [facility] [address]  Phone: [phone] Fax: [phone]    [facility] [id number] [place], [place] - [address] -  [phone] - F [phone]  [address] [facility] [address]  Phone: [phone] Fax: [phone]      Notes:    Factors facilitating achievement of predicted outcomes: Family support, Motivated, Cooperative, Pleasant, and Sense of humor    Barriers to discharge: wrkup    Additional Case Management Notes: Pt IPTA in home w/spouse. Pt drives and ambulates in the community. Active PCP and Insurance. Pt declined needs. Will follow for needs as they arise.     The Plan for Transition of Care is related to the following treatment goals of Dizziness [R42]  Near syncope [R55]    IF APPLICABLE: The Patient and/or patient representative Derrick and his family were provided with a choice of provider and agrees with the discharge plan. Freedom of choice list with basic dialogue that supports the patient's individualized plan of care/goals and shares the quality data associated with the providers was provided to:     Patient Representative Name:       The Patient and/or Patient Representative Agree with the Discharge Plan?      PATRICIA BARRY RN  Case Management Department

## 2024-12-30 NOTE — PLAN OF CARE
Problem: Chronic Conditions and Co-morbidities  Goal: Patient's chronic conditions and co-morbidity symptoms are monitored and maintained or improved  Outcome: Progressing  Flowsheets (Taken 12/29/2024 2011)  Care Plan - Patient's Chronic Conditions and Co-Morbidity Symptoms are Monitored and Maintained or Improved: Monitor and assess patient's chronic conditions and comorbid symptoms for stability, deterioration, or improvement     Problem: Discharge Planning  Goal: Discharge to home or other facility with appropriate resources  12/29/2024 2231 by Nikki Perez, RN  Outcome: Progressing  Flowsheets (Taken 12/29/2024 2011)  Discharge to home or other facility with appropriate resources: Identify barriers to discharge with patient and caregiver  12/29/2024 1813 by Waleska Escobedo, RN  Outcome: Progressing     Problem: Safety - Adult  Goal: Free from fall injury  Outcome: Progressing

## 2024-12-30 NOTE — PROGRESS NOTES
Occupational Therapy  OT orders received. Spoke with physical therapist, reported IND with therapy at this time, no acute OT needs noted. Please see PT eval for d/c recs. Will sign off.     Nancy Mendez, OTR/L  
Physical Therapy  Facility/Department: [place] [id number] - MED SURG/ORTHO  Physical Therapy Initial Assessment & Discharge Summary    Name: Derrick Paredes  : 1945  MRN: [id number]  Date of Service: 2024    Discharge Recommendations:  Home independently   PT Equipment Recommendations  Equipment Needed: No      Patient Diagnosis(es): The primary encounter diagnosis was Dizziness. A diagnosis of Near syncope was also pertinent to this visit.  Past Medical History:  has a past medical history of Arthritis, CAD (coronary artery disease), Diabetes mellitus (HCC), Hyperlipidemia, Hypertension, Panic anxiety syndrome, and Sleep apnea.  Past Surgical History:  has a past surgical history that includes Hernia repair; Cholecystectomy; Appendectomy; Colonoscopy (2010); Colonoscopy (2015); Upper gastrointestinal endoscopy (N/A, 2021); Cardiac surgery; and Ankle fracture surgery (Right, 2022).    Assessment  Body Structures, Functions, Activity Limitations Requiring Skilled Therapeutic Intervention: Decreased functional mobility   Assessment: Patient seen for PT evaluation.  Patient cleared by RN for therapy participation this date.  Patient agreeable to therapy. Patient admitted for dizziness. Pt previously independent. Patient completed transfers, gait, and stairs independently. Pt denies concerns with mobility and dizziness during session. Pt's strength and balance WFL. PT eval only. Recommending DC to home independently.  Treatment Diagnosis: decreased mobility  Therapy Prognosis: Excellent  Decision Making: Low Complexity  Barriers to Learning: none  Requires PT Follow-Up: No  Activity Tolerance  Activity Tolerance: Patient tolerated evaluation without incident    Plan  Physical Therapy Plan  General Plan: Discharge with evaluation only  Safety Devices  Type of Devices: Call light within reach, Left in chair  Restraints  Restraints Initially in Place: 
vomiting    Lisinopril Other (See Comments) and Cough     cough  cough      Prednisone Other (See Comments)     Insomnia, hyper, \"skin crawling\"       DATE ONSET: 12/29/2024    Date of Evaluation: 12/30/2024   Evaluating Therapist: Snehal Jovel SLP    Chart Reviewed: : [x] Yes [] No     Pain: The patient does not complain of pain     Current Diet: ADULT DIET; Regular      Most Recent Imaging    MRI BRAIN WO CONTRAST   Final Result   No acute intracranial abnormality.      Mild chronic microvascular disease.         CTA HEAD NECK W CONTRAST   Final Result   No apparent arterial high grade stenosis, occlusion or aneurysm within the   head or neck.         CT HEAD WO CONTRAST   Final Result   No acute intracranial abnormality.      Atrophy and small-vessel ischemic change      Paranasal sinus disease      RECOMMENDATIONS:   Results discussed with TEA BORRERO by Edgar Neal MD at 11:19 am on   12/29/2024               Reason for Referral  Derrick Paredes was referred for a bedside swallow evaluation to assess the efficiency of their swallow function, identify signs and symptoms of aspiration and make recommendations regarding safe dietary consistencies, effective compensatory strategies, and safe eating environment.    Assessment    Medical record review/interview: Per MD H&P on 12/29/24: \"79 y.o. male with past medical history significant for  vertigo, CAD, diabetes, hypertension, hyperlipidemia, OMKAR, anxiety.  Presented to [facility] with dizziness that started this morning around 0930 when he was at breakfast with friends.  He began to feel \"foggy\" so went home.  Once there he reports feeling dizzy and checking his blood pressure, states it was 180/90.  He reports this dizziness is not room spinning like with his vertigo, but is different.  He denies association with headache, nausea, vomiting, diaphoresis.  Reports BLE weakness, and neuropathy (this is chronic).  No aggravating or alleviating

## 2024-12-30 NOTE — PLAN OF CARE
Problem: Chronic Conditions and Co-morbidities  Goal: Patient's chronic conditions and co-morbidity symptoms are monitored and maintained or improved  Outcome: Adequate for Discharge  Flowsheets (Taken 12/30/2024 0800)  Care Plan - Patient's Chronic Conditions and Co-Morbidity Symptoms are Monitored and Maintained or Improved: Monitor and assess patient's chronic conditions and comorbid symptoms for stability, deterioration, or improvement     Problem: Discharge Planning  Goal: Discharge to home or other facility with appropriate resources  12/30/2024 1608 by Waleska Escobedo RN  Outcome: Adequate for Discharge  12/30/2024 1037 by Waleska Escobedo RN  Outcome: Progressing     Problem: Safety - Adult  Goal: Free from fall injury  12/30/2024 1608 by Waleska Escobedo RN  Outcome: Adequate for Discharge  12/30/2024 1037 by Waleska Escobedo RN  Outcome: Progressing

## 2024-12-30 NOTE — DISCHARGE SUMMARY
Pt discharged home with wife. Medication picked up from pharmacy. Discharge instructions and follow up apt explained. Pt denies any further questions at this time.

## 2024-12-30 NOTE — PLAN OF CARE
SLP completed evaluation. Please refer to notes in EMR.      Thank you,   Snehal Jovel M.A. CCC-SLP   Speech-Language Pathologist

## 2024-12-30 NOTE — PLAN OF CARE
Problem: Discharge Planning  Goal: Discharge to home or other facility with appropriate resources  12/30/2024 1037 by Waleska Escobedo, RN  Outcome: Progressing    Problem: Safety - Adult  Goal: Free from fall injury  12/30/2024 1037 by Waleska Escobedo, RN  Outcome: Progressing

## 2025-02-22 ENCOUNTER — APPOINTMENT (OUTPATIENT)
Dept: CT IMAGING | Age: 80
End: 2025-02-22
Payer: MEDICARE

## 2025-02-22 ENCOUNTER — HOSPITAL ENCOUNTER (INPATIENT)
Age: 80
LOS: 2 days | Discharge: HOME | DRG: 881 | End: 2025-02-24
Attending: PSYCHIATRY & NEUROLOGY | Admitting: PSYCHIATRY & NEUROLOGY
Payer: MEDICARE

## 2025-02-22 ENCOUNTER — HOSPITAL ENCOUNTER (EMERGENCY)
Age: 80
Discharge: ANOTHER HOSPITAL | End: 2025-02-22
Attending: EMERGENCY MEDICINE
Payer: MEDICARE

## 2025-02-22 VITALS
HEART RATE: 65 BPM | DIASTOLIC BLOOD PRESSURE: 90 MMHG | HEIGHT: 66 IN | WEIGHT: 180 LBS | RESPIRATION RATE: 19 BRPM | SYSTOLIC BLOOD PRESSURE: 121 MMHG | BODY MASS INDEX: 28.93 KG/M2 | TEMPERATURE: 98.9 F | OXYGEN SATURATION: 99 %

## 2025-02-22 DIAGNOSIS — R94.31 ABNORMAL EKG: Primary | ICD-10-CM

## 2025-02-22 DIAGNOSIS — F41.1 ANXIETY STATE: Primary | ICD-10-CM

## 2025-02-22 PROBLEM — F33.9 MAJOR DEPRESSION, RECURRENT: Status: ACTIVE | Noted: 2025-02-22

## 2025-02-22 PROBLEM — F32.9 MAJOR DEPRESSION, SINGLE EPISODE: Status: ACTIVE | Noted: 2025-02-22

## 2025-02-22 LAB
ALBUMIN SERPL-MCNC: 4.4 G/DL (ref 3.4–5)
ALBUMIN/GLOB SERPL: 1.6 {RATIO} (ref 1.1–2.2)
ALP SERPL-CCNC: 62 U/L (ref 40–129)
ALT SERPL-CCNC: 29 U/L (ref 10–40)
AMPHETAMINES UR QL SCN>1000 NG/ML: NORMAL
ANION GAP SERPL CALCULATED.3IONS-SCNC: 12 MMOL/L (ref 3–16)
AST SERPL-CCNC: 25 U/L (ref 15–37)
BACTERIA URNS QL MICRO: ABNORMAL /HPF
BARBITURATES UR QL SCN>200 NG/ML: NORMAL
BASOPHILS # BLD: 0 K/UL (ref 0–0.2)
BASOPHILS NFR BLD: 0.2 %
BENZODIAZ UR QL SCN>200 NG/ML: NORMAL
BILIRUB SERPL-MCNC: 0.4 MG/DL (ref 0–1)
BILIRUB UR QL STRIP.AUTO: NEGATIVE
BUN SERPL-MCNC: 24 MG/DL (ref 7–20)
CALCIUM SERPL-MCNC: 9.4 MG/DL (ref 8.3–10.6)
CANNABINOIDS UR QL SCN>50 NG/ML: NORMAL
CHLORIDE SERPL-SCNC: 101 MMOL/L (ref 99–110)
CLARITY UR: CLEAR
CO2 SERPL-SCNC: 25 MMOL/L (ref 21–32)
COCAINE UR QL SCN: NORMAL
COLOR UR: YELLOW
CREAT SERPL-MCNC: 1.1 MG/DL (ref 0.8–1.3)
DEPRECATED RDW RBC AUTO: 12.6 % (ref 12.4–15.4)
DRUG SCREEN COMMENT UR-IMP: NORMAL
EOSINOPHIL # BLD: 0 K/UL (ref 0–0.6)
EOSINOPHIL NFR BLD: 0 %
EPI CELLS #/AREA URNS HPF: ABNORMAL /HPF (ref 0–5)
ETHANOLAMINE SERPL-MCNC: NORMAL MG/DL (ref 0–0.08)
FENTANYL SCREEN, URINE: NORMAL
FLUAV RNA RESP QL NAA+PROBE: NOT DETECTED
FLUBV RNA RESP QL NAA+PROBE: NOT DETECTED
GFR SERPLBLD CREATININE-BSD FMLA CKD-EPI: 67 ML/MIN/{1.73_M2}
GLUCOSE SERPL-MCNC: 139 MG/DL (ref 70–99)
GLUCOSE UR STRIP.AUTO-MCNC: NEGATIVE MG/DL
HCT VFR BLD AUTO: 48.3 % (ref 40.5–52.5)
HGB BLD-MCNC: 16 G/DL (ref 13.5–17.5)
HGB UR QL STRIP.AUTO: ABNORMAL
KETONES UR STRIP.AUTO-MCNC: NEGATIVE MG/DL
LEUKOCYTE ESTERASE UR QL STRIP.AUTO: NEGATIVE
LYMPHOCYTES # BLD: 0.8 K/UL (ref 1–5.1)
LYMPHOCYTES NFR BLD: 6.7 %
MCH RBC QN AUTO: 31.8 PG (ref 26–34)
MCHC RBC AUTO-ENTMCNC: 33.1 G/DL (ref 31–36)
MCV RBC AUTO: 96.2 FL (ref 80–100)
METHADONE UR QL SCN>300 NG/ML: NORMAL
MONOCYTES # BLD: 1 K/UL (ref 0–1.3)
MONOCYTES NFR BLD: 7.6 %
NEUTROPHILS # BLD: 10.7 K/UL (ref 1.7–7.7)
NEUTROPHILS NFR BLD: 85.5 %
NITRITE UR QL STRIP.AUTO: NEGATIVE
OPIATES UR QL SCN>300 NG/ML: NORMAL
OXYCODONE UR QL SCN: NORMAL
PCP UR QL SCN>25 NG/ML: NORMAL
PH UR STRIP.AUTO: 5.5 [PH] (ref 5–8)
PH UR STRIP: 6 [PH]
PLATELET # BLD AUTO: 360 K/UL (ref 135–450)
PMV BLD AUTO: 6.3 FL (ref 5–10.5)
POTASSIUM SERPL-SCNC: 4.1 MMOL/L (ref 3.5–5.1)
PROT SERPL-MCNC: 7.1 G/DL (ref 6.4–8.2)
PROT UR STRIP.AUTO-MCNC: NEGATIVE MG/DL
RBC # BLD AUTO: 5.01 M/UL (ref 4.2–5.9)
RBC #/AREA URNS HPF: ABNORMAL /HPF (ref 0–4)
SARS-COV-2 RNA RESP QL NAA+PROBE: NOT DETECTED
SODIUM SERPL-SCNC: 138 MMOL/L (ref 136–145)
SP GR UR STRIP.AUTO: 1.01 (ref 1–1.03)
UA COMPLETE W REFLEX CULTURE PNL UR: ABNORMAL
UA DIPSTICK W REFLEX MICRO PNL UR: YES
URN SPEC COLLECT METH UR: ABNORMAL
UROBILINOGEN UR STRIP-ACNC: 0.2 E.U./DL
WBC # BLD AUTO: 12.5 K/UL (ref 4–11)
WBC #/AREA URNS HPF: ABNORMAL /HPF (ref 0–5)

## 2025-02-22 PROCEDURE — 87636 SARSCOV2 & INF A&B AMP PRB: CPT

## 2025-02-22 PROCEDURE — 82077 ASSAY SPEC XCP UR&BREATH IA: CPT

## 2025-02-22 PROCEDURE — 85025 COMPLETE CBC W/AUTO DIFF WBC: CPT

## 2025-02-22 PROCEDURE — 1240000000 HC EMOTIONAL WELLNESS R&B

## 2025-02-22 PROCEDURE — 74176 CT ABD & PELVIS W/O CONTRAST: CPT

## 2025-02-22 PROCEDURE — 99285 EMERGENCY DEPT VISIT HI MDM: CPT

## 2025-02-22 PROCEDURE — 80307 DRUG TEST PRSMV CHEM ANLYZR: CPT

## 2025-02-22 PROCEDURE — 81001 URINALYSIS AUTO W/SCOPE: CPT

## 2025-02-22 PROCEDURE — 90791 PSYCH DIAGNOSTIC EVALUATION: CPT

## 2025-02-22 PROCEDURE — 80053 COMPREHEN METABOLIC PANEL: CPT

## 2025-02-22 RX ORDER — POLYETHYLENE GLYCOL 3350 17 G
2 POWDER IN PACKET (EA) ORAL
Status: DISCONTINUED | OUTPATIENT
Start: 2025-02-22 | End: 2025-02-24 | Stop reason: HOSPADM

## 2025-02-22 RX ORDER — BENZTROPINE MESYLATE 1 MG/ML
2 INJECTION, SOLUTION INTRAMUSCULAR; INTRAVENOUS 2 TIMES DAILY PRN
Status: DISCONTINUED | OUTPATIENT
Start: 2025-02-22 | End: 2025-02-24 | Stop reason: HOSPADM

## 2025-02-22 RX ORDER — OLANZAPINE 5 MG/1
5 TABLET ORAL EVERY 4 HOURS PRN
Status: DISCONTINUED | OUTPATIENT
Start: 2025-02-22 | End: 2025-02-24 | Stop reason: HOSPADM

## 2025-02-22 RX ORDER — NICOTINE 21 MG/24HR
1 PATCH, TRANSDERMAL 24 HOURS TRANSDERMAL DAILY
Status: DISCONTINUED | OUTPATIENT
Start: 2025-02-22 | End: 2025-02-23

## 2025-02-22 RX ORDER — MAGNESIUM HYDROXIDE/ALUMINUM HYDROXICE/SIMETHICONE 120; 1200; 1200 MG/30ML; MG/30ML; MG/30ML
30 SUSPENSION ORAL EVERY 6 HOURS PRN
Status: DISCONTINUED | OUTPATIENT
Start: 2025-02-22 | End: 2025-02-24 | Stop reason: HOSPADM

## 2025-02-22 RX ORDER — IBUPROFEN 400 MG/1
400 TABLET, FILM COATED ORAL EVERY 6 HOURS PRN
Status: DISCONTINUED | OUTPATIENT
Start: 2025-02-22 | End: 2025-02-24 | Stop reason: HOSPADM

## 2025-02-22 RX ORDER — TRAZODONE HYDROCHLORIDE 50 MG/1
50 TABLET ORAL NIGHTLY PRN
Status: DISCONTINUED | OUTPATIENT
Start: 2025-02-22 | End: 2025-02-24 | Stop reason: HOSPADM

## 2025-02-22 RX ORDER — HYDROXYZINE HYDROCHLORIDE 50 MG/1
50 TABLET, FILM COATED ORAL 3 TIMES DAILY PRN
Status: DISCONTINUED | OUTPATIENT
Start: 2025-02-22 | End: 2025-02-24 | Stop reason: HOSPADM

## 2025-02-22 RX ORDER — ACETAMINOPHEN 325 MG/1
650 TABLET ORAL EVERY 4 HOURS PRN
Status: DISCONTINUED | OUTPATIENT
Start: 2025-02-22 | End: 2025-02-24 | Stop reason: HOSPADM

## 2025-02-22 SDOH — ECONOMIC STABILITY: INCOME INSECURITY: IN THE LAST 12 MONTHS, WAS THERE A TIME WHEN YOU WERE NOT ABLE TO PAY THE MORTGAGE OR RENT ON TIME?: NO

## 2025-02-22 SDOH — ECONOMIC STABILITY: TRANSPORTATION INSECURITY
IN THE PAST 12 MONTHS, HAS LACK OF TRANSPORTATION KEPT YOU FROM MEETINGS, WORK, OR FROM GETTING THINGS NEEDED FOR DAILY LIVING?: NO

## 2025-02-22 SDOH — ECONOMIC STABILITY: FOOD INSECURITY: WITHIN THE PAST 12 MONTHS, THE FOOD YOU BOUGHT JUST DIDN'T LAST AND YOU DIDN'T HAVE MONEY TO GET MORE.: NEVER TRUE

## 2025-02-22 SDOH — HEALTH STABILITY: MENTAL HEALTH: HOW OFTEN DO YOU HAVE A DRINK CONTAINING ALCOHOL?: NEVER

## 2025-02-22 SDOH — ECONOMIC STABILITY: TRANSPORTATION INSECURITY
IN THE PAST 12 MONTHS, HAS THE LACK OF TRANSPORTATION KEPT YOU FROM MEDICAL APPOINTMENTS OR FROM GETTING MEDICATIONS?: NO

## 2025-02-22 SDOH — ECONOMIC STABILITY: INCOME INSECURITY: HOW HARD IS IT FOR YOU TO PAY FOR THE VERY BASICS LIKE FOOD, HOUSING, MEDICAL CARE, AND HEATING?: NOT HARD AT ALL

## 2025-02-22 SDOH — HEALTH STABILITY: MENTAL HEALTH: HOW MANY STANDARD DRINKS CONTAINING ALCOHOL DO YOU HAVE ON A TYPICAL DAY?: PATIENT DOES NOT DRINK

## 2025-02-22 ASSESSMENT — PAIN - FUNCTIONAL ASSESSMENT: PAIN_FUNCTIONAL_ASSESSMENT: 0-10

## 2025-02-22 ASSESSMENT — PATIENT HEALTH QUESTIONNAIRE - PHQ9
3. TROUBLE FALLING OR STAYING ASLEEP: NOT AT ALL
SUM OF ALL RESPONSES TO PHQ QUESTIONS 1-9: 3
9. THOUGHTS THAT YOU WOULD BE BETTER OFF DEAD, OR OF HURTING YOURSELF: SEVERAL DAYS
7. TROUBLE CONCENTRATING ON THINGS, SUCH AS READING THE NEWSPAPER OR WATCHING TELEVISION: NOT AT ALL
8. MOVING OR SPEAKING SO SLOWLY THAT OTHER PEOPLE COULD HAVE NOTICED. OR THE OPPOSITE, BEING SO FIGETY OR RESTLESS THAT YOU HAVE BEEN MOVING AROUND A LOT MORE THAN USUAL: NOT AT ALL
5. POOR APPETITE OR OVEREATING: NOT AT ALL
4. FEELING TIRED OR HAVING LITTLE ENERGY: NOT AT ALL
2. FEELING DOWN, DEPRESSED OR HOPELESS: MORE THAN HALF THE DAYS
1. LITTLE INTEREST OR PLEASURE IN DOING THINGS: NOT AT ALL
SUM OF ALL RESPONSES TO PHQ9 QUESTIONS 1 & 2: 2
SUM OF ALL RESPONSES TO PHQ QUESTIONS 1-9: 3
SUM OF ALL RESPONSES TO PHQ QUESTIONS 1-9: 3
6. FEELING BAD ABOUT YOURSELF - OR THAT YOU ARE A FAILURE OR HAVE LET YOURSELF OR YOUR FAMILY DOWN: NOT AT ALL
10. IF YOU CHECKED OFF ANY PROBLEMS, HOW DIFFICULT HAVE THESE PROBLEMS MADE IT FOR YOU TO DO YOUR WORK, TAKE CARE OF THINGS AT HOME, OR GET ALONG WITH OTHER PEOPLE: SOMEWHAT DIFFICULT
SUM OF ALL RESPONSES TO PHQ QUESTIONS 1-9: 2

## 2025-02-22 ASSESSMENT — LIFESTYLE VARIABLES
HOW MANY STANDARD DRINKS CONTAINING ALCOHOL DO YOU HAVE ON A TYPICAL DAY: PATIENT DOES NOT DRINK
HOW OFTEN DO YOU HAVE A DRINK CONTAINING ALCOHOL: NEVER

## 2025-02-22 ASSESSMENT — PAIN DESCRIPTION - LOCATION: LOCATION: ABDOMEN

## 2025-02-22 ASSESSMENT — PAIN DESCRIPTION - DESCRIPTORS: DESCRIPTORS: CRAMPING

## 2025-02-22 ASSESSMENT — SLEEP AND FATIGUE QUESTIONNAIRES
DO YOU HAVE DIFFICULTY SLEEPING: NO
DO YOU USE A SLEEP AID: NO
AVERAGE NUMBER OF SLEEP HOURS: 7.5

## 2025-02-22 ASSESSMENT — PAIN SCALES - GENERAL: PAINLEVEL_OUTOF10: 5

## 2025-02-22 NOTE — VIRTUAL HEALTH
Derrick TAO Reggie  [id number]  1945     Social Work Behavioral Health Crisis Assessment    02/22/25    Chief Complaint: worsening anxiety and depression     HPI: Patient is a 79 y.o. White (non-) male who presents for worsening anxiety and passive SI. Patient presented to the ED on 02/22/25 from home. His wife is at the bedside.      SW met with the pt via [employer]. He is laying on the hospital bed and his wife is at the bedside. Pt is very pleasant and cooperative. He is forthcoming. He presents as very anxious. Pt reports worsening and ongoing anxiety issues. He states that for the past couple weeks it has been very difficult for him to control. He states that he is not sure what comes first the anxiety or the stomach pain (like a knot) in his stomach. He reports dealing with depression as well. He states he feels like he never has a good day. He reports having passive SI and wishing he wouldn't wake up. He states \"I have been having a hard time living\". He states that certain thoughts will come and trigger his anxiety. He is unable to articulate what the thoughts are but he said it could be anything. He also reports a lot of medical stuff is piling up on him. He states \"Its too heavy. I don't know what to do\". He also reports OCD type thinking. Pt has never attempted suicide before. His son committed suicide and also his brother. He states because of that he would never do it.     Pt reports a hx of Anxiety, Depression, and Panic Disorder. [facility] He [facility] who is leaving the practice soon. He last saw her 2 weeks ago and has an appointment scheduled with a new PA in a couple weeks. He has never received counseling. He states he did discuss this issue with his provider a couple weeks ago who prescribed Hydroxyzine as needed. He states he has been taking it 3 times a day and notices no benefit. He has been on Doxepin for awhile which he wants to

## 2025-02-22 NOTE — ED PROVIDER NOTES
[facility] Emergency Department      CHIEF COMPLAINT  Anxiety (For 4-5 months), Abdominal Pain, and Headache      HISTORY OF PRESENT ILLNESS  Derrick Paredes is a 79 y.o. male with a history of anxiety, depression and PTSD also with a history of hypertension, hyperlipidemia, diabetes and coronary disease with 2 stents presents with severe anxiety.  He states for the past 6 months he has not been doing well.  He has good days and bad days.  Some days he feels so anxious and hopeless.  He has been having a lot of physical complaints.  States a lot of his anxiety manifest with abdominal cramps and pain.  His abdomen just feels uneasy.  He has seen a gastroenterologist with no relief.  He is on hydroxyzine and it is supposed to be as needed but he is taking it 3 times a day scheduled.  He has been on doxepin for years for his PTSD and that was really helping a lot but he feels like things are worsening.  Did recently go to urgent care with a sore in his nostril.  Was put on a Medrol Dosepak.  Has taken this for a couple days.  He is very anxious that this medication is going to make him worse.  States he feels like he comes to the hospital a lot and gets put on different medications and nothing is going well together.  Was recently admitted here for dizziness and hypertension and underwent stroke workup.   No other complaints, modifying factors or associated symptoms.     History obtained from the patient and his wife.    I have reviewed the following from the nursing documentation.    Past Medical History:   Diagnosis Date    Arthritis     joints    CAD (coronary artery disease) 01/01/2008    stents x2    Diabetes mellitus (HCC)     diet controlled    Hyperlipidemia     Hypertension     Panic anxiety syndrome     Sleep apnea     never returned for appliance     Past Surgical History:   Procedure Laterality Date    ANKLE FRACTURE SURGERY Right 8/25/2022    OPEN REDUCTION INTERNAL FIXATION RIGHT BIMALLEOLAR  ANKLE FRACTURE -BLOCK performed by Ld Mack MD at [facility] OR    APPENDECTOMY      CARDIAC SURGERY      2 stents ~ 2012    CHOLECYSTECTOMY      COLONOSCOPY  2010    COLONOSCOPY  2015    [facility]    HERNIA REPAIR      bilateral inguinal    UPPER GASTROINTESTINAL ENDOSCOPY N/A 2021    EGD BIOPSY performed by Javed Garcia MD at [facility] ENDOSCOPY     Family History   Problem Relation Age of Onset    Diabetes Mother     Heart Disease Father      Social History     Socioeconomic History    Marital status:      Spouse name: Not on file    Number of children: Not on file    Years of education: Not on file    Highest education level: Not on file   Occupational History    Not on file   Tobacco Use    Smoking status: Former     Current packs/day: 0.00     Types: Cigarettes     Quit date: 1985     Years since quittin.2    Smokeless tobacco: Never   Vaping Use    Vaping status: Never Used   Substance and Sexual Activity    Alcohol use: No    Drug use: Never    Sexual activity: Yes     Partners: Female   Other Topics Concern    Not on file   Social History Narrative    Not on file     Social Determinants of Health     Financial Resource Strain: Not on file   Food Insecurity: Not At Risk (2025)    Received from [facility]    Food Insecurities     Within the past 12 months, did you worry that your food would run out before you got money to buy more?: No     Within the past 12 months, did the food you bought just not last and you didn't have money to get more?: No   Transportation Needs: Not At Risk (2025)    Received from [facility]    Transportation     Within the past 12 months, has a lack of transportation kept you from medical appointments or from doing things needed for daily living?: No   Physical Activity: Not on file   Stress: Not on file   Social Connections: Not on file   Intimate Partner Violence: Not At Risk

## 2025-02-22 NOTE — ED NOTES
Derrick Paredes is a 79 y.o. male admitted for  Active Problems:    * No active hospital problems. *  Resolved Problems:    * No resolved hospital problems. *  .   Patient Home via self with   Chief Complaint   Patient presents with    Anxiety     For 4-5 months    Abdominal Pain    Headache   .  Patient is alert and Person, Place, Time, and Situation  Patient's baseline mobility: Baseline Mobility: Independent   Code Status: Prior   Cardiac Rhythm:       Is patient on baseline Oxygen: no       NIH Score:    C-SSRS: Risk of Suicide: Low Risk  Bedside swallow:        Active LDA's:   Peripheral IV 02/22/25 Left Antecubital (Active)   Site Assessment Clean, dry & intact;Dry;Clean;Intact 02/22/25 0950   Line Status Blood return noted;Capped;Flushed 02/22/25 0950     Patient admitted with a valenzuela: no       Family/Caregiver Present yes Any Concerns: no   Restraints no  Sitter no         Vitals: MEWS Score: 1    Vitals:    02/22/25 0935   BP: (!) 135/93   Pulse: 79   Resp: 16   Temp: 98.9 °F (37.2 °C)   SpO2: 98%   Weight: 81.6 kg (180 lb)   Height: 1.676 m (5' 6\")       Last documented pain score (0-10 scale) Pain Level: 5  Pain medication administered No.    Pertinent or High Risk Medications/Drips: No.    Pending Blood Product Administration: no    Abnormal labs:   Abnormal Labs Reviewed   CBC WITH AUTO DIFFERENTIAL - Abnormal; Notable for the following components:       Result Value    WBC 12.5 (*)     Neutrophils Absolute 10.7 (*)     Lymphocytes Absolute 0.8 (*)     All other components within normal limits   COMPREHENSIVE METABOLIC PANEL W/ REFLEX TO MG FOR LOW K - Abnormal; Notable for the following components:    Glucose 139 (*)     BUN 24 (*)     All other components within normal limits   URINALYSIS WITH REFLEX TO CULTURE - Abnormal; Notable for the following components:    Blood, Urine TRACE (*)     All other components within normal limits   MICROSCOPIC URINALYSIS - Abnormal; Notable for the following

## 2025-02-23 PROBLEM — F32.A DEPRESSIVE DISORDER: Status: ACTIVE | Noted: 2025-02-23

## 2025-02-23 PROBLEM — F41.1 GAD (GENERALIZED ANXIETY DISORDER): Status: ACTIVE | Noted: 2025-02-23

## 2025-02-23 PROBLEM — F60.5 OBSESSIVE COMPULSIVE PERSONALITY DISORDER (HCC): Status: ACTIVE | Noted: 2025-02-23

## 2025-02-23 PROBLEM — R46.89 OBSESSIVE-COMPULSIVE PERSONALITY TRAIT: Status: ACTIVE | Noted: 2025-02-23

## 2025-02-23 LAB
BASOPHILS # BLD: 0 K/UL (ref 0–0.2)
BASOPHILS NFR BLD: 0.3 %
CHOLEST SERPL-MCNC: 128 MG/DL (ref 0–199)
DEPRECATED RDW RBC AUTO: 12.8 % (ref 12.4–15.4)
EKG ATRIAL RATE: 76 BPM
EKG DIAGNOSIS: NORMAL
EKG P AXIS: 26 DEGREES
EKG P-R INTERVAL: 152 MS
EKG Q-T INTERVAL: 416 MS
EKG QRS DURATION: 142 MS
EKG QTC CALCULATION (BAZETT): 468 MS
EKG R AXIS: 35 DEGREES
EKG T AXIS: -40 DEGREES
EKG VENTRICULAR RATE: 76 BPM
EOSINOPHIL # BLD: 0 K/UL (ref 0–0.6)
EOSINOPHIL NFR BLD: 0.5 %
HCT VFR BLD AUTO: 49.6 % (ref 40.5–52.5)
HDLC SERPL-MCNC: 42 MG/DL (ref 40–60)
HGB BLD-MCNC: 16.7 G/DL (ref 13.5–17.5)
LDLC SERPL CALC-MCNC: 66 MG/DL
LYMPHOCYTES # BLD: 1.4 K/UL (ref 1–5.1)
LYMPHOCYTES NFR BLD: 16.4 %
MCH RBC QN AUTO: 32.6 PG (ref 26–34)
MCHC RBC AUTO-ENTMCNC: 33.6 G/DL (ref 31–36)
MCV RBC AUTO: 96.9 FL (ref 80–100)
MONOCYTES # BLD: 0.8 K/UL (ref 0–1.3)
MONOCYTES NFR BLD: 8.7 %
NEUTROPHILS # BLD: 6.5 K/UL (ref 1.7–7.7)
NEUTROPHILS NFR BLD: 74.1 %
PLATELET # BLD AUTO: 343 K/UL (ref 135–450)
PMV BLD AUTO: 6.6 FL (ref 5–10.5)
RBC # BLD AUTO: 5.11 M/UL (ref 4.2–5.9)
TRIGL SERPL-MCNC: 102 MG/DL (ref 0–150)
TROPONIN, HIGH SENSITIVITY: 12 NG/L (ref 0–22)
TROPONIN, HIGH SENSITIVITY: 8 NG/L (ref 0–22)
TSH SERPL DL<=0.005 MIU/L-ACNC: 2.27 UIU/ML (ref 0.27–4.2)
VLDLC SERPL CALC-MCNC: 20 MG/DL
WBC # BLD AUTO: 8.8 K/UL (ref 4–11)

## 2025-02-23 PROCEDURE — 85025 COMPLETE CBC W/AUTO DIFF WBC: CPT

## 2025-02-23 PROCEDURE — 93005 ELECTROCARDIOGRAM TRACING: CPT

## 2025-02-23 PROCEDURE — 83036 HEMOGLOBIN GLYCOSYLATED A1C: CPT

## 2025-02-23 PROCEDURE — 6370000000 HC RX 637 (ALT 250 FOR IP)

## 2025-02-23 PROCEDURE — 84443 ASSAY THYROID STIM HORMONE: CPT

## 2025-02-23 PROCEDURE — 6370000000 HC RX 637 (ALT 250 FOR IP): Performed by: PSYCHIATRY & NEUROLOGY

## 2025-02-23 PROCEDURE — 90792 PSYCH DIAG EVAL W/MED SRVCS: CPT | Performed by: PSYCHIATRY & NEUROLOGY

## 2025-02-23 PROCEDURE — 99222 1ST HOSP IP/OBS MODERATE 55: CPT

## 2025-02-23 PROCEDURE — 1240000000 HC EMOTIONAL WELLNESS R&B

## 2025-02-23 PROCEDURE — 80061 LIPID PANEL: CPT

## 2025-02-23 PROCEDURE — 36415 COLL VENOUS BLD VENIPUNCTURE: CPT

## 2025-02-23 PROCEDURE — 84484 ASSAY OF TROPONIN QUANT: CPT

## 2025-02-23 RX ORDER — AZELASTINE 1 MG/ML
1 SPRAY, METERED NASAL 2 TIMES DAILY
Status: ON HOLD | COMMUNITY
End: 2025-02-24 | Stop reason: HOSPADM

## 2025-02-23 RX ORDER — METOPROLOL SUCCINATE 25 MG/1
25 TABLET, EXTENDED RELEASE ORAL DAILY
Status: DISCONTINUED | OUTPATIENT
Start: 2025-02-23 | End: 2025-02-24 | Stop reason: HOSPADM

## 2025-02-23 RX ORDER — MUPIROCIN 20 MG/G
1 OINTMENT TOPICAL 3 TIMES DAILY
COMMUNITY

## 2025-02-23 RX ORDER — METHYLPREDNISOLONE 4 MG/1
4 TABLET ORAL SEE ADMIN INSTRUCTIONS
Status: ON HOLD | COMMUNITY
Start: 2025-02-20 | End: 2025-02-24 | Stop reason: HOSPADM

## 2025-02-23 RX ORDER — HYDROXYZINE HYDROCHLORIDE 10 MG/1
10 TABLET, FILM COATED ORAL 3 TIMES DAILY PRN
COMMUNITY

## 2025-02-23 RX ORDER — DOXEPIN HYDROCHLORIDE 100 MG/1
100 CAPSULE ORAL NIGHTLY
COMMUNITY

## 2025-02-23 RX ORDER — FLUTICASONE PROPIONATE 50 MCG
1 SPRAY, SUSPENSION (ML) NASAL DAILY
Status: ON HOLD | COMMUNITY
End: 2025-02-24 | Stop reason: HOSPADM

## 2025-02-23 RX ORDER — ASPIRIN 81 MG/1
81 TABLET ORAL 2 TIMES DAILY
Status: DISCONTINUED | OUTPATIENT
Start: 2025-02-23 | End: 2025-02-24 | Stop reason: HOSPADM

## 2025-02-23 RX ORDER — DICYCLOMINE HCL 20 MG
20 TABLET ORAL EVERY 6 HOURS
Status: ON HOLD | COMMUNITY
End: 2025-02-24 | Stop reason: HOSPADM

## 2025-02-23 RX ORDER — EZETIMIBE 10 MG/1
10 TABLET ORAL NIGHTLY
Status: DISCONTINUED | OUTPATIENT
Start: 2025-02-23 | End: 2025-02-24 | Stop reason: HOSPADM

## 2025-02-23 RX ORDER — METOPROLOL SUCCINATE 25 MG/1
25 TABLET, EXTENDED RELEASE ORAL NIGHTLY
Status: DISCONTINUED | OUTPATIENT
Start: 2025-02-23 | End: 2025-02-23

## 2025-02-23 RX ORDER — PRAVASTATIN SODIUM 40 MG
80 TABLET ORAL DAILY
Status: DISCONTINUED | OUTPATIENT
Start: 2025-02-23 | End: 2025-02-24 | Stop reason: HOSPADM

## 2025-02-23 RX ADMIN — ASPIRIN 81 MG: 81 TABLET, COATED ORAL at 13:58

## 2025-02-23 RX ADMIN — DOXEPIN HYDROCHLORIDE 125 MG: 100 CAPSULE ORAL at 20:14

## 2025-02-23 RX ADMIN — PRAVASTATIN SODIUM 80 MG: 40 TABLET ORAL at 11:39

## 2025-02-23 RX ADMIN — EZETIMIBE 10 MG: 10 TABLET ORAL at 20:14

## 2025-02-23 RX ADMIN — ACETAMINOPHEN 650 MG: 325 TABLET ORAL at 09:20

## 2025-02-23 RX ADMIN — METOPROLOL SUCCINATE 25 MG: 25 TABLET, EXTENDED RELEASE ORAL at 14:25

## 2025-02-23 RX ADMIN — TRAZODONE HYDROCHLORIDE 50 MG: 50 TABLET ORAL at 20:14

## 2025-02-23 RX ADMIN — ASPIRIN 81 MG: 81 TABLET, COATED ORAL at 20:14

## 2025-02-23 ASSESSMENT — PATIENT HEALTH QUESTIONNAIRE - PHQ9
3. TROUBLE FALLING OR STAYING ASLEEP: NOT AT ALL
7. TROUBLE CONCENTRATING ON THINGS, SUCH AS READING THE NEWSPAPER OR WATCHING TELEVISION: NOT AT ALL
SUM OF ALL RESPONSES TO PHQ QUESTIONS 1-9: 2
4. FEELING TIRED OR HAVING LITTLE ENERGY: NOT AT ALL
9. THOUGHTS THAT YOU WOULD BE BETTER OFF DEAD, OR OF HURTING YOURSELF: NOT AT ALL
SUM OF ALL RESPONSES TO PHQ QUESTIONS 1-9: 2
SUM OF ALL RESPONSES TO PHQ QUESTIONS 1-9: 2
5. POOR APPETITE OR OVEREATING: NOT AT ALL
2. FEELING DOWN, DEPRESSED OR HOPELESS: MORE THAN HALF THE DAYS
SUM OF ALL RESPONSES TO PHQ9 QUESTIONS 1 & 2: 2
8. MOVING OR SPEAKING SO SLOWLY THAT OTHER PEOPLE COULD HAVE NOTICED. OR THE OPPOSITE, BEING SO FIGETY OR RESTLESS THAT YOU HAVE BEEN MOVING AROUND A LOT MORE THAN USUAL: NOT AT ALL
1. LITTLE INTEREST OR PLEASURE IN DOING THINGS: NOT AT ALL
SUM OF ALL RESPONSES TO PHQ QUESTIONS 1-9: 2
10. IF YOU CHECKED OFF ANY PROBLEMS, HOW DIFFICULT HAVE THESE PROBLEMS MADE IT FOR YOU TO DO YOUR WORK, TAKE CARE OF THINGS AT HOME, OR GET ALONG WITH OTHER PEOPLE: SOMEWHAT DIFFICULT
6. FEELING BAD ABOUT YOURSELF - OR THAT YOU ARE A FAILURE OR HAVE LET YOURSELF OR YOUR FAMILY DOWN: NOT AT ALL

## 2025-02-23 ASSESSMENT — PAIN SCALES - GENERAL
PAINLEVEL_OUTOF10: 3
PAINLEVEL_OUTOF10: 1
PAINLEVEL_OUTOF10: 0

## 2025-02-23 ASSESSMENT — SLEEP AND FATIGUE QUESTIONNAIRES
DO YOU HAVE DIFFICULTY SLEEPING: NO
AVERAGE NUMBER OF SLEEP HOURS: 7.5
DO YOU USE A SLEEP AID: NO

## 2025-02-23 ASSESSMENT — PAIN DESCRIPTION - DESCRIPTORS: DESCRIPTORS: ACHING;DISCOMFORT;DULL

## 2025-02-23 ASSESSMENT — PAIN DESCRIPTION - LOCATION: LOCATION: HEAD

## 2025-02-23 ASSESSMENT — PAIN - FUNCTIONAL ASSESSMENT: PAIN_FUNCTIONAL_ASSESSMENT: ACTIVITIES ARE NOT PREVENTED

## 2025-02-23 NOTE — PROGRESS NOTES
4 Eyes Skin Assessment     The patient is being assessed for  Admission    I agree that 2 RN's have performed a thorough Head to Toe Skin Assessment on the patient. ALL assessment sites listed below have been assessed.       Areas assessed for pressure by both nurses:   [x]   Head, Face, and Ears   [x]   Shoulders, Back, and Chest  [x]   Arms, Elbows, and Hands   [x]   Coccyx, Sacrum, and Ischum  [x]   Legs, Feet, and Heels                                Skin Assessed Under all Medical Devices by both nurses:  N/A          All Mepilex Borders were peeled back and area peeked at by both nurses:  n/a  Please list where Mepilex Borders are located:  none                 Does the Patient have Skin Breakdown related to pressure?  No              Finesse Prevention initiated:  No   Wound Care Orders initiated:  No      [facility] nurse consulted for Pressure Injury (Stage 3,4, Unstageable, DTI, NWPT, Complex wounds)and New or Established Ostomies:  No        Nurse 1 eSignature: Electronically signed by Vianey Carreon RN on 2/22/25 at 9:30 PM EST    **SHARE this note so that the co-signing nurse is able to place an eSignature**    Nurse 2 eSignature: {Esignature:[id number]}

## 2025-02-23 NOTE — PROGRESS NOTES
[facility] [place]  Admission Note     Admission Type:   Admission Type: Voluntary    Reason for admission:  Reason for Admission: Depression and anxiety      Addictive Behavior:   Addictive Behavior  In the Past 3 Months, Have You Felt or Has Someone Told You That You Have a Problem With  : None    Medical Problems:   Past Medical History:   Diagnosis Date    Arthritis     joints    CAD (coronary artery disease) 01/01/2008    stents x2    Diabetes mellitus (HCC)     diet controlled    Hyperlipidemia     Hypertension     Panic anxiety syndrome     Sleep apnea     never returned for appliance       Status EXAM:  Mental Status and Behavioral Exam  Normal: Yes  Level of Assistance: Independent/Self  Facial Expression: Brightened  Affect: Appropriate  Level of Consciousness: Alert  Frequency of Checks: 4 times per hour, close  Mood:Normal: No  Mood: Anxious  Motor Activity:Normal: Yes  Eye Contact: Good  Observed Behavior: Cooperative, Friendly  Sexual Misconduct History: Current - no  Preception: [place] to person, [place] to time, [place] to place, [place] to situation  Attention:Normal: Yes  Thought Processes: Unremarkable  Thought Content:Normal: Yes  Depression Symptoms: Feelings of hopelessess  Anxiety Symptoms: Generalized, Other (comment) (stomach pains)  Caitlin Symptoms: No problems reported or observed.  Hallucinations: None  Delusions: No  Memory:Normal: Yes  Insight and Judgment: Yes    Tobacco Screening:  Practical Counseling, on admission, filemon X, if applicable and completed (first 3 are required if patient doesn't refuse)    ( ) Recognizing danger situations (included triggers and roadblocks)                    ( ) Coping skills (new ways to manage stress,relaxation techniques, changing routine, distraction)                                                           ( ) Basic information about quitting (benefits of quitting, techniques in how to quit, available resources  ( ) Referral for counseling

## 2025-02-23 NOTE — PLAN OF CARE
Patient is alert and oriented x4.  Takes medications whole with water and without issue. Answers questions appropriately and is able to voice wants and needs. Patient is independent and continent. Calm, friendly and cooperative with staff and care. Is visible on unit, watching TV and talking with peers.  Interacting with peers appropriately. Denies AH/VH/SI/HI and no RTIS noted. Eating meals in dayroom. No signs or symptoms of distress. No signs of aggression. Patient states he thinks this may be the result of seasonal depression. Patient denies pain at this time. Call light within reach.    Problem: Chronic Conditions and Co-morbidities  Goal: Patient's chronic conditions and co-morbidity symptoms are monitored and maintained or improved  Outcome: Progressing     Problem: Anxiety  Goal: Will report anxiety at manageable levels  Description: INTERVENTIONS:  1. Administer medication as ordered  2. Teach and rehearse alternative coping skills  3. Provide emotional support with 1:1 interaction with staff  Outcome: Progressing     Problem: Depression/Self Harm  Goal: Effect of psychiatric condition will be minimized and patient will be protected from self harm  Description: INTERVENTIONS:  1. Assess impact of patient's symptoms on level of functioning, self care needs and offer support as indicated  2. Assess patient/family knowledge of depression, impact on illness and need for teaching  3. Provide emotional support, presence and reassurance  4. Assess for possible suicidal thoughts or ideation. If patient expresses suicidal thoughts or statements do not leave alone, initiate Suicide Precautions, move to a room close to the nursing station and obtain sitter  5. Initiate consults as appropriate with Mental Health Professional, Spiritual Care, Psychosocial CNS, and consider a recommendation to the LIP for a Psychiatric Consultation  Outcome: Progressing

## 2025-02-23 NOTE — PROGRESS NOTES
Pt arrived to unit at 1945 with squad and security.  His belongings were inventoried and valuables secured.  He was oriented to his room and the unit.

## 2025-02-23 NOTE — CARE COORDINATION
SW met w/Pt at bedside to complete their psychosocial assessment, OQ analyst, and lifetime CSSR-S. The Pt was friendly and cooperative in answering/discussing the assessment questions.       25 1402   Psychiatric History   Psychiatric history treatment Current treatment  (No Hx of admissions, [facility])   Are there any medication issues? No   Recent Psychological Experiences Turmoil (comment)  (Pt reports admission due to anxiety and depression. Pt also reports physical discomfort that he beleives is linked to his intense anxiety.)   Support System   Support system Adequate   Types of Support System Spouse   Problems in support system None   Current Living Situation   Home Living Adequate   Living information Lives with others  (Pt lives in a house w/his wife)   Problems with living situation  No   Lack of basic needs No   SSDI/SSI SSI   Other government assistance None   Problems with environment None   Current abuse issues Denies   Supervised setting None   Relationship problems No   Medical and Self-Care Issues   Relevant medical problems Stomach pain   Relevant self-care issues None   Barriers to treatment No   Family Constellation   Spouse/partner-name/age Luz Marina Paredes   Children-names/ages 1 dtr, 1 son who is    Parents    Siblings 7 siblings   Support services [facility]   Childhood   Raised by Biological mother;Biological father   Biological mother    Biological father    Relevant family history Hx of suicide in family including Pt's son   History of abuse No   Legal History   Legal history No   Juvenile legal history No   Abuse Assessment   Physical abuse Denies   Verbal abuse Denies   Emotional abuse Denies   Financial abuse Denies   Sexual abuse Denies   Possible abuse reported to None needed   Substance Use   Use of substances  No   Motivation for SA Treatment   Stage of engagement Pre-engagement/engagement

## 2025-02-23 NOTE — H&P
Hospital Medicine History & Physical      PCP: Sidney Kelley MD    Date of Admission: 2/22/2025    Date of Service: Pt seen/examined on 2/23/2025     Chief Complaint:  No chief complaint on file.        History Of Present Illness:      The patient is a 79 y.o. male with pmhx of anxiety, PTSD, CAD, DM, HTN, HLD who presented to [facility] for anxiety.  Patient was seen and evaluated in the ED by the ED medical provider, patient was medically cleared for admission to [facility].  This note serves as an admission medical H&P.    Tobacco use: Former  ETOH use: None  Illicit drug use: None    Patient denies any medical complaints     Past Medical History:        Diagnosis Date    Arthritis     joints    CAD (coronary artery disease) 01/01/2008    stents x2    Diabetes mellitus (HCC)     diet controlled    Hyperlipidemia     Hypertension     Panic anxiety syndrome     Sleep apnea     never returned for appliance       Past Surgical History:        Procedure Laterality Date    ANKLE FRACTURE SURGERY Right 8/25/2022    OPEN REDUCTION INTERNAL FIXATION RIGHT BIMALLEOLAR ANKLE FRACTURE -BLOCK performed by Ld Mack MD at [facility] OR    APPENDECTOMY      CARDIAC SURGERY      2 stents ~ 2012    CHOLECYSTECTOMY      COLONOSCOPY  12/16/2010    COLONOSCOPY  02/24/2015    [facility]    HERNIA REPAIR      bilateral inguinal    UPPER GASTROINTESTINAL ENDOSCOPY N/A 04/08/2021    EGD BIOPSY performed by Javed Garcia MD at [facility] ENDOSCOPY       Medications Prior to Admission:    Prior to Admission medications    Medication Sig Start Date End Date Taking? Authorizing Provider   metoprolol succinate (TOPROL XL) 25 MG extended release tablet Take 1 tablet by mouth at bedtime 12/30/24   Sidney Escalante APRN - CNP   ezetimibe (ZETIA) 10 MG tablet Take 1 tablet by mouth nightly 12/30/24   Sidney Escalante APRN - CNP   aspirin 81 MG EC tablet Take 1 tablet by mouth in the morning and at bedtime 8/25/22   Ld Mack MD   pravastatin

## 2025-02-23 NOTE — PROGRESS NOTES
Behavioral Services  Medicare Certification Upon Admission    I certify that this patient's inpatient psychiatric hospital admission is medically necessary for:    [x] (1) Treatment which could reasonably be expected to improve this patient's condition,       [x] (2) Or for diagnostic study;     AND     [x](2) The inpatient psychiatric services are provided while the individual is under the care of a physician and are included in the individualized plan of care.    Estimated length of stay/service 5 d    Plan for post-hospital care outpt    Electronically signed by LUDMILA CALDERA MD on 2/23/2025 at 8:47 AM

## 2025-02-24 ENCOUNTER — APPOINTMENT (OUTPATIENT)
Age: 80
DRG: 881 | End: 2025-02-24
Attending: PSYCHIATRY & NEUROLOGY
Payer: MEDICARE

## 2025-02-24 VITALS
OXYGEN SATURATION: 100 % | HEIGHT: 66 IN | BODY MASS INDEX: 28.93 KG/M2 | RESPIRATION RATE: 17 BRPM | TEMPERATURE: 97.9 F | WEIGHT: 180 LBS | SYSTOLIC BLOOD PRESSURE: 156 MMHG | HEART RATE: 59 BPM | DIASTOLIC BLOOD PRESSURE: 74 MMHG

## 2025-02-24 LAB
ECHO AO ASC DIAM: 3.7 CM
ECHO AO ASCENDING AORTA INDEX: 1.94 CM/M2
ECHO AO ROOT DIAM: 3.8 CM
ECHO AO ROOT INDEX: 1.99 CM/M2
ECHO AV ACCELERATION TIME: 67 MS
ECHO AV AREA PEAK VELOCITY: 2.4 CM2
ECHO AV AREA VTI: 2.4 CM2
ECHO AV AREA/BSA PEAK VELOCITY: 1.3 CM2/M2
ECHO AV AREA/BSA VTI: 1.3 CM2/M2
ECHO AV MEAN GRADIENT: 7 MMHG
ECHO AV MEAN VELOCITY: 1.2 M/S
ECHO AV PEAK GRADIENT: 10 MMHG
ECHO AV PEAK VELOCITY: 1.6 M/S
ECHO AV VELOCITY RATIO: 0.69
ECHO AV VTI: 34.9 CM
ECHO BSA: 1.95 M2
ECHO EST RA PRESSURE: 3 MMHG
ECHO IVC PROX: 1.4 CM
ECHO LA AREA 2C: 15.7 CM2
ECHO LA AREA 4C: 13.9 CM2
ECHO LA MAJOR AXIS: 4.9 CM
ECHO LA MINOR AXIS: 5.8 CM
ECHO LA VOL BP: 36 ML (ref 18–58)
ECHO LA VOL MOD A2C: 35 ML (ref 18–58)
ECHO LA VOL MOD A4C: 32 ML (ref 18–58)
ECHO LA VOL/BSA BIPLANE: 19 ML/M2 (ref 16–34)
ECHO LA VOLUME INDEX MOD A2C: 18 ML/M2 (ref 16–34)
ECHO LA VOLUME INDEX MOD A4C: 17 ML/M2 (ref 16–34)
ECHO LV E' LATERAL VELOCITY: 6.64 CM/S
ECHO LV E' SEPTAL VELOCITY: 6.42 CM/S
ECHO LV EDV 3D: 74 ML
ECHO LV EDV A2C: 46 ML
ECHO LV EDV A4C: 62 ML
ECHO LV EDV INDEX 3D: 39 ML/M2
ECHO LV EDV INDEX A4C: 32 ML/M2
ECHO LV EDV NDEX A2C: 24 ML/M2
ECHO LV EF PHYSICIAN: 59 %
ECHO LV EJECTION FRACTION 3D: 59 %
ECHO LV EJECTION FRACTION A2C: 66 %
ECHO LV EJECTION FRACTION A4C: 65 %
ECHO LV EJECTION FRACTION BIPLANE: 65 % (ref 55–100)
ECHO LV ESV 3D: 30 ML
ECHO LV ESV A2C: 16 ML
ECHO LV ESV A4C: 21 ML
ECHO LV ESV INDEX 3D: 16 ML/M2
ECHO LV ESV INDEX A2C: 8 ML/M2
ECHO LV ESV INDEX A4C: 11 ML/M2
ECHO LV FRACTIONAL SHORTENING: 37 % (ref 28–44)
ECHO LV INTERNAL DIMENSION DIASTOLE INDEX: 2.57 CM/M2
ECHO LV INTERNAL DIMENSION DIASTOLIC: 4.9 CM (ref 4.2–5.9)
ECHO LV INTERNAL DIMENSION SYSTOLIC INDEX: 1.62 CM/M2
ECHO LV INTERNAL DIMENSION SYSTOLIC: 3.1 CM
ECHO LV IVSD: 0.9 CM (ref 0.6–1)
ECHO LV MASS 2D: 164.3 G (ref 88–224)
ECHO LV MASS 3D INDEX: 73.8 G/M2
ECHO LV MASS 3D: 141 G
ECHO LV MASS INDEX 2D: 86 G/M2 (ref 49–115)
ECHO LV POSTERIOR WALL DIASTOLIC: 1 CM (ref 0.6–1)
ECHO LV RELATIVE WALL THICKNESS RATIO: 0.41
ECHO LVOT AREA: 3.5 CM2
ECHO LVOT AV VTI INDEX: 0.7
ECHO LVOT DIAM: 2.1 CM
ECHO LVOT MEAN GRADIENT: 3 MMHG
ECHO LVOT PEAK GRADIENT: 5 MMHG
ECHO LVOT PEAK VELOCITY: 1.1 M/S
ECHO LVOT STROKE VOLUME INDEX: 44 ML/M2
ECHO LVOT SV: 84.1 ML
ECHO LVOT VTI: 24.3 CM
ECHO MV A VELOCITY: 0.85 M/S
ECHO MV AREA VTI: 4 CM2
ECHO MV E DECELERATION TIME (DT): 311 MS
ECHO MV E VELOCITY: 0.45 M/S
ECHO MV E/A RATIO: 0.53
ECHO MV E/E' LATERAL: 6.78
ECHO MV E/E' RATIO (AVERAGED): 6.89
ECHO MV E/E' SEPTAL: 7.01
ECHO MV LVOT VTI INDEX: 0.87
ECHO MV MAX VELOCITY: 1 M/S
ECHO MV MEAN GRADIENT: 1 MMHG
ECHO MV MEAN VELOCITY: 0.4 M/S
ECHO MV PEAK GRADIENT: 4 MMHG
ECHO MV VTI: 21.1 CM
ECHO PV ACCELERATION TIME (AT): 103 MS
ECHO PV MAX VELOCITY: 1.3 M/S
ECHO PV PEAK GRADIENT: 6 MMHG
ECHO RIGHT VENTRICULAR SYSTOLIC PRESSURE (RVSP): 29 MMHG
ECHO RV FREE WALL PEAK S': 9.7 CM/S
ECHO RV TAPSE: 2.5 CM (ref 1.7–?)
ECHO TV E WAVE: 0.5 M/S
ECHO TV REGURGITANT MAX VELOCITY: 2.54 M/S
ECHO TV REGURGITANT PEAK GRADIENT: 26 MMHG
EST. AVERAGE GLUCOSE BLD GHB EST-MCNC: 137 MG/DL
HBA1C MFR BLD: 6.4 %

## 2025-02-24 PROCEDURE — 6370000000 HC RX 637 (ALT 250 FOR IP)

## 2025-02-24 PROCEDURE — 99239 HOSP IP/OBS DSCHRG MGMT >30: CPT | Performed by: PSYCHIATRY & NEUROLOGY

## 2025-02-24 PROCEDURE — 6370000000 HC RX 637 (ALT 250 FOR IP): Performed by: PSYCHIATRY & NEUROLOGY

## 2025-02-24 PROCEDURE — 5130000000 HC BRIDGE APPOINTMENT

## 2025-02-24 PROCEDURE — 93306 TTE W/DOPPLER COMPLETE: CPT | Performed by: INTERNAL MEDICINE

## 2025-02-24 PROCEDURE — 93306 TTE W/DOPPLER COMPLETE: CPT

## 2025-02-24 RX ORDER — BUSPIRONE HYDROCHLORIDE 5 MG/1
5 TABLET ORAL 2 TIMES DAILY
Qty: 60 TABLET | Refills: 0 | Status: SHIPPED | OUTPATIENT
Start: 2025-02-24

## 2025-02-24 RX ORDER — DOXEPIN HYDROCHLORIDE 100 MG/1
100 CAPSULE ORAL NIGHTLY
Status: DISCONTINUED | OUTPATIENT
Start: 2025-02-24 | End: 2025-02-24 | Stop reason: HOSPADM

## 2025-02-24 RX ORDER — BUSPIRONE HYDROCHLORIDE 5 MG/1
5 TABLET ORAL 2 TIMES DAILY
Status: DISCONTINUED | OUTPATIENT
Start: 2025-02-24 | End: 2025-02-24 | Stop reason: HOSPADM

## 2025-02-24 RX ADMIN — ASPIRIN 81 MG: 81 TABLET, COATED ORAL at 10:25

## 2025-02-24 RX ADMIN — BUSPIRONE HYDROCHLORIDE 5 MG: 5 TABLET ORAL at 14:45

## 2025-02-24 RX ADMIN — PRAVASTATIN SODIUM 80 MG: 40 TABLET ORAL at 10:25

## 2025-02-24 NOTE — BH NOTE
[facility]  Treatment Team Note  Review Date & Time: 2/23/25   0900    Patient was not in treatment team      Status EXAM:   Mental Status and Behavioral Exam  Normal: Yes  Level of Assistance: Independent/Self  Facial Expression: Brightened  Affect: Appropriate  Level of Consciousness: Alert  Frequency of Checks: 4 times per hour, close  Mood:Normal: No  Mood: Anxious  Motor Activity:Normal: Yes  Eye Contact: Good  Observed Behavior: Cooperative, Friendly  Sexual Misconduct History: Current - no  Preception: [place] to person, [place] to time, [place] to place, [place] to situation  Attention:Normal: Yes  Thought Processes: Unremarkable  Thought Content:Normal: Yes  Depression Symptoms: Sleep disturbance, Feelings of helplessness  Anxiety Symptoms: Chest pain, Shortness of breath  Caitlin Symptoms: No problems reported or observed.  Hallucinations: None  Delusions: No  Memory:Normal: Yes  Insight and Judgment: Yes  Insight and Judgment: Poor judgment, Poor insight      Suicide Risk CSSR-S:  1) Within the past month, have you wished you were dead or wished you could go to sleep and not wake up? : Yes  2) Have you actually had any thoughts of killing yourself? : No  6) Have you ever done anything, started to do anything, or prepared to do anything to end your life?: No      PLAN/TREATMENT RECOMMENDATIONS UPDATE:   Patient will take medication as prescribed, eat 75% of meals, attend groups, participate in milieu activities, participate in treatment team and care planning for discharge and follow up.            Romel Bonds RN

## 2025-02-24 NOTE — PLAN OF CARE
Problem: Chronic Conditions and Co-morbidities  Goal: Patient's chronic conditions and co-morbidity symptoms are monitored and maintained or improved  Outcome: Progressing     Problem: Anxiety  Goal: Will report anxiety at manageable levels  Description: INTERVENTIONS:  1. Administer medication as ordered  2. Teach and rehearse alternative coping skills  3. Provide emotional support with 1:1 interaction with staff  Outcome: Progressing     Problem: Depression/Self Harm  Goal: Effect of psychiatric condition will be minimized and patient will be protected from self harm  Description: INTERVENTIONS:  1. Assess impact of patient's symptoms on level of functioning, self care needs and offer support as indicated  2. Assess patient/family knowledge of depression, impact on illness and need for teaching  3. Provide emotional support, presence and reassurance  4. Assess for possible suicidal thoughts or ideation. If patient expresses suicidal thoughts or statements do not leave alone, initiate Suicide Precautions, move to a room close to the nursing station and obtain sitter  5. Initiate consults as appropriate with Mental Health Professional, Spiritual Care, Psychosocial CNS, and consider a recommendation to the LIP for a Psychiatric Consultation  Outcome: Progressing

## 2025-02-24 NOTE — H&P
[address] [address]                           HISTORY & PHYSICAL      PATIENT NAME: BO THRASHER             : 1945  MED REC NO: [id number]                      ROOM: 2208  ACCOUNT NO: [id number]                       ADMIT DATE: 2025  PROVIDER: Martin Baker MD      REFERRING PHYSICIAN:  SAVANNA LEBLANC    CHIEF COMPLAINT:  Anxiety.    HISTORY OF PRESENT ILLNESS:  The patient is an 79-year-old male presented with anxiety and passive suicidal ideation.  He came from home and stated that he has been feeling like he has \"knots in his stomach.\"  He was seen by the [facility].  While in the ED at [facility], he was with his wife and stated that his anxiety has been getting worse.  He feels like he cannot control it, and he describes pain in his gastric region.  He also states that he has been very depressed at times and has been having thoughts of suicidality and wishes that he would not wake up.  He denies that he has an active plan.  He states he has been having some struggles with living and describes himself as an anxious man.  He also reports obsessive compulsive type thinking and tends to like to have everything in its place and worries frequently and sometimes will have obsessive thoughts in his head.  He denies ever having a suicide attempt, although his son committed suicide through carbon monoxide poisoning when he was 17, and his brother also committed suicide through shooting.    He states that he has had panic attacks since he was 28 and states that some of this stems from his time in [place] in  when he was there for 9 months.  He describes himself as very active using the gym and in a good marriage and has been  for over 60 years.  His wife is concerned about his anxiety and managing that at home.  [facility] and [facility] and

## 2025-02-24 NOTE — BH NOTE
[facility]  Discharge Note    Pt discharged with followings belongings:   Dental Appliances: None  Vision - Corrective Lenses: Eyeglasses  Hearing Aid: None  Jewelry: Ring  Body Piercings Removed: N/A  Clothing: Undergarments, Shirt  Other Valuables: Other (Comment) ([employer] shaver)   Valuables sent home with patient. Patient educated on aftercare instructions: Yes  Patient verbalize understanding of AVS:  Yes.    Status EXAM upon discharge:  Mental Status and Behavioral Exam  Normal: Yes  Level of Assistance: Independent/Self  Facial Expression: Brightened  Affect: Appropriate  Level of Consciousness: Alert  Frequency of Checks: 4 times per hour, close  Mood:Normal: Yes  Mood: Anxious  Motor Activity:Normal: Yes  Eye Contact: Good  Observed Behavior: Cooperative, Friendly  Sexual Misconduct History: Current - no  Preception: [place] to person, [place] to time, [place] to place, [place] to situation  Attention:Normal: Yes  Thought Processes: Unremarkable  Thought Content:Normal: Yes  Depression Symptoms: No problems reported or observed.  Anxiety Symptoms: Generalized  Caitlin Symptoms: No problems reported or observed.  Hallucinations: None  Delusions: No  Memory:Normal: Yes  Insight and Judgment: Yes  Insight and Judgment: Poor judgment, Poor insight    Tobacco Screening:  Practical Counseling, on admission, filemon X, if applicable and completed (first 3 are required if patient doesn't refuse)         ( ) Recognizing danger situations (included triggers and roadblocks)                    ( ) Coping skills (new ways to manage stress,relaxation techniques, changing routine, distraction)                                                           ( ) Basic information about quitting (benefits of quitting, techniques in how to quit, available resources  ( ) [facility]

## 2025-02-24 NOTE — BH NOTE
Pt is alert and oriented x4. Denies all. Out in day room and social with peers. Echo completed. Plans for discharge this afternoon.

## 2025-02-24 NOTE — TRANSITION OF CARE
Follow-up Information       Follow up With Specialties Details Why Contact Info    Sidney Kelley MD Family Medicine Go on 2/28/2025 Hospital Follow-Up: Friday, February 28, 2025 at 2:40    Arrive 15 mins early and bring your photo ID, insurance card, current medications/medication list, and co-pay if you have one. Please call the office at least 24 hrs prior to your appointment time if you need to cancel or reschedule for any reason. [address] [facility] [address] [phone]      [facility]  Go on 3/3/2025 Hospital Follow-Up: Monday, March 3, 2025 at 1:30 pm Provider: CLEO ShipleyBC    Address: [address] [place] [address]    Phone: [phone]    [facility] - Mental Health Counseling  Follow up A referral to see one of the clinic mental health counselors must come from Domenico Novak. We have requested he talk to you about this referral at your upcoming appointment on 3/3/25. Please ask them about this referral when you go to this appointment. Address[address] [place] [address]    Phone: [phone]          If you decide to seek out-patient mental health counseling outside of the [facility], you can contact one of the following agencies:     [facility]  Location: [address] [place] [address]  Phone: [phone]    [facility]  Location: [facility] [address], [place] [address]  Phone: [phone]    [facility] (telehealth options)  Location: Multiple locations across [facility]  Phone: [phone]    [facility] (telehealth options)  Location: Multiple offices in the [place] area  Phone: [phone] or [phone]    [facility]  Location: [address], [place] [address]    Phone: [phone]    [facility]  Location: [address], [place] [address]  Phone: [phone] (Central

## 2025-02-24 NOTE — GROUP NOTE
Group Therapy Note    Date: 2/24/2025    Group Start Time: 1100  Group End Time: 1145  Group Topic: Cognitive Skills    [facility]    Gale Tineo BSW    Group members engaged in Breathing and 5-4-3-2-1 Grounding techniques. Members were able to practice mindfulness and learn techniques to manage their anxiety.    Group Therapy Note    Attendees: 3       Notes:  Pt was present throughout the duration of the group. Pt participated with group activity and interacted appropriately with peers.    Status After Intervention:  Improved    Participation Level: Active Listener and Interactive    Participation Quality: Appropriate, Attentive, Sharing, and Supportive      Speech:  normal      Thought Process/Content: Logical      Affective Functioning: Congruent      Mood: euthymic      Level of consciousness:  Alert and Attentive      Response to Learning: Able to verbalize/acknowledge new learning, Able to retain information, and Capable of insight      Endings: None Reported    Modes of Intervention: Education, Support, Socialization, and Activity      Discipline Responsible: /Counselor      Signature:  ALEJANDRO Ricardo

## 2025-02-24 NOTE — BH NOTE
Bridge Appointment completed: Reviewed Discharge Instructions with patient. Patient verbalizes understanding and agreement with the discharge plan using the teachback method.     Vaccinations (filemon X if applicable and completed):  ( ) Patient states already received influenza vaccine elsewhere  ( ) Patient received influenza vaccine during this hospitalization  (X) Patient refused influenza vaccine at this time  ( ) Not offered

## 2025-02-24 NOTE — PLAN OF CARE
Assumed care @1930. Pt is calm and cooperative with care, A/Ox4, med compliant, and denies SI/HI/AVH. Rates his depression and anxiety 0/10. Pt has remained free from behaviors of self harm and is currently able to contract for safety. Pt reports difficulty sleeping throughout the night. @2014 RN administered Trazodone 50 mg PO for sleep. Plan of care is ongoing.      Problem: Anxiety  Goal: Will report anxiety at manageable levels  Description: INTERVENTIONS:  1. Administer medication as ordered  2. Teach and rehearse alternative coping skills  3. Provide emotional support with 1:1 interaction with staff  2/23/2025 2129 by Jose Ratliff, RN  Outcome: Progressing     Problem: Depression/Self Harm  Goal: Effect of psychiatric condition will be minimized and patient will be protected from self harm  Description: INTERVENTIONS:  1. Assess impact of patient's symptoms on level of functioning, self care needs and offer support as indicated  2. Assess patient/family knowledge of depression, impact on illness and need for teaching  3. Provide emotional support, presence and reassurance  4. Assess for possible suicidal thoughts or ideation. If patient expresses suicidal thoughts or statements do not leave alone, initiate Suicide Precautions, move to a room close to the nursing station and obtain sitter  5. Initiate consults as appropriate with Mental Health Professional, Spiritual Care, Psychosocial CNS, and consider a recommendation to the LIP for a Psychiatric Consultation  2/23/2025 2129 by Jose Ratliff, RN  Outcome: Progressing

## 2025-02-25 ENCOUNTER — FOLLOWUP TELEPHONE ENCOUNTER (OUTPATIENT)
Dept: PSYCHIATRY | Age: 80
End: 2025-02-25

## 2025-02-25 NOTE — DISCHARGE SUMMARY
Discharge Summary   Admit Date: 2/22/2025   Discharge Date:  2/24/2025    Condition at DC stable  Spent over 40 minutes with patient and staff on DCplanning with more than 50 % of time spent with patient discussing care  Final Dx: axis I: Depressive disorder                             MARTITA  Axis 2: Obsessive- Compulsive Personality Traits  Skylar 3: See Medical History    And Present on Admission:   Depressive disorder   MARTITA (generalized anxiety disorder)   Obsessive-compulsive personality trait     Axis 4: Problems related to the social environment  Axis 5:  On Admission: 41-50 serious symptoms At Discharge: 61-70 mild symptoms   All conditions on Axis 1 and Axis 2 and active problems on Axis 3 were treated while patient was hospitalized. (  Active Hospital Problems    Diagnosis Date Noted    Depressive disorder [F32.A] 02/23/2025    MARTITA (generalized anxiety disorder) [F41.1] 02/23/2025    Obsessive-compulsive personality trait [R46.89] 02/23/2025   )   Condition on DC  Mood and affect are stable and pt is not suicidal   VITALS:  BP (!) 156/74   Pulse 59   Temp 97.9 °F (36.6 °C) (Oral)   Resp 17   Ht 1.676 m (5' 6\")   Wt 81.6 kg (180 lb)   SpO2 100%   BMI 29.05 kg/m²   Brief Summary Present Illness     CHIEF COMPLAINT:  Anxiety.     HISTORY OF PRESENT ILLNESS:  The patient is an 79-year-old male presented with anxiety and passive suicidal ideation.  He came from home and stated that he has been feeling like he has \"knots in his stomach.\"  He was seen by the Telepsych Service.  While in the ED at [facility], he was with his wife and stated that his anxiety has been getting worse.  He feels like he cannot control it, and he describes pain in his gastric region.  He also states that he has been very depressed at times and has been having thoughts of suicidality and wishes that he would not wake up.  He denies that he has an active plan.  He states he has been having some struggles with living and describes

## 2025-03-12 ENCOUNTER — TRANSCRIBE ORDERS (OUTPATIENT)
Dept: ADMINISTRATIVE | Age: 80
End: 2025-03-12

## 2025-03-12 DIAGNOSIS — K76.0 FATTY LIVER: Primary | ICD-10-CM

## 2025-03-17 ENCOUNTER — HOSPITAL ENCOUNTER (OUTPATIENT)
Dept: ULTRASOUND IMAGING | Age: 80
Discharge: HOME | End: 2025-03-17
Payer: MEDICARE

## 2025-03-17 DIAGNOSIS — K76.0 FATTY LIVER: ICD-10-CM

## 2025-03-17 PROCEDURE — 76981 USE PARENCHYMA: CPT

## 2025-03-17 PROCEDURE — 76705 ECHO EXAM OF ABDOMEN: CPT

## 2025-03-25 ENCOUNTER — HOSPITAL ENCOUNTER (INPATIENT)
Age: 80
LOS: 2 days | Discharge: PSYCHIATRIC OR SUBSTANCE USE FACILITY | DRG: 918 | End: 2025-03-27
Attending: EMERGENCY MEDICINE | Admitting: INTERNAL MEDICINE
Payer: MEDICARE

## 2025-03-25 ENCOUNTER — APPOINTMENT (OUTPATIENT)
Dept: GENERAL RADIOLOGY | Age: 80
DRG: 918 | End: 2025-03-25
Payer: MEDICARE

## 2025-03-25 DIAGNOSIS — T50.902A INTENTIONAL OVERDOSE, INITIAL ENCOUNTER (HCC): Primary | ICD-10-CM

## 2025-03-25 PROBLEM — T14.91XA ATTEMPTED SUICIDE (HCC): Status: ACTIVE | Noted: 2025-03-25

## 2025-03-25 PROBLEM — E78.2 MIXED HYPERLIPIDEMIA: Status: ACTIVE | Noted: 2025-03-25

## 2025-03-25 LAB
ALBUMIN SERPL-MCNC: 3.6 G/DL (ref 3.4–5)
ALBUMIN/GLOB SERPL: 1.6 {RATIO} (ref 1.1–2.2)
ALP SERPL-CCNC: 64 U/L (ref 40–129)
ALT SERPL-CCNC: 21 U/L (ref 10–40)
AMPHETAMINES UR QL SCN>1000 NG/ML: NORMAL
ANION GAP SERPL CALCULATED.3IONS-SCNC: 8 MMOL/L (ref 3–16)
APAP SERPL-MCNC: <5 UG/ML (ref 10–30)
AST SERPL-CCNC: 20 U/L (ref 15–37)
BARBITURATES UR QL SCN>200 NG/ML: NORMAL
BASE EXCESS BLDV CALC-SCNC: -0.5 MMOL/L (ref -3–3)
BASOPHILS # BLD: 0 K/UL (ref 0–0.2)
BASOPHILS NFR BLD: 0.2 %
BENZODIAZ UR QL SCN>200 NG/ML: NORMAL
BILIRUB SERPL-MCNC: <0.2 MG/DL (ref 0–1)
BILIRUB UR QL STRIP.AUTO: NEGATIVE
BUN SERPL-MCNC: 22 MG/DL (ref 7–20)
CALCIUM SERPL-MCNC: 8.7 MG/DL (ref 8.3–10.6)
CANNABINOIDS UR QL SCN>50 NG/ML: NORMAL
CHLORIDE SERPL-SCNC: 101 MMOL/L (ref 99–110)
CLARITY UR: CLEAR
CO2 BLDV-SCNC: 25 MMOL/L
CO2 SERPL-SCNC: 24 MMOL/L (ref 21–32)
COCAINE UR QL SCN: NORMAL
COHGB MFR BLDV: 0.8 % (ref 0–1.5)
COLOR UR: YELLOW
CREAT SERPL-MCNC: 1 MG/DL (ref 0.8–1.3)
DEPRECATED RDW RBC AUTO: 13 % (ref 12.4–15.4)
DRUG SCREEN COMMENT UR-IMP: NORMAL
EKG ATRIAL RATE: 74 BPM
EKG DIAGNOSIS: NORMAL
EKG P AXIS: 17 DEGREES
EKG P-R INTERVAL: 170 MS
EKG Q-T INTERVAL: 416 MS
EKG QRS DURATION: 148 MS
EKG QTC CALCULATION (BAZETT): 461 MS
EKG R AXIS: 7 DEGREES
EKG T AXIS: 22 DEGREES
EKG VENTRICULAR RATE: 74 BPM
EOSINOPHIL # BLD: 0 K/UL (ref 0–0.6)
EOSINOPHIL NFR BLD: 0.6 %
ETHANOLAMINE SERPL-MCNC: NORMAL MG/DL (ref 0–0.08)
FENTANYL SCREEN, URINE: NORMAL
FLUAV RNA RESP QL NAA+PROBE: NOT DETECTED
FLUBV RNA RESP QL NAA+PROBE: NOT DETECTED
GFR SERPLBLD CREATININE-BSD FMLA CKD-EPI: 75 ML/MIN/{1.73_M2}
GLUCOSE SERPL-MCNC: 153 MG/DL (ref 70–99)
GLUCOSE UR STRIP.AUTO-MCNC: 500 MG/DL
HCO3 BLDV-SCNC: 23.6 MMOL/L (ref 23–29)
HCT VFR BLD AUTO: 44.7 % (ref 40.5–52.5)
HGB BLD-MCNC: 15.2 G/DL (ref 13.5–17.5)
HGB UR QL STRIP.AUTO: NEGATIVE
KETONES UR STRIP.AUTO-MCNC: NEGATIVE MG/DL
LEUKOCYTE ESTERASE UR QL STRIP.AUTO: NEGATIVE
LYMPHOCYTES # BLD: 0.7 K/UL (ref 1–5.1)
LYMPHOCYTES NFR BLD: 8.7 %
MCH RBC QN AUTO: 32.7 PG (ref 26–34)
MCHC RBC AUTO-ENTMCNC: 34 G/DL (ref 31–36)
MCV RBC AUTO: 96.1 FL (ref 80–100)
METHADONE UR QL SCN>300 NG/ML: NORMAL
METHGB MFR BLDV: 0 %
MONOCYTES # BLD: 0.6 K/UL (ref 0–1.3)
MONOCYTES NFR BLD: 7.2 %
NEUTROPHILS # BLD: 6.5 K/UL (ref 1.7–7.7)
NEUTROPHILS NFR BLD: 83.3 %
NITRITE UR QL STRIP.AUTO: NEGATIVE
O2 CT VFR BLDV CALC: 20 VOL %
O2 THERAPY: ABNORMAL
OPIATES UR QL SCN>300 NG/ML: NORMAL
OXYCODONE UR QL SCN: NORMAL
PCO2 BLDV: 37.4 MMHG (ref 40–50)
PCP UR QL SCN>25 NG/ML: NORMAL
PH BLDV: 7.42 [PH] (ref 7.35–7.45)
PH UR STRIP.AUTO: 6 [PH] (ref 5–8)
PH UR STRIP: 6 [PH]
PLATELET # BLD AUTO: 263 K/UL (ref 135–450)
PMV BLD AUTO: 6.5 FL (ref 5–10.5)
PO2 BLDV: 77.3 MMHG (ref 25–40)
POTASSIUM SERPL-SCNC: 4 MMOL/L (ref 3.5–5.1)
PROT SERPL-MCNC: 5.9 G/DL (ref 6.4–8.2)
PROT UR STRIP.AUTO-MCNC: NEGATIVE MG/DL
RBC # BLD AUTO: 4.65 M/UL (ref 4.2–5.9)
SALICYLATES SERPL-MCNC: <0.5 MG/DL (ref 15–30)
SAO2 % BLDV: 96 %
SARS-COV-2 RNA RESP QL NAA+PROBE: NOT DETECTED
SODIUM SERPL-SCNC: 133 MMOL/L (ref 136–145)
SP GR UR STRIP.AUTO: 1.01 (ref 1–1.03)
UA COMPLETE W REFLEX CULTURE PNL UR: ABNORMAL
UA DIPSTICK W REFLEX MICRO PNL UR: ABNORMAL
URN SPEC COLLECT METH UR: ABNORMAL
UROBILINOGEN UR STRIP-ACNC: 0.2 E.U./DL
WBC # BLD AUTO: 7.9 K/UL (ref 4–11)

## 2025-03-25 PROCEDURE — 2000000000 HC ICU R&B

## 2025-03-25 PROCEDURE — 80143 DRUG ASSAY ACETAMINOPHEN: CPT

## 2025-03-25 PROCEDURE — 99223 1ST HOSP IP/OBS HIGH 75: CPT | Performed by: INTERNAL MEDICINE

## 2025-03-25 PROCEDURE — 36415 COLL VENOUS BLD VENIPUNCTURE: CPT

## 2025-03-25 PROCEDURE — 80053 COMPREHEN METABOLIC PANEL: CPT

## 2025-03-25 PROCEDURE — 99285 EMERGENCY DEPT VISIT HI MDM: CPT

## 2025-03-25 PROCEDURE — 93010 ELECTROCARDIOGRAM REPORT: CPT | Performed by: INTERNAL MEDICINE

## 2025-03-25 PROCEDURE — 2580000003 HC RX 258

## 2025-03-25 PROCEDURE — 93005 ELECTROCARDIOGRAM TRACING: CPT | Performed by: EMERGENCY MEDICINE

## 2025-03-25 PROCEDURE — 87636 SARSCOV2 & INF A&B AMP PRB: CPT

## 2025-03-25 PROCEDURE — 2500000003 HC RX 250 WO HCPCS

## 2025-03-25 PROCEDURE — 71045 X-RAY EXAM CHEST 1 VIEW: CPT

## 2025-03-25 PROCEDURE — 80179 DRUG ASSAY SALICYLATE: CPT

## 2025-03-25 PROCEDURE — 85025 COMPLETE CBC W/AUTO DIFF WBC: CPT

## 2025-03-25 PROCEDURE — 81003 URINALYSIS AUTO W/O SCOPE: CPT

## 2025-03-25 PROCEDURE — 82077 ASSAY SPEC XCP UR&BREATH IA: CPT

## 2025-03-25 PROCEDURE — 6370000000 HC RX 637 (ALT 250 FOR IP): Performed by: INTERNAL MEDICINE

## 2025-03-25 PROCEDURE — 80307 DRUG TEST PRSMV CHEM ANLYZR: CPT

## 2025-03-25 PROCEDURE — 82803 BLOOD GASES ANY COMBINATION: CPT

## 2025-03-25 PROCEDURE — 2580000003 HC RX 258: Performed by: EMERGENCY MEDICINE

## 2025-03-25 RX ORDER — POLYETHYLENE GLYCOL 3350 17 G/17G
17 POWDER, FOR SOLUTION ORAL DAILY PRN
Status: DISCONTINUED | OUTPATIENT
Start: 2025-03-25 | End: 2025-03-27 | Stop reason: HOSPADM

## 2025-03-25 RX ORDER — SODIUM CHLORIDE 9 MG/ML
INJECTION, SOLUTION INTRAVENOUS CONTINUOUS
Status: DISCONTINUED | OUTPATIENT
Start: 2025-03-25 | End: 2025-03-26

## 2025-03-25 RX ORDER — SODIUM CHLORIDE 0.9 % (FLUSH) 0.9 %
5-40 SYRINGE (ML) INJECTION EVERY 12 HOURS SCHEDULED
Status: DISCONTINUED | OUTPATIENT
Start: 2025-03-25 | End: 2025-03-27 | Stop reason: HOSPADM

## 2025-03-25 RX ORDER — ENOXAPARIN SODIUM 100 MG/ML
40 INJECTION SUBCUTANEOUS DAILY
Status: DISCONTINUED | OUTPATIENT
Start: 2025-03-25 | End: 2025-03-27 | Stop reason: HOSPADM

## 2025-03-25 RX ORDER — MUPIROCIN 20 MG/G
OINTMENT TOPICAL 2 TIMES DAILY
Status: DISCONTINUED | OUTPATIENT
Start: 2025-03-25 | End: 2025-03-27 | Stop reason: HOSPADM

## 2025-03-25 RX ORDER — ONDANSETRON 2 MG/ML
4 INJECTION INTRAMUSCULAR; INTRAVENOUS EVERY 6 HOURS PRN
Status: DISCONTINUED | OUTPATIENT
Start: 2025-03-25 | End: 2025-03-27 | Stop reason: HOSPADM

## 2025-03-25 RX ORDER — SODIUM CHLORIDE 9 MG/ML
INJECTION, SOLUTION INTRAVENOUS PRN
Status: DISCONTINUED | OUTPATIENT
Start: 2025-03-25 | End: 2025-03-27 | Stop reason: HOSPADM

## 2025-03-25 RX ORDER — MAGNESIUM SULFATE 1 G/100ML
1000 INJECTION INTRAVENOUS PRN
Status: DISCONTINUED | OUTPATIENT
Start: 2025-03-25 | End: 2025-03-27 | Stop reason: HOSPADM

## 2025-03-25 RX ORDER — POTASSIUM CHLORIDE 7.45 MG/ML
10 INJECTION INTRAVENOUS PRN
Status: DISCONTINUED | OUTPATIENT
Start: 2025-03-25 | End: 2025-03-27 | Stop reason: HOSPADM

## 2025-03-25 RX ORDER — ACETAMINOPHEN 650 MG/1
650 SUPPOSITORY RECTAL EVERY 6 HOURS PRN
Status: DISCONTINUED | OUTPATIENT
Start: 2025-03-25 | End: 2025-03-27 | Stop reason: HOSPADM

## 2025-03-25 RX ORDER — 0.9 % SODIUM CHLORIDE 0.9 %
500 INTRAVENOUS SOLUTION INTRAVENOUS ONCE
Status: COMPLETED | OUTPATIENT
Start: 2025-03-25 | End: 2025-03-25

## 2025-03-25 RX ORDER — ONDANSETRON 4 MG/1
4 TABLET, ORALLY DISINTEGRATING ORAL EVERY 8 HOURS PRN
Status: DISCONTINUED | OUTPATIENT
Start: 2025-03-25 | End: 2025-03-27 | Stop reason: HOSPADM

## 2025-03-25 RX ORDER — ACETAMINOPHEN 325 MG/1
650 TABLET ORAL EVERY 6 HOURS PRN
Status: DISCONTINUED | OUTPATIENT
Start: 2025-03-25 | End: 2025-03-27 | Stop reason: HOSPADM

## 2025-03-25 RX ORDER — SODIUM CHLORIDE 0.9 % (FLUSH) 0.9 %
10 SYRINGE (ML) INJECTION PRN
Status: DISCONTINUED | OUTPATIENT
Start: 2025-03-25 | End: 2025-03-27 | Stop reason: HOSPADM

## 2025-03-25 RX ORDER — POTASSIUM CHLORIDE 750 MG/1
40 TABLET, EXTENDED RELEASE ORAL PRN
Status: DISCONTINUED | OUTPATIENT
Start: 2025-03-25 | End: 2025-03-27 | Stop reason: HOSPADM

## 2025-03-25 RX ADMIN — SODIUM CHLORIDE 500 ML: 0.9 INJECTION, SOLUTION INTRAVENOUS at 11:10

## 2025-03-25 RX ADMIN — Medication 10 ML: at 21:05

## 2025-03-25 RX ADMIN — MUPIROCIN: 20 OINTMENT TOPICAL at 21:05

## 2025-03-25 RX ADMIN — SODIUM CHLORIDE: 0.9 INJECTION, SOLUTION INTRAVENOUS at 18:43

## 2025-03-25 ASSESSMENT — PAIN - FUNCTIONAL ASSESSMENT: PAIN_FUNCTIONAL_ASSESSMENT: NONE - DENIES PAIN

## 2025-03-25 NOTE — ED PROVIDER NOTES
[facility] EMERGENCY DEPARTMENT  EMERGENCY DEPARTMENT ENCOUNTER      Pt Name: Derrick Paredes  MRN: [id number]  Birthdate 1945  Date of evaluation: 3/25/2025  Provider: Yovanny Hathaway MD  Time Note started  8:31 AM EDT  3/25/25           NURSING NOTES REVIEWED     Pt evaluated in a timely manner      CURRENT MEDICATIONS       Previous Medications    ASPIRIN 81 MG EC TABLET    Take 1 tablet by mouth in the morning and at bedtime    BUSPIRONE (BUSPAR) 5 MG TABLET    Take 1 tablet by mouth 2 times daily    DOXEPIN (SINEQUAN) 100 MG CAPSULE    Take 1 capsule by mouth nightly    EZETIMIBE (ZETIA) 10 MG TABLET    Take 1 tablet by mouth nightly    HYDROXYZINE HCL (ATARAX) 10 MG TABLET    Take 1 tablet by mouth 3 times daily as needed for Itching    METOPROLOL SUCCINATE (TOPROL XL) 25 MG EXTENDED RELEASE TABLET    Take 1 tablet by mouth at bedtime    MUPIROCIN (BACTROBAN) 2 % OINTMENT    Apply 1 each topically 3 times daily Apply topically 3 times daily on rash    PRAVASTATIN (PRAVACHOL) 80 MG TABLET    Take 0.5 tablets by mouth daily       ALLERGIES     Losartan, Atorvastatin, Lisinopril, and Prednisone    FAMILY HISTORY       Family History   Problem Relation Age of Onset    Diabetes Mother     Heart Disease Father           SOCIAL HISTORY       Social History     Socioeconomic History    Marital status:      Spouse name: None    Number of children: None    Years of education: None    Highest education level: None   Tobacco Use    Smoking status: Former     Current packs/day: 0.00     Types: Cigarettes     Quit date: 1985     Years since quittin.2    Smokeless tobacco: Never   Vaping Use    Vaping status: Never Used   Substance and Sexual Activity    Alcohol use: No    Drug use: Never    Sexual activity: Yes     Partners: Female     Social Drivers of Health     Financial Resource Strain: Low Risk  (2025)    Overall Financial Resource Strain (CARDIA)     Difficulty of Paying Living

## 2025-03-25 NOTE — PLAN OF CARE
Problem: Self Harm/Suicidality  Goal: Will have no self-injury during hospital stay  Description: INTERVENTIONS:  1.  Ensure constant observer at bedside with Q15M safety checks  2.  Maintain a safe environment  3.  Secure patient belongings  4.  Ensure family/visitors adhere to safety recommendations  5.  Ensure safety tray has been added to patient's diet order  6.  Every shift and PRN: Re-assess suicidal risk via Frequent Screener    Outcome: Not Progressing     Problem: Self Harm/Suicidality  Goal: Will have no self-injury during hospital stay  Description: INTERVENTIONS:  1.  Ensure constant observer at bedside with Q15M safety checks  2.  Maintain a safe environment  3.  Secure patient belongings  4.  Ensure family/visitors adhere to safety recommendations  5.  Ensure safety tray has been added to patient's diet order  6.  Every shift and PRN: Re-assess suicidal risk via Frequent Screener    Outcome: Not Progressing

## 2025-03-25 NOTE — PLAN OF CARE
Intentional drug overdose       Patient endorses taking 6 metoprolol 25 mg/3 doxepin 100 mg/3 BuSpar 5 mg     Poison control recommends IV fluids   if patient became symptomatic bradycardic atropine     Patient nonresponsive to fluids may utilize glucagon 5 mg x 1 thereafter repeat to 10 mg and 5 minutes     Thereafter if patient remains symptomatic may consider high-dose insulin 1 unit/kg followed by 0.5 units/kg/h with necessary glucose infusion     Recommend a period of observation 4 to 6 hours until symptom-free      ICU admit     YAW Marinelli  03/25/25  12:06 PM

## 2025-03-25 NOTE — H&P
History and Physical      Chief Complaint   Patient presents with    Suicide Attempt     Took unknown about of meds in attempt to end his life.  States he hopes he does not survive this.         HISTORY OF PRESENT ILLNESS:     The patient is an 79-year-old white male with a history of depression.  He was discharged from the psych floor about a month ago for depression.  He presented to the emergency room with ingestion of 7 tablets of 25 mg of metoprolol succinate.  He also took doxepin 3 tablets 100 mg each and 3 tabs of BuSpar.  Patient is admitted to hospital to the ICU for further care.  When I saw the patient he was tell me he was depressed.  His wife tells me is a history of PTSD.  His son apparently committed suicide 35 years ago.  He wakes up depressed every day.  He denies any chest pain or shortness of breath.    He has a past medical history of CAD hypertension PTSD and hyperlipidemia.    Patient is allergic to losartan, atorvastatin, lisinopril, and prednisone.    Past Medical History:   Diagnosis Date    Arthritis     joints    CAD (coronary artery disease) 01/01/2008    stents x2    Diabetes mellitus (HCC)     diet controlled    Hyperlipidemia     Hypertension     Panic anxiety syndrome     Sleep apnea     never returned for appliance       Past Surgical History:   Procedure Laterality Date    ANKLE FRACTURE SURGERY Right 8/25/2022    OPEN REDUCTION INTERNAL FIXATION RIGHT BIMALLEOLAR ANKLE FRACTURE -BLOCK performed by Ld Mack MD at [facility] OR    APPENDECTOMY      CARDIAC SURGERY      2 stents ~ 2012    CHOLECYSTECTOMY      COLONOSCOPY  12/16/2010    COLONOSCOPY  02/24/2015    [facility]    HERNIA REPAIR      bilateral inguinal    UPPER GASTROINTESTINAL ENDOSCOPY N/A 04/08/2021    EGD BIOPSY performed by Javed Garcia MD at [facility] ENDOSCOPY       Current Outpatient Medications   Medication Instructions    aspirin 81 mg, Oral, 2 times daily    busPIRone (BUSPAR) 5 mg, Oral, 2 TIMES DAILY

## 2025-03-25 NOTE — ED NOTES
MD Hathaway spoke to the family in the conference room. The patients family is able to come back to the patients room at this time and see him per doctors order. The family is aware that they can come back.

## 2025-03-25 NOTE — PROGRESS NOTES
Patient brought to ICU 13. VSS, assessment complete see flowsheet. Orders released. Sitter at bedside for SI precautions. SI precautions in place.     BP: 120/76  Temp: 97.7  HR: 56  RR: 16    4 Eyes Skin Assessment     NAME:  Derrick Paredes  YOB: 1945  MEDICAL RECORD NUMBER:  [id number]    The patient is being assessed for  Admission    I agree that at least one RN has performed a thorough Head to Toe Skin Assessment on the patient. ALL assessment sites listed below have been assessed.      Areas assessed by both nurses:    Head, Face, Ears, Shoulders, Back, Chest, Arms, Elbows, Hands, Sacrum. Buttock, Coccyx, Ischium, Legs. Feet and Heels, and Under Medical Devices         Does the Patient have a Wound? No noted wound(s)       Finesse Prevention initiated by RN: Yes  Wound Care Orders initiated by RN: No    Pressure Injury (Stage 3,4, Unstageable, DTI, NWPT, and Complex wounds) if present, place Wound referral order by RN under : No    New Ostomies, if present place, Ostomy referral order under : No     Nurse 1 eSignature: Electronically signed by Kandace Thompson RN on 3/25/25 at 5:05 PM EDT    **SHARE this note so that the co-signing nurse can place an eSignature**    Nurse 2 eSignature: Electronically signed by Starla Gerardo RN on 3/25/25 at 1:53 PM EDT

## 2025-03-25 NOTE — CARE COORDINATION
Case Management Assessment  Initial Evaluation    Date/Time of Evaluation: 3/25/2025 1:21 PM  Assessment Completed by: MERVIN Bender    If patient is discharged prior to next notation, then this note serves as note for discharge by case management.    Patient Name: Derrick Paredes                   YOB: 1945  Diagnosis: Intentional drug overdose, initial encounter ([facility]) [T50.902A]                   Date / Time: 3/25/2025  8:28 AM    Patient Admission Status: Inpatient   Readmission Risk (Low < 19, Mod (19-27), High > 27): Readmission Risk Score: 10.5    Current PCP: Sidney Kelley MD  PCP verified by CM? Yes    Chart Reviewed: Yes      History Provided by: Medical Record  Patient Orientation: Alert and Oriented    Patient Cognition: Alert    Hospitalization in the last 30 days (Readmission):  Yes    If yes, Readmission Assessment in  Navigator will be completed.    Advance Directives:      Code Status: Prior   Patient's Primary Decision Maker is: Legal Next of Kin    Primary Decision Maker: Luz Marina Issa - Spouse - [phone]    Discharge Planning:    Patient lives with: Spouse/Significant Other Type of Home: House  Primary Care Giver: Self  Patient Support Systems include: Spouse/Significant Other, Children, Family Members   Current Financial resources: Medicare,  ([facility])  Current community resources: None  Current services prior to admission: None            Current DME:              Type of Home Care services:  None    ADLS  Prior functional level: Independent in ADLs/IADLs  Current functional level: Independent in ADLs/IADLs    PT AM-PAC:   /24  OT AM-PAC:   /24    Family can provide assistance at DC: Yes  Would you like Case Management to discuss the discharge plan with any other family members/significant others, and if so, who? Yes (Wife)  Plans to Return to Present Housing: Yes  Other Identified Issues/Barriers to RETURNING to current housing: Intentional self harm  Potential

## 2025-03-26 LAB
ANION GAP SERPL CALCULATED.3IONS-SCNC: 9 MMOL/L (ref 3–16)
BASOPHILS # BLD: 0 K/UL (ref 0–0.2)
BASOPHILS NFR BLD: 0.3 %
BUN SERPL-MCNC: 21 MG/DL (ref 7–20)
CALCIUM SERPL-MCNC: 8.1 MG/DL (ref 8.3–10.6)
CHLORIDE SERPL-SCNC: 108 MMOL/L (ref 99–110)
CO2 SERPL-SCNC: 24 MMOL/L (ref 21–32)
CREAT SERPL-MCNC: 1 MG/DL (ref 0.8–1.3)
DEPRECATED RDW RBC AUTO: 12.8 % (ref 12.4–15.4)
EOSINOPHIL # BLD: 0.3 K/UL (ref 0–0.6)
EOSINOPHIL NFR BLD: 4.5 %
GFR SERPLBLD CREATININE-BSD FMLA CKD-EPI: 75 ML/MIN/{1.73_M2}
GLUCOSE SERPL-MCNC: 93 MG/DL (ref 70–99)
HCT VFR BLD AUTO: 43.7 % (ref 40.5–52.5)
HGB BLD-MCNC: 14.8 G/DL (ref 13.5–17.5)
LYMPHOCYTES # BLD: 1.4 K/UL (ref 1–5.1)
LYMPHOCYTES NFR BLD: 20.3 %
MCH RBC QN AUTO: 32.4 PG (ref 26–34)
MCHC RBC AUTO-ENTMCNC: 33.8 G/DL (ref 31–36)
MCV RBC AUTO: 96 FL (ref 80–100)
MONOCYTES # BLD: 0.7 K/UL (ref 0–1.3)
MONOCYTES NFR BLD: 10 %
NEUTROPHILS # BLD: 4.6 K/UL (ref 1.7–7.7)
NEUTROPHILS NFR BLD: 64.9 %
PLATELET # BLD AUTO: 261 K/UL (ref 135–450)
PMV BLD AUTO: 6.6 FL (ref 5–10.5)
POTASSIUM SERPL-SCNC: 4.3 MMOL/L (ref 3.5–5.1)
RBC # BLD AUTO: 4.56 M/UL (ref 4.2–5.9)
SODIUM SERPL-SCNC: 141 MMOL/L (ref 136–145)
WBC # BLD AUTO: 7.1 K/UL (ref 4–11)

## 2025-03-26 PROCEDURE — 94761 N-INVAS EAR/PLS OXIMETRY MLT: CPT

## 2025-03-26 PROCEDURE — 99223 1ST HOSP IP/OBS HIGH 75: CPT | Performed by: PSYCHIATRY & NEUROLOGY

## 2025-03-26 PROCEDURE — 2060000000 HC ICU INTERMEDIATE R&B

## 2025-03-26 PROCEDURE — 6360000002 HC RX W HCPCS

## 2025-03-26 PROCEDURE — 80048 BASIC METABOLIC PNL TOTAL CA: CPT

## 2025-03-26 PROCEDURE — 2700000000 HC OXYGEN THERAPY PER DAY

## 2025-03-26 PROCEDURE — 2500000003 HC RX 250 WO HCPCS

## 2025-03-26 PROCEDURE — 99233 SBSQ HOSP IP/OBS HIGH 50: CPT | Performed by: INTERNAL MEDICINE

## 2025-03-26 PROCEDURE — 36415 COLL VENOUS BLD VENIPUNCTURE: CPT

## 2025-03-26 PROCEDURE — 6370000000 HC RX 637 (ALT 250 FOR IP): Performed by: INTERNAL MEDICINE

## 2025-03-26 PROCEDURE — 85025 COMPLETE CBC W/AUTO DIFF WBC: CPT

## 2025-03-26 RX ORDER — BUSPIRONE HYDROCHLORIDE 10 MG/1
10 TABLET ORAL 2 TIMES DAILY
Status: DISCONTINUED | OUTPATIENT
Start: 2025-03-26 | End: 2025-03-27 | Stop reason: HOSPADM

## 2025-03-26 RX ORDER — PRAVASTATIN SODIUM 40 MG
40 TABLET ORAL DAILY
Status: DISCONTINUED | OUTPATIENT
Start: 2025-03-26 | End: 2025-03-27 | Stop reason: HOSPADM

## 2025-03-26 RX ORDER — EZETIMIBE 10 MG/1
10 TABLET ORAL NIGHTLY
Status: DISCONTINUED | OUTPATIENT
Start: 2025-03-26 | End: 2025-03-27 | Stop reason: HOSPADM

## 2025-03-26 RX ORDER — HYDROXYZINE HYDROCHLORIDE 10 MG/1
10 TABLET, FILM COATED ORAL 4 TIMES DAILY PRN
Status: DISCONTINUED | OUTPATIENT
Start: 2025-03-26 | End: 2025-03-27 | Stop reason: HOSPADM

## 2025-03-26 RX ORDER — ASPIRIN 81 MG/1
81 TABLET ORAL 2 TIMES DAILY
Status: DISCONTINUED | OUTPATIENT
Start: 2025-03-26 | End: 2025-03-27 | Stop reason: HOSPADM

## 2025-03-26 RX ADMIN — BUSPIRONE HYDROCHLORIDE 10 MG: 10 TABLET ORAL at 15:28

## 2025-03-26 RX ADMIN — EZETIMIBE 10 MG: 10 TABLET ORAL at 20:47

## 2025-03-26 RX ADMIN — ASPIRIN 81 MG: 81 TABLET, COATED ORAL at 20:47

## 2025-03-26 RX ADMIN — PRAVASTATIN SODIUM 40 MG: 40 TABLET ORAL at 15:28

## 2025-03-26 RX ADMIN — ENOXAPARIN SODIUM 40 MG: 100 INJECTION SUBCUTANEOUS at 10:33

## 2025-03-26 RX ADMIN — Medication 10 ML: at 15:29

## 2025-03-26 RX ADMIN — MUPIROCIN: 20 OINTMENT TOPICAL at 20:48

## 2025-03-26 RX ADMIN — ASPIRIN 81 MG: 81 TABLET, COATED ORAL at 15:28

## 2025-03-26 RX ADMIN — BUSPIRONE HYDROCHLORIDE 10 MG: 10 TABLET ORAL at 20:47

## 2025-03-26 RX ADMIN — Medication 10 ML: at 20:48

## 2025-03-26 ASSESSMENT — PAIN SCALES - GENERAL: PAINLEVEL_OUTOF10: 0

## 2025-03-26 NOTE — DISCHARGE INSTR - LAB
If you need to talk, the [facility] is here.  At the [facility], we understand that life's challenges can sometimes be difficult. Whether you're facing mental health struggles, emotional distress, alcohol or drug use concerns, or just need someone to talk to, our caring counselors are here for you. You are not alone.

## 2025-03-26 NOTE — CONSULTS
Psychiatric Consult     Dx:   Major Depression severe recurrent  MARTITA  Overdose intentional     Rec:  Patient had an intentional overdose to kill himself  He requires inpatient psychiatric tx  [facility] to [facility]    Admit Date:  3/25/2025    Consult Date:  3/26/2025   Id Info: 78 yo WM  Reason for Consult: Overdose  Summary Present Illness:   Per HP  Took unknown about of meds in attempt to end his life.  States he hopes he does not survive this.         HISTORY OF PRESENT ILLNESS:      The patient is an 79-year-old white male with a history of depression.  He was discharged from the psych floor about a month ago for depression.  He presented to the emergency room with ingestion of 7 tablets of 25 mg of metoprolol succinate.  He also took doxepin 3 tablets 100 mg each and 3 tabs of BuSpar.  Patient is admitted to hospital to the ICU for further care.  When I saw the patient he was tell me he was depressed.  His wife tells me is a history of PTSD.  His son apparently committed suicide 35 years ago.  He wakes up depressed every day.  He denies any chest pain or shortness of breath.     Patient stated that he cannot enjoy life at times. He has periods of feeling well and then his life will flip and he feels depressed and that he doesn't deserve to live. He ingested to pills at home and wife was home in another part of house. He called the crisis line who send EMs and then he told wife. His daughter and wife were present in the interview. They agreed that he struggles with anxietya nd unable to predict when he will decompensate. We discussed follow up in therapy rather than only pharmacotherapy.  [facility].   He was not actively suicidal today but his attempt was to die        Psychiatric Hx: [facility] 2/22/25    79-year-old male presented with anxiety and passive suicidal ideation.  He came from home and stated that

## 2025-03-26 NOTE — PROGRESS NOTES
[facility]                                 Pulmonary/Critical care  Consult /Progress Note :                                                                  CC :drug over dose   Suicidal attempts   Patient is being seen at the request of DR Carrillo  for a consultation for drug overdose    Subjective   Doing great   HR as low as 49 but asymptomatic    PHYSICAL EXAM:  Vitals:    03/26/25 0800   BP: 111/82   Pulse: 53   Resp: 13   Temp:    SpO2: 96%     Gen: No distress.   Eyes: PERRL. No sclera icterus. No conjunctival injection.   ENT: No discharge. Pharynx clear.   Neck: Trachea midline. No obvious mass.    Resp: No accessory muscle use. No crackles. No wheezes. No rhonchi. No dullness on percussion.  CV: Regular rate. Regular rhythm. No murmur or rub. No edema. Peripheral pulses are 2+.  Capillary refill is less than 3 seconds.  GI: Non-tender. Non-distended. No hernia.   Skin: Warm and dry. No nodule on exposed extremities.   Lymph: No cervical LAD. No supraclavicular LAD.   M/S: No cyanosis. No joint deformity. No clubbing.   Neuro: Awake. Alert. Moves all four extremities.   Psych: Oriented x 3. No anxiety.     LABS:  CBC:   Recent Labs     03/25/25  1004 03/26/25  0507   WBC 7.9 7.1   HGB 15.2 14.8   HCT 44.7 43.7   MCV 96.1 96.0    261     BMP:   Recent Labs     03/25/25  1004 03/26/25  0507   * 141   K 4.0 4.3    108   CO2 24 24   BUN 22* 21*   CREATININE 1.0 1.0     LIVER PROFILE:   Recent Labs     03/25/25  1004   AST 20   ALT 21   BILITOT <0.2   ALKPHOS 64     PT/INR: No results for input(s): \"PROTIME\", \"INR\" in the last 72 hours.  APTT: No results for input(s): \"APTT\" in the last 72 hours.  UA:  Recent Labs     03/25/25  1118   COLORU Yellow   PHUR 6.0  6.0   CLARITYU Clear   LEUKOCYTESUR Negative   UROBILINOGEN 0.2   BILIRUBINUR Negative   BLOODU Negative   GLUCOSEU 500*

## 2025-03-26 NOTE — PROGRESS NOTES
Shift assessment complete. Patient sitting up in bed watching television and talking with the sitter in room.        03/25/25 2000   Vitals   Pulse 56   Respirations 19   /84   MAP (Calculated) 90   MAP (mmHg) 92     VSS    Room air with Spo2 95%    All suicidal protocols active.     Patient room has been modified to remove extraneous ligature risks prior to patient arrival. Patient oriented to room and instructed about the schedule of the day including: vital sign frequency, lab draws, possible tests, frequency of MD and staff rounds, daily weights, I &O's and prescribed diet. Constant Observer at bedside, patient aware of placement and reason.     Patient requested supplies for bathing. Supplies were given to the sitter in room. The sitter is assisting patient with bathing.     For safety, patient is placed in a hospital gown without ties.     .Electronically signed by Rose Rosario RN on 3/25/2025 at 9:31 PM

## 2025-03-26 NOTE — CONSULTS
[facility]                                 Pulmonary/Critical care  Consult /Progress Note :                                                                  CC :drug over dose   Suicidal attempts   Patient is being seen at the request of DR Carrillo  for a consultation for drug overdose    HISTORY OF PRESENT ILLNESS:     patient is an 79-year-old white male with a history of depression.       He only smoke for 10 years     He presented to the emergency room with ingestion of 6-7 tablets of 25 mg of metoprolol succinate.  He also took doxepin 3 tablets 100 mg each and 3 tabs of BuSpar. He was asymptomatic when I saw with heart 60 .     Patient is admitted to hospital to the ICU for further care.      Patient seems has a lot going on ,lost son with suicidal attempts  wife told IM team that  pt  history of PTSD.  H      .  He denies any chest pain or shortness of breath.     PAST MEDICAL HISTORY:  Past Medical History:   Diagnosis Date    Arthritis     joints    CAD (coronary artery disease) 01/01/2008    stents x2    Diabetes mellitus (HCC)     diet controlled    Hyperlipidemia     Hypertension     Panic anxiety syndrome     Sleep apnea     never returned for appliance     PAST SURGICAL HISTORY:  Past Surgical History:   Procedure Laterality Date    ANKLE FRACTURE SURGERY Right 8/25/2022    OPEN REDUCTION INTERNAL FIXATION RIGHT BIMALLEOLAR ANKLE FRACTURE -BLOCK performed by Ld Mack MD at [facility] OR    APPENDECTOMY      CARDIAC SURGERY      2 stents ~ 2012    CHOLECYSTECTOMY      COLONOSCOPY  12/16/2010    COLONOSCOPY  02/24/2015    [facility]    HERNIA REPAIR      bilateral inguinal    UPPER GASTROINTESTINAL ENDOSCOPY N/A 04/08/2021    EGD BIOPSY performed by Javed Garcia MD at [facility] ENDOSCOPY       FAMILY HISTORY:  family history includes Diabetes in his mother; Heart Disease in his father.    SOCIAL HISTORY:   reports

## 2025-03-26 NOTE — PROGRESS NOTES
Patient resting quietly in bed with eyes closed. Respirations E&E.     Sinus bradycardia at 54 BPM. VSS    Sitter remains at bedside for suicide precautions.    Electronically signed by Rose Rosario RN on 3/26/2025 at 1:16 AM

## 2025-03-26 NOTE — PROGRESS NOTES
Patient VSS, suicide precautions in place, sitter at bedside. [facility] and will arrange transport for tomorrow. Family has been updated

## 2025-03-26 NOTE — PROGRESS NOTES
Admit: 3/25/2025    Name:  Derrick Paredes  Room:  [address] [place] [address]  MRN:    [id number]    Critical Care Daily Progress Note for 3/26/2025   Admitted with multi drug overdose      Interval History:     Doing well today  HR is in the 50s   BP is good     Scheduled Meds:   sodium chloride flush  5-40 mL IntraVENous 2 times per day    enoxaparin  40 mg SubCUTAneous Daily    mupirocin   Each Nostril BID       Continuous Infusions:   sodium chloride      sodium chloride 100 mL/hr at 25 1843       PRN Meds:  sodium chloride flush, sodium chloride, potassium chloride **OR** potassium alternative oral replacement **OR** potassium chloride, magnesium sulfate, ondansetron **OR** ondansetron, polyethylene glycol, acetaminophen **OR** acetaminophen                  Objective:     Temp  Av.1 °F (36.7 °C)  Min: 97.7 °F (36.5 °C)  Max: 98.4 °F (36.9 °C)  Pulse  Av.2  Min: 47  Max: 80  BP  Min: 95/60  Max: 126/80  SpO2  Av %  Min: 88 %  Max: 98 %  Patient Vitals for the past 4 hrs:   BP Pulse Resp SpO2   25 0602 126/80 50 12 97 %   25 0500 108/69 (!) 48 16 97 %   25 0400 120/74 (!) 48 14 98 %         Intake/Output Summary (Last 24 hours) at 3/26/2025 0744  Last data filed at 3/25/2025 1311  Gross per 24 hour   Intake 0 ml   Output --   Net 0 ml       Physical Exam:  General:  Awake, alert and oriented. Appears to be not in any distress  Mucous Membranes:  Pink , anicteric  Neck: No JVD, no carotid bruit, no thyromegaly  Chest:  Clear to auscultation bilaterally, no added sounds  Cardiovascular:  RRR S1S2 heard, no murmurs or gallops  Abdomen:  Soft, undistended, non tender, no organomegaly, BS present  Extremities: No edema or cyanosis. Distal pulses well felt  Neurological : no focal deficits    Lab Data:  CBC:   Recent Labs     25  1004 25  0507   WBC 7.9 7.1   RBC 4.65 4.56   HGB 15.2 14.8   HCT 44.7 43.7   MCV 96.1 96.0   RDW 13.0 12.8    261     BMP:   Recent Labs

## 2025-03-26 NOTE — PROGRESS NOTES
Spoke w/ Anna at transfer center to initiate transfer to [facility] per pt request. Provided patient information, unit phone number & Dr. Carroll phone number.

## 2025-03-27 VITALS
SYSTOLIC BLOOD PRESSURE: 122 MMHG | HEIGHT: 66 IN | WEIGHT: 183.6 LBS | TEMPERATURE: 97.2 F | BODY MASS INDEX: 29.51 KG/M2 | DIASTOLIC BLOOD PRESSURE: 65 MMHG | HEART RATE: 57 BPM | OXYGEN SATURATION: 96 % | RESPIRATION RATE: 18 BRPM

## 2025-03-27 PROCEDURE — 2500000003 HC RX 250 WO HCPCS

## 2025-03-27 PROCEDURE — 6370000000 HC RX 637 (ALT 250 FOR IP): Performed by: INTERNAL MEDICINE

## 2025-03-27 PROCEDURE — 94761 N-INVAS EAR/PLS OXIMETRY MLT: CPT

## 2025-03-27 PROCEDURE — 2700000000 HC OXYGEN THERAPY PER DAY

## 2025-03-27 PROCEDURE — 6360000002 HC RX W HCPCS

## 2025-03-27 PROCEDURE — 99232 SBSQ HOSP IP/OBS MODERATE 35: CPT | Performed by: INTERNAL MEDICINE

## 2025-03-27 RX ADMIN — ASPIRIN 81 MG: 81 TABLET, COATED ORAL at 08:05

## 2025-03-27 RX ADMIN — HYDROXYZINE HYDROCHLORIDE 10 MG: 10 TABLET ORAL at 03:51

## 2025-03-27 RX ADMIN — PRAVASTATIN SODIUM 40 MG: 40 TABLET ORAL at 08:05

## 2025-03-27 RX ADMIN — BUSPIRONE HYDROCHLORIDE 10 MG: 10 TABLET ORAL at 08:05

## 2025-03-27 RX ADMIN — Medication 10 ML: at 08:05

## 2025-03-27 RX ADMIN — ENOXAPARIN SODIUM 40 MG: 100 INJECTION SUBCUTANEOUS at 08:05

## 2025-03-27 NOTE — PROGRESS NOTES
Shift assessment done,patient is awake,calm,in bed,has a sitter at bedside,on suicide precautions,on room air,able to walk to the bathroom with minimal assistance,denies being in pain and is alert and oriented X 4.  Bed is at its lowest level,will continue to monitor patient.

## 2025-03-27 NOTE — PROGRESS NOTES
4 Eyes Skin Assessment     NAME:  Derrick Paredes  YOB: 1945  MEDICAL RECORD NUMBER:  [id number]    The patient is being assessed for  Transfer to New Unit    I agree that at least one RN has performed a thorough Head to Toe Skin Assessment on the patient. ALL assessment sites listed below have been assessed.      Areas assessed by both nurses:    Head, Face, Ears, Shoulders, Back, Chest, Arms, Elbows, Hands, Sacrum. Buttock, Coccyx, Ischium, and Legs. Feet and Heels    No skin issues; no noted wounds        Does the Patient have a Wound? No noted wound(s)       Finesse Prevention initiated by RN: Yes  Wound Care Orders initiated by RN: No    Pressure Injury (Stage 3,4, Unstageable, DTI, NWPT, and Complex wounds) if present, place Wound referral order by RN under : No    New Ostomies, if present place, Ostomy referral order under : No     Nurse 1 eSignature: Electronically signed by Jarred Knutson RN on 3/26/25 at 11:54 PM EDT    **SHARE this note so that the co-signing nurse can place an eSignature**    Nurse 2 eSignature: Electronically signed by Melissa Winters RN on 3/27/25 at 1:23 AM EDT

## 2025-03-27 NOTE — PROGRESS NOTES
[facility]                                 Pulmonary/Critical care  Consult /Progress Note :                                                                  CC :drug over dose   Suicidal attempts   Patient is being seen at the request of DR Carrillo  for a consultation for drug overdose    Subjective   Doing great   HR better  No events    PHYSICAL EXAM:  Vitals:    03/27/25 0741   BP: 109/68   Pulse: 61   Resp: 18   Temp: 97.3 °F (36.3 °C)   SpO2: 94%     Gen: No distress.   Eyes: PERRL. No sclera icterus. No conjunctival injection.   ENT: No discharge. Pharynx clear.   Neck: Trachea midline. No obvious mass.    Resp: No accessory muscle use. No crackles. No wheezes. No rhonchi. No dullness on percussion.  CV: Regular rate. Regular rhythm. No murmur or rub. No edema. Peripheral pulses are 2+.  Capillary refill is less than 3 seconds.  GI: Non-tender. Non-distended. No hernia.   Skin: Warm and dry. No nodule on exposed extremities.   Lymph: No cervical LAD. No supraclavicular LAD.   M/S: No cyanosis. No joint deformity. No clubbing.   Neuro: Awake. Alert. Moves all four extremities.   Psych: Oriented x 3. No anxiety.     LABS:  CBC:   Recent Labs     03/25/25  1004 03/26/25  0507   WBC 7.9 7.1   HGB 15.2 14.8   HCT 44.7 43.7   MCV 96.1 96.0    261     BMP:   Recent Labs     03/25/25  1004 03/26/25  0507   * 141   K 4.0 4.3    108   CO2 24 24   BUN 22* 21*   CREATININE 1.0 1.0     LIVER PROFILE:   Recent Labs     03/25/25  1004   AST 20   ALT 21   BILITOT <0.2   ALKPHOS 64     PT/INR: No results for input(s): \"PROTIME\", \"INR\" in the last 72 hours.  APTT: No results for input(s): \"APTT\" in the last 72 hours.  UA:  Recent Labs     03/25/25  1118   COLORU Yellow   PHUR 6.0  6.0   CLARITYU Clear   LEUKOCYTESUR Negative   UROBILINOGEN 0.2   BILIRUBINUR Negative   BLOODU Negative   GLUCOSEU 500*

## 2025-03-27 NOTE — DISCHARGE INSTR - DIET

## 2025-03-27 NOTE — DISCHARGE INSTR - COC
Continuity of Care Form    Patient Name: Derrick Paredes   :  1945  MRN:  [id number]    Admit date:  3/25/2025  Discharge date:  3/27/25    Code Status Order: Full Code   Advance Directives:     Admitting Physician:  Js Carrillo MD  PCP: Sidney Kelley MD    Discharging [facility]  Discharging Unit Phone Number: [phone]    Emergency Contact:   Extended Emergency Contact Information  Primary Emergency Contact: Luz Marina Issa  Address: [address] [place] [address]            [place] [id number]  Home Phone: [phone]  Work Phone: [phone]  Relation: Spouse  Secondary Emergency Contact: Meeta Neil  Home Phone: [phone]  Relation: Child    Past Surgical History:  Past Surgical History:   Procedure Laterality Date    ANKLE FRACTURE SURGERY Right 2022    OPEN REDUCTION INTERNAL FIXATION RIGHT BIMALLEOLAR ANKLE FRACTURE -BLOCK performed by Ld Mack MD at [facility] OR    APPENDECTOMY      CARDIAC SURGERY      2 stents ~     CHOLECYSTECTOMY      COLONOSCOPY  2010    COLONOSCOPY  2015    [facility]    HERNIA REPAIR      bilateral inguinal    UPPER GASTROINTESTINAL ENDOSCOPY N/A 2021    EGD BIOPSY performed by Javed Garcia MD at [facility] ENDOSCOPY       Immunization History:   Immunization History   Administered Date(s) Administered    COVID-19, PFIZER GRAY top, DO NOT Dilute, (age 12 y+), IM, 30 mcg/0.3 mL 2022    COVID-19, PFIZER PURPLE top, DILUTE for use, (age 12 y+), 30mcg/0.3mL 2021, 2021, 10/15/2021    COVID-19, PFIZER, , (age 12y+), IM, 30mcg/0.3mL 10/03/2023, 10/04/2024       Active Problems:  Patient Active Problem List   Diagnosis Code    Chest pain R07.9    CAD (coronary artery disease) I25.10    Abnormal EKG R94.31    Hypertension I10    Syncope and collapse R55    Spell of dizziness R42    Major depression, recurrent F33.9    Major depression, single episode F32.9    Depressive disorder

## 2025-03-27 NOTE — DISCHARGE SUMMARY
Name:  Derrick Paredes  Room:  /0308-01  MRN:    [id number]    Discharge Summary      This discharge summary is in conjunction with a complete physical exam done on the day of discharge.    Discharging Physician: Dr. Carrillo      Admit: 3/25/2025  Discharge:  3/27/2025    HPI taken from admission H&P:    The patient is an 79-year-old white male with a history of depression.  He was discharged from the psych floor about a month ago for depression.  He presented to the emergency room with ingestion of 7 tablets of 25 mg of metoprolol succinate.  He also took doxepin 3 tablets 100 mg each and 3 tabs of BuSpar.  Patient is admitted to hospital to the ICU for further care.  When I saw the patient he was tell me he was depressed.  His wife tells me is a history of PTSD.  His son apparently committed suicide 35 years ago.  He wakes up depressed every day.  He denies any chest pain or shortness of breath.     He has a past medical history of CAD hypertension PTSD and hyperlipidemia.     Patient is allergic to losartan, atorvastatin, lisinopril, and prednisone.     Diagnoses this Admission and Hospital Course   # Intentional drug overdose of metoprolol, doxepin and BuSpar.  Obviously the metoprolol is the most concerning drug in this cocktail.  He took 25 mg tablets and took 7 of them.  Poison control contacted.  ICU admit.  Intensivist consultation.  IV atropine for bradycardia.  If no response will start IV glucagon.   His HR is now in the 50s.BP is stable.  Psych consult  Need inpatient psych care.  [facility]     # Major depression.  Psychiatry consultation.  Sitter at bedside.  Suicide precautions.needs inpatient psych      # CAD.  Stable.ASA,statin.hold metoprolol      # Hyperlipidemia on Pravachol       Medically cleared for inpatient psych unit     Procedures (Please Review Full Report for Details)  N/A    Consults    Critical Care  Psychiatry       Physical Exam at Discharge:    /68

## 2025-03-27 NOTE — PROGRESS NOTES
/68   Pulse 64   Temp 97.3 °F (36.3 °C) (Oral)   Resp 18   Ht 1.676 m (5' 6\")   Wt 83.3 kg (183 lb 9.6 oz)   SpO2 95%   BMI 29.63 kg/m²     Assessment complete. Meds passed. Pt denies needs at this time. Denies current SI. Sitter at bedside, pleasant. [facility]. Received inter-facility transfer form (IFT) from transfer center, will fax and send to [facility]. Pleasant this morning, had questions about his doxepin and why he was not receiving it, educated that he was not receiving it d/t OD on this medication. Patient understanding. Ambulating around room well. Denies CP, SOB, or dizziness or OH        Bedside Mobility Assessment Tool (BMAT):     Assessment Level 1- Sit and Shake    1. From a semi-reclined position, ask patient to sit up and rotate to a seated position at the side of the bed. Can use the bedrail.    2. Ask patient to reach out and grab your hand and shake making sure patient reaches across his/her midline.   Pass- Patient is able to come to a seated position, maintain core strength. Maintains seated balance while reaching across midline. Move on to Assessment Level 2.     Assessment Level 2- Stretch and Point   1. With patient in seated position at the side of the bed, have patient place both feet on the floor (or stool) with knees no higher than hips.    2. Ask patient to stretch one leg and straighten the knee, then bend the ankle/flex and point the toes. If appropriate, repeat with the other leg.   Pass- Patient is able to demonstrate appropriate quad strength on intended weight bearing limb(s). Move onto Assessment Level 3.     Assessment Level 3- Stand   1. Ask patient to elevate off the bed or chair (seated to standing) using an assistive device (cane, bedrail).    2. Patient should be able to raise buttocks off be and hold for a count of five. May repeat once.   Pass- Patient maintains standing stability for at least 5 seconds, proceed to assessment

## 2025-03-27 NOTE — CARE COORDINATION
10:30 AM  GREER noted pt has been rec'd for an inpatient psych admission.     GREER observed transfer order. [facility]   No further CM needs.   Pt's O2 is 95% on RA.     Last IMM: 3/25    Electronically signed by MERVIN Bender on 3/27/2025 at 10:52 AM

## 2025-03-27 NOTE — PLAN OF CARE
Problem: Self Harm/Suicidality  Goal: Will have no self-injury during hospital stay  Description: INTERVENTIONS:  1.  Ensure constant observer at bedside with Q15M safety checks  2.  Maintain a safe environment  3.  Secure patient belongings  4.  Ensure family/visitors adhere to safety recommendations  5.  Ensure safety tray has been added to patient's diet order  6.  Every shift and PRN: Re-assess suicidal risk via Frequent Screener    3/27/2025 0001 by Jarred Knutson, RN  Outcome: Progressing  3/26/2025 2304 by Tatiana Pagan, RN  Outcome: Progressing

## 2025-03-27 NOTE — PLAN OF CARE
Problem: Chronic Conditions and Co-morbidities  Goal: Patient's chronic conditions and co-morbidity symptoms are monitored and maintained or improved  3/27/2025 0956 by Lars Soares RN  Outcome: Progressing  Flowsheets (Taken 3/26/2025 2341 by Jarred Knutson RN)  Care Plan - Patient's Chronic Conditions and Co-Morbidity Symptoms are Monitored and Maintained or Improved: Monitor and assess patient's chronic conditions and comorbid symptoms for stability, deterioration, or improvement  3/26/2025 2304 by Tatiana Pagan RN  Outcome: Progressing     Problem: Discharge Planning  Goal: Discharge to home or other facility with appropriate resources  3/27/2025 0956 by Lars Soares RN  Outcome: Progressing  Flowsheets (Taken 3/26/2025 2341 by Jarred Knutson RN)  Discharge to home or other facility with appropriate resources: Identify barriers to discharge with patient and caregiver  3/26/2025 2304 by Tatiana Pagan RN  Outcome: Progressing     Problem: Safety - Adult  Goal: Free from fall injury  3/27/2025 0956 by Lars Soares RN  Outcome: Progressing  3/26/2025 2304 by Tatiana Pagan RN  Outcome: Progressing     Problem: Self Harm/Suicidality  Goal: Will have no self-injury during hospital stay  Description: INTERVENTIONS:  1.  Ensure constant observer at bedside with Q15M safety checks  2.  Maintain a safe environment  3.  Secure patient belongings  4.  Ensure family/visitors adhere to safety recommendations  5.  Ensure safety tray has been added to patient's diet order  6.  Every shift and PRN: Re-assess suicidal risk via Frequent Screener    3/27/2025 0001 by Jarred Knutson RN  Outcome: Progressing  3/26/2025 2304 by Tatiana Pagan RN  Outcome: Progressing

## 2025-03-27 NOTE — PROGRESS NOTES
Patient transferred to PCU,report given to LAYLA Torres,patient transferred in a wheelchair,stable.

## (undated) DEVICE — SUTURE VCRL + SZ 2-0 L36IN ABSRB UD L36MM CT-1 1/2 CIR VCP945H

## (undated) DEVICE — ENDO CARRY-ON PROCEDURE KIT INCLUDES SUCTION TUBING, LUBRICANT, GAUZE, BIOHAZARD STICKER, TRANSPORT PAD AND INTERCEPT BEDSIDE KIT.: Brand: ENDO CARRY-ON PROCEDURE KIT

## (undated) DEVICE — DRAPE C ARM W46XL120IN XLN

## (undated) DEVICE — BIT DRL DIA2MM CALIB FOR ANK FRAC MGMT SYS

## (undated) DEVICE — SUTURE VCRL SZ 0 L36IN ABSRB UD CT-1 L36MM 1/2 CIR TAPR PNT VCP946H

## (undated) DEVICE — GUIDEWIRE ORTH L130MM DIA2MM W/ TRCR TIP FOR ANK FRAC MGMT

## (undated) DEVICE — PACK EXTREMITY XR

## (undated) DEVICE — PADDING CAST W4INXL4YD NONSTERILE COT RAYON MICROPLEATED

## (undated) DEVICE — C-ARMOR C-ARM EQUIPMENT COVERS CLEAR STERILE UNIVERSAL FIT 12 PER CASE: Brand: C-ARMOR

## (undated) DEVICE — PADDING UNDERCAST W6INXL4YD WYTEX 6 PER BG

## (undated) DEVICE — SUTURE ETHLN SZ 3-0 L18IN NONABSORBABLE BLK L24MM PS-1 3/8 1663G

## (undated) DEVICE — GLOVE SURG SZ 75 L12IN FNGR THK94MIL STD WHT LTX FREE

## (undated) DEVICE — CONMED SCOPE SAVER BITE BLOCK, 20X27 MM: Brand: SCOPE SAVER

## (undated) DEVICE — SPLINT THMB W4XL30IN FBRGLS PD PRECUT LTWT DURABLE FAST SET

## (undated) DEVICE — PADDING UNDERCAST W4INXL4YD COT FBR LO LINTING WYTEX

## (undated) DEVICE — BIT DRL DIA2.5MM FOR ANK FRAC MGMT SYS

## (undated) DEVICE — ZIMMER® STERILE DISPOSABLE TOURNIQUET CUFF WITH PLC, DUAL PORT, SINGLE BLADDER, 30 IN. (76 CM)

## (undated) DEVICE — DRESSING,GAUZE,XEROFORM,CURAD,1"X8",ST: Brand: CURAD

## (undated) DEVICE — FORCEPS BX L240CM JAW DIA2.8MM L CAP W/ NDL MIC MESH TOOTH